# Patient Record
Sex: MALE | Race: WHITE | NOT HISPANIC OR LATINO | Employment: OTHER | URBAN - NONMETROPOLITAN AREA
[De-identification: names, ages, dates, MRNs, and addresses within clinical notes are randomized per-mention and may not be internally consistent; named-entity substitution may affect disease eponyms.]

---

## 2019-01-31 ENCOUNTER — OFFICE VISIT (OUTPATIENT)
Dept: NEUROSURGERY | Facility: CLINIC | Age: 54
End: 2019-01-31

## 2019-01-31 VITALS
HEIGHT: 67 IN | DIASTOLIC BLOOD PRESSURE: 76 MMHG | SYSTOLIC BLOOD PRESSURE: 118 MMHG | WEIGHT: 179.8 LBS | BODY MASS INDEX: 28.22 KG/M2

## 2019-01-31 DIAGNOSIS — G60.0 CHARCOT-MARIE-TOOTH DISEASE: ICD-10-CM

## 2019-01-31 DIAGNOSIS — M54.2 CERVICALGIA: ICD-10-CM

## 2019-01-31 DIAGNOSIS — Z78.9 NON-SMOKER: ICD-10-CM

## 2019-01-31 DIAGNOSIS — M54.12 CERVICAL RADICULOPATHY: ICD-10-CM

## 2019-01-31 DIAGNOSIS — M48.02 SPINAL STENOSIS IN CERVICAL REGION: Primary | ICD-10-CM

## 2019-01-31 PROCEDURE — 99203 OFFICE O/P NEW LOW 30 MIN: CPT | Performed by: NEUROLOGICAL SURGERY

## 2019-01-31 RX ORDER — ALBUTEROL SULFATE 90 UG/1
AEROSOL, METERED RESPIRATORY (INHALATION)
COMMUNITY
End: 2019-06-21

## 2019-01-31 RX ORDER — GEMFIBROZIL 600 MG/1
600 TABLET, FILM COATED ORAL DAILY
COMMUNITY
Start: 2018-12-03 | End: 2020-02-17 | Stop reason: SDUPTHER

## 2019-01-31 RX ORDER — TADALAFIL 20 MG/1
20 TABLET ORAL DAILY PRN
COMMUNITY
End: 2020-02-17 | Stop reason: SDUPTHER

## 2019-01-31 NOTE — PROGRESS NOTES
Patient: Iam Gibbs  : 1965    Primary Care Provider: Terri Frazier PA    Requesting Provider: Terri Frazier PA        History    Chief Complaint: Neck pain  Chief Complaint   Patient presents with   • Neck & arm pain     patient with pain in his neck that radiates into his shoulders and down his arms; patient brought imaging CD in for review today ([facility])       History of Present Illness: 54-year-old gentleman with a history of Charcot-Claire-Tooth disease, fell about a year ago secondary to his gait problems onto his left shoulder.  He then had several months of neck pain and bilateral shoulder pain.  He was treated by his primary care doctor with a steroid pack and was sent to an extensive course of physical therapy for about 6 months last year.  He also did chiropractic care and massage therapy.  They did use a traction device on his cervical spine.  He really struggled through most of the year with these chronic pain issues in his shoulders and neck until about a month ago.  He says for the last several weeks his pain is been remarkably better.  His shoulder pain is resolved and his neck pain is much more manageable.  He does not take any oral pain medication other than over-the-counter anti-inflammatories.  He was diagnosed with Charcot-Claire-Tooth and he was 14 years old.  He is been disabled for about 7 years from the lumbar and history.  He walks with a cane.  He also uses AFOs.    Review of Systems   Musculoskeletal: Positive for arthralgias, back pain, gait problem and neck pain.   All other systems reviewed and are negative.      Past Medical History:   Past Medical History:   Diagnosis Date   • Arthritis    • Back pain    • Charcot-Claire-Tooth disease    • COPD (chronic obstructive pulmonary disease) (CMS/[facility])    • GERD (gastroesophageal reflux disease)    • Hyperlipidemia    • Hypertension    • Irritable bowel        Past Surgical History: History reviewed. No  pertinent surgical history.    Family History: family history includes Cancer in his father and mother; Diabetes in his mother; Hyperlipidemia in his father; Hypertension in his father.    Social History:  reports that he quit smoking about 3 years ago. His smoking use included cigars. He quit smokeless tobacco use about 19 years ago. He reports that he drinks about 1.8 - 2.4 oz of alcohol per week. He reports that he does not use drugs.    Medications:    (Not in a hospital admission)    Allergies:  Patient has no known allergies.    Physical Exam:     Physical Exam   Constitutional: He is oriented to person, place, and time. He appears well-developed and well-nourished.   Cardiovascular: Normal rate, regular rhythm and normal heart sounds.   Pulmonary/Chest: Effort normal. No respiratory distress.   Abdominal: Soft. He exhibits no distension. There is no tenderness.   Neurological: He is oriented to person, place, and time. He has an abnormal Tandem Gait Test. He has a normal Finger-Nose-Finger Test.   Psychiatric: His speech is normal.       Neurologic Exam     Mental Status   Oriented to person, place, and time.   Attention: normal.   Speech: speech is normal   Level of consciousness: alert  Knowledge: good.     Cranial Nerves   Cranial nerves II through XII intact.     Motor Exam   Muscle bulk: Decreased muscle bulk in the hands and feet with wasting noted.  Overall muscle tone: normal  Right arm pronator drift: absent  Left arm pronator drift: absentOrthopedic deformities of the hands and feet    Strength in the upper extremities is grossly 5 out of 5 in all flexion extension movements    Strength in the lower extremities is 4 out of 5 in the quadriceps and hamstrings.  3 out of 5 in dorsiflexion plantarflexion of the feet.     Sensory Exam   Light touch normal.   Pinprick normal.     Gait, Coordination, and Reflexes     Gait  Gait: (Severe instability of gait with a large joint instability of the knees and  ankles)    Coordination   Finger to nose coordination: normal  Tandem walking coordination: abnormal    Tremor   Resting tremor: absent  Intention tremor: absent  Action tremor: absent    Reflexes   Reflexes 2+ except as noted.     Normal active  And passive range of motion of the shoulders    Independent Review of Radiographic Studies:   XR of cervical spine and left shoulder with mild degenerative changes    ASSESSMENT/PLAN:The nick has improved the last few weeks from his neck and shoulder injury. He has done conservative care and has back off his normal strenuous activities the last month and has noticed a big difference in his neck and shoulder pain. I do not feel the need to image his shoulders at this point. I did discuss MRI of the cervical spine as an option if his neck and shoulder pain returns. We will see him in follow up  In 6 weeks.  1. Spinal stenosis in cervical region    2. Cervical radiculopathy    3. Charcot-Claire-Tooth disease    4. Cervicalgia    5. BMI 28.0-28.9,adult    6. Non-smoker          Return in about 6 weeks (around 3/14/2019) for follow up w/ROSA.      Kike Pinedo MD

## 2019-01-31 NOTE — PATIENT INSTRUCTIONS
PATIENT TO CONTINUE TO FOLLOW UP WITH HIS PRIMARY CARE PROVIDER FOR YEARLY PHYSICAL EXAMS TO ENSURE COMPLETE HEALTH MAINTENANCE      BMI for Adults  Body mass index (BMI) is a number that is calculated from a person's weight and height. In most adults, the number is used to find how much of an adult's weight is made up of fat. BMI is not as accurate as a direct measure of body fat.  How is BMI calculated?  BMI is calculated by dividing weight in kilograms by height in meters squared. It can also be calculated by dividing weight in pounds by height in inches squared, then multiplying the resulting number by 703. Charts are available to help you find your BMI quickly and easily without doing this calculation.  How is BMI interpreted?  Health care professionals use BMI charts to identify whether an adult is underweight, at a normal weight, or overweight based on the following guidelines:  · Underweight: BMI less than 18.5.  · Normal weight: BMI between 18.5 and 24.9.  · Overweight: BMI between 25 and 29.9.  · Obese: BMI of 30 and above.    BMI is usually interpreted the same for males and females.  Weight includes both fat and muscle, so someone with a muscular build, such as an athlete, may have a BMI that is higher than 24.9. In cases like these, BMI may not accurately depict body fat. To determine if excess body fat is the cause of a BMI of 25 or higher, further assessments may need to be done by a health care provider.  Why is BMI a useful tool?  BMI is used to identify a possible weight problem that may be related to a medical problem or may increase the risk for medical problems. BMI can also be used to promote changes to reach a healthy weight.  This information is not intended to replace advice given to you by your health care provider. Make sure you discuss any questions you have with your health care provider.  Document Released: 08/29/2005 Document Revised: 04/27/2017 Document Reviewed: 05/15/2015  [employer]  Interactive Patient Education © 2018 [employer]

## 2019-03-14 ENCOUNTER — TELEPHONE (OUTPATIENT)
Dept: NEUROSURGERY | Facility: CLINIC | Age: 54
End: 2019-03-14

## 2019-03-14 NOTE — TELEPHONE ENCOUNTER
Raoul has an upcoming appointment for follow up on 03/19/19, he has called asking if we were going to order an MRI to be done prior to this appointment,  IF we do he will need something to relax him as he is very claustrophobic... I do not see an order for one.    Please advise and I will let patient know.     His number [phone]

## 2019-03-14 NOTE — TELEPHONE ENCOUNTER
Needs to be seen first to have documentation before we can order MRI. We can talk about something to help relax at that time

## 2019-03-18 NOTE — TELEPHONE ENCOUNTER
Per Dr Pinedo's note of 1/2019: symptoms were improved and there wasn't a need for MRI at that time.  If his symptoms have returned he may need to be examined - would leave it up to Ruben.    dusty albright CMA

## 2019-03-18 NOTE — TELEPHONE ENCOUNTER
Called Iam to let him know that documentation would be needed prior to ordering an MRI before his next appointment, he was VERY UNHAPPY about that, canceled his appointment and states HE WANTS THE MRI OF THE CERVICAL with medication to relax him for the MRI as he is very claustrophobic and THEN a follow up appointment, he sees no point in coming in until he has the MRI, just a waste of his time and money.  He states he must have totally misunderstood Dr. Pinedo at this last visit because he thought an MRI WAS TO BE SCHEDULED.     Please advise as to how to handle this.  The appointment has been canceled for follow up.

## 2019-03-19 NOTE — TELEPHONE ENCOUNTER
It is insurance protocol. I cannot order an MRI on person that had improved symptoms and now 2 months later is having worsening symptoms. I have to see and examine and have documentation for insurance reasons to justify MRI. I am happy to see but will not order till he is seen. If there is any problems when talking to the patient I will be happy to talk to him. thanks

## 2019-03-21 NOTE — TELEPHONE ENCOUNTER
I called Raoul back and explained to him once again that he would need to be seen to order another MRI, he then put his wife on the phone (I guess, her name was Jaky and she repeated to me again that they were told all that they needed to do was call and we would get an MRI prior to his next appointment) any way he decided to make another appointment to be seen and he was given an appointment for 04/11/19 with Ruben.

## 2019-04-11 ENCOUNTER — OFFICE VISIT (OUTPATIENT)
Dept: NEUROSURGERY | Facility: CLINIC | Age: 54
End: 2019-04-11

## 2019-04-11 ENCOUNTER — HOSPITAL ENCOUNTER (OUTPATIENT)
Dept: GENERAL RADIOLOGY | Facility: HOSPITAL | Age: 54
Discharge: HOME | End: 2019-04-11
Admitting: NURSE PRACTITIONER

## 2019-04-11 VITALS
BODY MASS INDEX: 28.28 KG/M2 | HEIGHT: 67 IN | WEIGHT: 180.2 LBS | SYSTOLIC BLOOD PRESSURE: 122 MMHG | DIASTOLIC BLOOD PRESSURE: 80 MMHG

## 2019-04-11 DIAGNOSIS — M25.511 CHRONIC RIGHT SHOULDER PAIN: ICD-10-CM

## 2019-04-11 DIAGNOSIS — M48.02 SPINAL STENOSIS IN CERVICAL REGION: Primary | ICD-10-CM

## 2019-04-11 DIAGNOSIS — M54.12 CERVICAL RADICULOPATHY: ICD-10-CM

## 2019-04-11 DIAGNOSIS — G89.29 CHRONIC RIGHT SHOULDER PAIN: ICD-10-CM

## 2019-04-11 DIAGNOSIS — Z78.9 NON-SMOKER: ICD-10-CM

## 2019-04-11 PROCEDURE — 99213 OFFICE O/P EST LOW 20 MIN: CPT | Performed by: NURSE PRACTITIONER

## 2019-04-11 PROCEDURE — 73030 X-RAY EXAM OF SHOULDER: CPT

## 2019-04-11 RX ORDER — ALPRAZOLAM 2 MG/1
2 TABLET ORAL ONCE
Qty: 1 TABLET | Refills: 0 | Status: SHIPPED | OUTPATIENT
Start: 2019-04-11 | End: 2019-04-11

## 2019-04-11 RX ORDER — FLUOXETINE HYDROCHLORIDE 20 MG/1
CAPSULE ORAL
COMMUNITY
End: 2019-06-21

## 2019-04-11 NOTE — PROGRESS NOTES
Chief complaint:   Chief Complaint   Patient presents with   • Neck Pain     Raoul is returning today and states that his neck and bilateral shoulder and arm pain.        Subjective     HPI: This is a 54-year-old male gentleman with a history of Charcot-Claire-Tooth disease.  He has been dealing with neck and shoulder pain.  He was given steroids and therapy and did well from this treatment.  He was doing well the last time that we saw him although he still has the pain he felt that was more manageable.  The patient has x-rays that do show he has some degeneration in his neck.  He says that when he was doing the therapy he did have some improvement with traction but he says since that was over he would have a return of his symptoms.  He continues to complain of pain in his neck and also pain going from his right shoulder down into his elbow bilaterally however the right is worse than the left.  Rates his pain on a scale 0-10 7.  He says it does interfere with his activities of daily living.    Review of Systems   Musculoskeletal: Positive for neck pain.   Neurological: Positive for weakness.        Past Medical History:   Diagnosis Date   • Arthritis    • Back pain    • Charcot-Claire-Tooth disease    • COPD (chronic obstructive pulmonary disease) (CMS/[facility])    • GERD (gastroesophageal reflux disease)    • Hyperlipidemia    • Hypertension    • Irritable bowel      No past surgical history on file.  Family History   Problem Relation Age of Onset   • Cancer Mother    • Diabetes Mother    • Hyperlipidemia Father    • Hypertension Father    • Cancer Father      Social History     Tobacco Use   • Smoking status: Former Smoker     Types: Cigars     Last attempt to quit: 10/2015     Years since quitting: 3.5   • Smokeless tobacco: Former User     Quit date: 2000   Substance Use Topics   • Alcohol use: Yes     Alcohol/week: 1.8 - 2.4 oz     Types: 3 - 4 Cans of beer per week   • Drug use: No       (Not in a hospital  "admission)  Allergies:  Patient has no known allergies.    Objective      Vital Signs  /80 (BP Location: Right arm, Patient Position: Sitting)   Ht 170.2 cm (67\")   Wt 81.7 kg (180 lb 3.2 oz)   BMI 28.22 kg/m²     Physical Exam   Constitutional: He is oriented to person, place, and time. He appears well-developed and well-nourished.   HENT:   Head: Normocephalic.   Eyes: EOM are normal. Pupils are equal, round, and reactive to light.   Neck: Normal range of motion.   Pulmonary/Chest: Effort normal.   Musculoskeletal: Normal range of motion.        Right shoulder: He exhibits pain.        Cervical back: He exhibits pain.   Neurological: He is alert and oriented to person, place, and time. He has normal reflexes. No cranial nerve deficit or sensory deficit. Gait abnormal. GCS eye subscore is 4. GCS verbal subscore is 5. GCS motor subscore is 6.   orthopedic deformities of the hands and feet    Strength in the upper extremities is grossly 5 out of 5 in all flexion extension movements    Strength in the lower extremities is 4 out of 5 in the quadriceps and hamstrings.  3 out of 5 in dorsiflexion plantarflexion of the feet.    Skin: Skin is warm.   Psychiatric: He has a normal mood and affect. His speech is normal and behavior is normal. Thought content normal.       Results Review: No new imaging          Assessment/Plan: At this point I am going to send the patient for x-rays of his right shoulder as well as an MRI of the cervical spine and right shoulder.  I will also send Xanax to his pharmacy to help him get through the MRI as he does have severe claustrophobia.  We will follow-up with him once this is completed and see if there is anything else that needs to be addressed.  BMI shows the patient is Overweight. BMI chart was given to the patient. Patient is a non-smoker    Iam was seen today for neck pain.    Diagnoses and all orders for this visit:    Spinal stenosis in cervical region  -     MRI " Cervical Spine Without Contrast; Future    Cervical radiculopathy  -     MRI Cervical Spine Without Contrast; Future    Chronic right shoulder pain  -     XR Shoulder 2+ View Right; Future  -     MRI shoulder right wo contrast; Future    Non-smoker    BMI 28.0-28.9,adult    Other orders  -     Cancel: MRI Cervical Spine Without Contrast; Future          I discussed the patients findings and my recommendations with patient    Ruben Sullivan, APRN  04/11/19  12:53 PM

## 2019-04-11 NOTE — PATIENT INSTRUCTIONS

## 2019-04-24 ENCOUNTER — HOSPITAL ENCOUNTER (OUTPATIENT)
Dept: MRI IMAGING | Facility: HOSPITAL | Age: 54
Discharge: HOME | End: 2019-04-24
Admitting: NURSE PRACTITIONER

## 2019-04-24 ENCOUNTER — HOSPITAL ENCOUNTER (OUTPATIENT)
Dept: MRI IMAGING | Facility: HOSPITAL | Age: 54
Discharge: HOME | End: 2019-04-24

## 2019-04-24 DIAGNOSIS — M54.12 CERVICAL RADICULOPATHY: ICD-10-CM

## 2019-04-24 DIAGNOSIS — M25.511 CHRONIC RIGHT SHOULDER PAIN: ICD-10-CM

## 2019-04-24 DIAGNOSIS — M48.02 SPINAL STENOSIS IN CERVICAL REGION: ICD-10-CM

## 2019-04-24 DIAGNOSIS — G89.29 CHRONIC RIGHT SHOULDER PAIN: ICD-10-CM

## 2019-04-24 PROCEDURE — 73221 MRI JOINT UPR EXTREM W/O DYE: CPT

## 2019-04-24 PROCEDURE — 72141 MRI NECK SPINE W/O DYE: CPT

## 2019-04-30 ENCOUNTER — TELEPHONE (OUTPATIENT)
Dept: NEUROSURGERY | Facility: CLINIC | Age: 54
End: 2019-04-30

## 2019-04-30 DIAGNOSIS — G89.29 CHRONIC RIGHT SHOULDER PAIN: Primary | ICD-10-CM

## 2019-04-30 DIAGNOSIS — M25.511 CHRONIC RIGHT SHOULDER PAIN: Primary | ICD-10-CM

## 2019-05-03 ENCOUNTER — TELEPHONE (OUTPATIENT)
Dept: NEUROSURGERY | Facility: CLINIC | Age: 54
End: 2019-05-03

## 2019-05-03 DIAGNOSIS — J44.9 CHRONIC OBSTRUCTIVE PULMONARY DISEASE, UNSPECIFIED COPD TYPE (HCC): ICD-10-CM

## 2019-05-03 NOTE — TELEPHONE ENCOUNTER
Called and left message on the patient's phone regarding the MRI of the cervical spine.  I did suggest that we we get an orthopedic surgeons opinion about his shoulder prior to doing any further intervention regarding his neck at this time as there is only mild degeneration and stenosis present.

## 2019-05-06 NOTE — TELEPHONE ENCOUNTER
Ruben did try calling patient and left him a message that he really needs to follow up on the shoulder issue before any more treatment for the neck, see chart.

## 2019-06-21 ENCOUNTER — APPOINTMENT (OUTPATIENT)
Dept: PREADMISSION TESTING | Facility: HOSPITAL | Age: 54
End: 2019-06-21

## 2019-06-21 VITALS
RESPIRATION RATE: 16 BRPM | BODY MASS INDEX: 27.72 KG/M2 | SYSTOLIC BLOOD PRESSURE: 133 MMHG | OXYGEN SATURATION: 96 % | DIASTOLIC BLOOD PRESSURE: 93 MMHG | HEIGHT: 67 IN | HEART RATE: 82 BPM | WEIGHT: 176.59 LBS

## 2019-06-21 LAB
ANION GAP SERPL CALCULATED.3IONS-SCNC: 12 MMOL/L (ref 4–13)
BUN BLD-MCNC: 20 MG/DL (ref 5–21)
BUN/CREAT SERPL: 21.7 (ref 7–25)
CALCIUM SPEC-SCNC: 9.4 MG/DL (ref 8.4–10.4)
CHLORIDE SERPL-SCNC: 98 MMOL/L (ref 98–110)
CO2 SERPL-SCNC: 30 MMOL/L (ref 24–31)
CREAT BLD-MCNC: 0.92 MG/DL (ref 0.5–1.4)
DEPRECATED RDW RBC AUTO: 39.1 FL (ref 40–54)
ERYTHROCYTE [DISTWIDTH] IN BLOOD BY AUTOMATED COUNT: 12.5 % (ref 12–15)
GFR SERPL CREATININE-BSD FRML MDRD: 86 ML/MIN/1.73
GLUCOSE BLD-MCNC: 96 MG/DL (ref 70–100)
HCT VFR BLD AUTO: 47.1 % (ref 40–52)
HGB BLD-MCNC: 16.7 G/DL (ref 14–18)
MCH RBC QN AUTO: 30.7 PG (ref 28–32)
MCHC RBC AUTO-ENTMCNC: 35.5 G/DL (ref 33–36)
MCV RBC AUTO: 86.6 FL (ref 82–95)
PLATELET # BLD AUTO: 249 10*3/MM3 (ref 130–400)
PMV BLD AUTO: 10 FL (ref 6–12)
POTASSIUM BLD-SCNC: 4 MMOL/L (ref 3.5–5.3)
RBC # BLD AUTO: 5.44 10*6/MM3 (ref 4.8–5.9)
SODIUM BLD-SCNC: 140 MMOL/L (ref 135–145)
WBC NRBC COR # BLD: 6.35 10*3/MM3 (ref 4.8–10.8)

## 2019-06-21 PROCEDURE — 36415 COLL VENOUS BLD VENIPUNCTURE: CPT

## 2019-06-21 PROCEDURE — 85027 COMPLETE CBC AUTOMATED: CPT | Performed by: ORTHOPAEDIC SURGERY

## 2019-06-21 PROCEDURE — 93010 ELECTROCARDIOGRAM REPORT: CPT | Performed by: INTERNAL MEDICINE

## 2019-06-21 PROCEDURE — 93005 ELECTROCARDIOGRAM TRACING: CPT

## 2019-06-21 PROCEDURE — 80048 BASIC METABOLIC PNL TOTAL CA: CPT | Performed by: ORTHOPAEDIC SURGERY

## 2019-06-21 RX ORDER — ALBUTEROL SULFATE 1.25 MG/3ML
1 SOLUTION RESPIRATORY (INHALATION) EVERY 6 HOURS PRN
COMMUNITY
End: 2020-01-13

## 2019-06-21 NOTE — DISCHARGE INSTRUCTIONS
DAY OF SURGERY INSTRUCTIONS        YOUR SURGEON: ***AILIN MARX     PROCEDURE: ***RIGHT SHOULDER ARTHROSCOPIC ROTATOR CUFF REPAIR WITH BICEPS TENODESIS     DATE OF SURGERY: ***6/28/2019    ARRIVAL TIME: AS DIRECTED BY OFFICE      ALL OTHER HOME MEDICATION CHECK WITH YOUR PHYSICIAN                MANAGING PAIN AFTER SURGERY    We know you are probably wondering what your pain will be like after surgery.  Following surgery it is unrealistic to expect you will not have pain.   Pain is how our bodies let us know that something is wrong or cautions us to be careful.  That said, our goal is to make your pain tolerable.    Methods we may use to treat your pain include (oral or IV medications, PCAs, epidurals, nerve blocks, etc.)   While some procedures require IV pain medications for a short time after surgery, transitioning to pain medications by mouth allows for better management of pain.   Your nurse will encourage you to take oral pain medications whenever possible.  IV medications work almost immediately, but only last a short while.  Taking medications by mouth allows for a more constant level of medication in your blood stream for a longer period of time.      Once your pain is out of control it is harder to get back under control.  It is important you are aware when your next dose of pain medication is due.  If you are admitted, your nurse may write the time of your next dose on the white board in your room to help you remember.      We are interested in your pain and encourage you to inform us about aggravating factors during your visit.   Many times a simple repositioning every few hours can make a big difference.    If your physician says it is okay, do not let your pain prevent you from getting out of bed. Be sure to call your nurse for assistance prior to getting up so you do not fall.      Before surgery, please decide your tolerable pain goal.  These faces help describe the pain ratings we use on a 0-10 scale.    Be prepared to tell us your goal and whether or not you take pain or anxiety medications at home.          BEFORE YOU COME TO THE HOSPITAL  (Pre-op instructions)  • Do not eat, drink, smoke or chew gum after midnight the night before surgery.  This also includes no mints.  • Morning of surgery take only the medicines you have been instructed with a sip of water unless otherwise instructed  by your physician.  • Do not shave, wear makeup or dark nail polish.  • Remove all jewelry including rings.  • Leave anything you consider valuable at home.  • Leave your suitcase in the car until after your surgery.  • Bring the following with you if applicable:  o Picture ID and insurance, Medicare or Medicaid cards  o Co-pay/deductible required by insurance (cash, check, credit card)  o Copy of advance directive, living will or power-of- documents if not brought to PAT  o CPAP or BIPAP mask and tubing  o Relaxation aids (MP3 player, book, magazine)  • On the day of surgery check in at registration located at the main entrance of the hospital.       Outpatient Surgery Guidelines, Adult  Outpatient procedures are those for which the person having the procedure is allowed to go home the same day as the procedure. Various procedures are done on an outpatient basis. You should follow some general guidelines if you will be having an outpatient procedure.  LET YOUR HEALTH CARE PROVIDER KNOW ABOUT:  · Any allergies you have.  · All medicines you are taking, including vitamins, herbs, eye drops, creams, and over-the-counter medicines.  · Previous problems you or members of your family have had with the use of anesthetics.  · Any blood disorders you have.  · Previous surgeries you have had.  · Medical conditions you have.  RISKS AND COMPLICATIONS  Your health care provider will discuss possible risks and complications with you before surgery. Common risks and complications include:    · Problems due to the use of  anesthetics.  · Blood loss and replacement (does not apply to minor surgical procedures).  · Temporary increase in pain due to surgery.  · Uncorrected pain or problems that the surgery was meant to correct.  · Infection.  · New damage.  BEFORE THE PROCEDURE  · Ask your health care provider about changing or stopping your regular medicines. You may need to stop taking certain medicines in the days or weeks before the procedure.  · Stop smoking at least 2 weeks before surgery. This lowers your risk for complications during and after surgery. Ask your health care provider for help with this if needed.  · Eat your usual meals and a light supper the day before surgery. Continue fluid intake. Do not drink alcohol.  · Do not eat or drink after midnight the night before your surgery.   · Arrange for someone to take you home and to stay with you for 24 hours after the procedure. Medicine given for your procedure may affect your ability to drive or to care for yourself.  · Call your health care provider's office if you develop an illness or problem that may prevent you from safely having your procedure.  AFTER THE PROCEDURE  After surgery, you will be taken to a recovery area, where your progress will be monitored. If there are no complications, you will be allowed to go home when you are awake, stable, and taking fluids well. You may have numbness around the surgical site. Healing will take some time. You will have tenderness at the surgical site and may have some swelling and bruising. You may also have some nausea.  HOME CARE INSTRUCTIONS  · Do not drive for 24 hours, or as directed by your health care provider. Do not drive while taking prescription pain medicines.  · Do not drink alcohol for 24 hours.  · Do not make important decisions or sign legal documents for 24 hours.  · You may resume a normal diet and activities as directed.  · Do not lift anything heavier than 10 pounds (4.5 kg) or play contact sports until your  health care provider says it is okay.  · Change your bandages (dressings) as directed.  · Only take over-the-counter or prescription medicines as directed by your health care provider.  · Follow up with your health care provider as directed.  SEEK MEDICAL CARE IF:  · You have increased bleeding (more than a small spot) from the surgical site.  · You have redness, swelling, or increasing pain in the wound.  · You see pus coming from the wound.  · You have a fever.  · You notice a bad smell coming from the wound or dressing.  · You feel lightheaded or faint.  · You develop a rash.  · You have trouble breathing.  · You develop allergies.  MAKE SURE YOU:  · Understand these instructions.  · Will watch your condition.  · Will get help right away if you are not doing well or get worse.     This information is not intended to replace advice given to you by your health care provider. Make sure you discuss any questions you have with your health care provider.     Document Released: 09/12/2002 Document Revised: 05/03/2016 Document Reviewed: 05/22/2014  [employer] Interactive Patient Education ©2016 [employer]       Fall Prevention in Hospitals, Adult  As a hospital patient, your condition and the treatments you receive can increase your risk for falls. Some additional risk factors for falls in a hospital include:  · Being in an unfamiliar environment.  · Being on bed rest.  · Your surgery.  · Taking certain medicines.  · Your tubing requirements, such as intravenous (IV) therapy or catheters.  It is important that you learn how to decrease fall risks while at the hospital. Below are important tips that can help prevent falls.  SAFETY TIPS FOR PREVENTING FALLS  Talk about your risk of falling.  · Ask your health care provider why you are at risk for falling. Is it your medicine, illness, tubing placement, or something else?  · Make a plan with your health care provider to keep you safe from falls.  · Ask your health care  provider or pharmacist about side effects of your medicines. Some medicines can make you dizzy or affect your coordination.  Ask for help.  · Ask for help before getting out of bed. You may need to press your call button.  · Ask for assistance in getting safely to the toilet.  · Ask for a walker or cane to be put at your bedside. Ask that most of the side rails on your bed be placed up before your health care provider leaves the room.  · Ask family or friends to sit with you.  · Ask for things that are out of your reach, such as your glasses, hearing aids, telephone, bedside table, or call button.  Follow these tips to avoid falling:  · Stay lying or seated, rather than standing, while waiting for help.  · Wear rubber-soled slippers or shoes whenever you walk in the hospital.  · Avoid quick, sudden movements.  ¨ Change positions slowly.  ¨ Sit on the side of your bed before standing.  ¨ Stand up slowly and wait before you start to walk.  · Let your health care provider know if there is a spill on the floor.  · Pay careful attention to the medical equipment, electrical cords, and tubes around you.  · When you need help, use your call button by your bed or in the bathroom. Wait for one of your health care providers to help you.  · If you feel dizzy or unsure of your footing, return to bed and wait for assistance.  · Avoid being distracted by the TV, telephone, or another person in your room.  · Do not lean or support yourself on rolling objects, such as IV poles or bedside tables.     This information is not intended to replace advice given to you by your health care provider. Make sure you discuss any questions you have with your health care provider.     Document Released: 12/15/2001 Document Revised: 01/08/2016 Document Reviewed: 08/25/2013  [employer] Interactive Patient Education ©2016 [employer]       Surgical Site Infections FAQs  What is a Surgical Site Infection (SSI)?  A surgical site infection is an  infection that occurs after surgery in the part of the body where the surgery took place. Most patients who have surgery do not develop an infection. However, infections develop in about 1 to 3 out of every 100 patients who have surgery.  Some of the common symptoms of a surgical site infection are:  · Redness and pain around the area where you had surgery  · Drainage of cloudy fluid from your surgical wound  · Fever  Can SSIs be treated?  Yes. Most surgical site infections can be treated with antibiotics. The antibiotic given to you depends on the bacteria (germs) causing the infection. Sometimes patients with SSIs also need another surgery to treat the infection.  What are some of the things that hospitals are doing to prevent SSIs?  To prevent SSIs, doctors, nurses, and other healthcare providers:  · Clean their hands and arms up to their elbows with an antiseptic agent just before the surgery.  · Clean their hands with soap and water or an alcohol-based hand rub before and after caring for each patient.  · May remove some of your hair immediately before your surgery using electric clippers if the hair is in the same area where the procedure will occur. They should not shave you with a razor.  · Wear special hair covers, masks, gowns, and gloves during surgery to keep the surgery area clean.  · Give you antibiotics before your surgery starts. In most cases, you should get antibiotics within 60 minutes before the surgery starts and the antibiotics should be stopped within 24 hours after surgery.  · Clean the skin at the site of your surgery with a special soap that kills germs.  What can I do to help prevent SSIs?  Before your surgery:  · Tell your doctor about other medical problems you may have. Health problems such as allergies, diabetes, and obesity could affect your surgery and your treatment.  · Quit smoking. Patients who smoke get more infections. Talk to your doctor about how you can quit before your  surgery.  · Do not shave near where you will have surgery. Shaving with a razor can irritate your skin and make it easier to develop an infection.  At the time of your surgery:  · Speak up if someone tries to shave you with a razor before surgery. Ask why you need to be shaved and talk with your surgeon if you have any concerns.  · Ask if you will get antibiotics before surgery.  After your surgery:  · Make sure that your healthcare providers clean their hands before examining you, either with soap and water or an alcohol-based hand rub.  · If you do not see your providers clean their hands, please ask them to do so.  · Family and friends who visit you should not touch the surgical wound or dressings.  · Family and friends should clean their hands with soap and water or an alcohol-based hand rub before and after visiting you. If you do not see them clean their hands, ask them to clean their hands.  What do I need to do when I go home from the hospital?  · Before you go home, your doctor or nurse should explain everything you need to know about taking care of your wound. Make sure you understand how to care for your wound before you leave the hospital.  · Always clean your hands before and after caring for your wound.  · Before you go home, make sure you know who to contact if you have questions or problems after you get home.  · If you have any symptoms of an infection, such as redness and pain at the surgery site, drainage, or fever, call your doctor immediately.  If you have additional questions, please ask your doctor or nurse.  Developed and co-sponsored by The Society for Healthcare Epidemiology of [place] (SHEA); Infectious Diseases Society of [place] (IDSA); American Hospital Association; Association for Professionals in Infection Control and Epidemiology (APIC); Centers for Disease Control and Prevention (CDC); and The Joint Commission.     This information is not intended to replace advice given to you by  your health care provider. Make sure you discuss any questions you have with your health care provider.     Document Released: 12/23/2014 Document Revised: 01/08/2016 Document Reviewed: 03/02/2016  [employer] Interactive Patient Education ©2016 [employer]       [facility]  CHG 4% Patient Instruction Sheet    Preparing the Skin Before Surgery  Preparing or “prepping” skin before surgery can reduce the risk of infection at the surgical site. To make the process easier,[facility] has chosen 4% Chlorhexidine Gluconate (CHG) antiseptic solution.   The steps below outline the prepping process and should be carefully followed.                                                                                                                                                      Prep the skin at the following time(s):                                                      We recommend you shower the night before surgery, and again the morning of surgery with the 4% CHG antiseptic solution using half of the bottle and a cloth each time.  Dress in clean clothes/sleepwear after showering.  See instructions below for application.          Do not apply any lotions or moisturizers.       Do not shave the area to be prepped for at least 2 days prior to surgery.    Clipping the hair may be done immediately prior to your surgery at the hospital    if needed.    Directions:  Thoroughly rinse your body with water.  Apply 4% CHG to a cloth and wash skin gently, paying special attention to the operative site.  Rinse again thoroughly.  Once you have begun using this product do not apply anything else to your skin. If itching or redness persists, rinse affected areas and discontinue use.    When using this product:  • Keep out of eyes, ears, and mouth.  • If solution should contact these areas, rinse out promptly and thoroughly with water.  • For external use only.  • Do not use in genital area, on your face or  head.      PATIENT/FAMILY/RESPONSIBLE PARTY VERBALIZES UNDERSTANDING OF ABOVE EDUCATION.  COPY OF PAIN SCALE GIVEN AND REVIEWED WITH VERBALIZED UNDERSTANDING.

## 2019-06-28 ENCOUNTER — ANESTHESIA (OUTPATIENT)
Dept: PERIOP | Facility: HOSPITAL | Age: 54
End: 2019-06-28

## 2019-06-28 ENCOUNTER — ANESTHESIA EVENT (OUTPATIENT)
Dept: PERIOP | Facility: HOSPITAL | Age: 54
End: 2019-06-28

## 2019-06-28 ENCOUNTER — HOSPITAL ENCOUNTER (OUTPATIENT)
Facility: HOSPITAL | Age: 54
Setting detail: HOSPITAL OUTPATIENT SURGERY
Discharge: HOME | End: 2019-06-28
Attending: ORTHOPAEDIC SURGERY | Admitting: ORTHOPAEDIC SURGERY

## 2019-06-28 VITALS
DIASTOLIC BLOOD PRESSURE: 68 MMHG | SYSTOLIC BLOOD PRESSURE: 103 MMHG | TEMPERATURE: 97 F | HEART RATE: 85 BPM | OXYGEN SATURATION: 97 % | RESPIRATION RATE: 16 BRPM

## 2019-06-28 PROCEDURE — C1713 ANCHOR/SCREW BN/BN,TIS/BN: HCPCS | Performed by: ORTHOPAEDIC SURGERY

## 2019-06-28 PROCEDURE — 25010000002 DEXAMETHASONE PER 1 MG: Performed by: ANESTHESIOLOGY

## 2019-06-28 PROCEDURE — 25010000002 EPINEPHRINE PER 0.1 MG: Performed by: ORTHOPAEDIC SURGERY

## 2019-06-28 PROCEDURE — 25010000002 PHENYLEPHRINE PER 1 ML: Performed by: NURSE ANESTHETIST, CERTIFIED REGISTERED

## 2019-06-28 PROCEDURE — 25010000002 ONDANSETRON PER 1 MG: Performed by: NURSE ANESTHETIST, CERTIFIED REGISTERED

## 2019-06-28 PROCEDURE — 25010000002 PROPOFOL 10 MG/ML EMULSION: Performed by: NURSE ANESTHETIST, CERTIFIED REGISTERED

## 2019-06-28 PROCEDURE — 25010000002 ROPIVACAINE PER 1 MG: Performed by: ANESTHESIOLOGY

## 2019-06-28 PROCEDURE — 25010000002 FENTANYL CITRATE (PF) 100 MCG/2ML SOLUTION: Performed by: ANESTHESIOLOGY

## 2019-06-28 PROCEDURE — 25010000002 MIDAZOLAM PER 1 MG: Performed by: ANESTHESIOLOGY

## 2019-06-28 DEVICE — SUT/ANCH JUGGERKNOT SFT 2.9MM: Type: IMPLANTABLE DEVICE | Status: FUNCTIONAL

## 2019-06-28 RX ORDER — SODIUM CHLORIDE 0.9 % (FLUSH) 0.9 %
1-10 SYRINGE (ML) INJECTION AS NEEDED
Status: DISCONTINUED | OUTPATIENT
Start: 2019-06-28 | End: 2019-06-28 | Stop reason: HOSPADM

## 2019-06-28 RX ORDER — SODIUM CHLORIDE, SODIUM LACTATE, POTASSIUM CHLORIDE, CALCIUM CHLORIDE 600; 310; 30; 20 MG/100ML; MG/100ML; MG/100ML; MG/100ML
100 INJECTION, SOLUTION INTRAVENOUS CONTINUOUS PRN
Status: DISCONTINUED | OUTPATIENT
Start: 2019-06-28 | End: 2019-06-28 | Stop reason: HOSPADM

## 2019-06-28 RX ORDER — OXYCODONE AND ACETAMINOPHEN 10; 325 MG/1; MG/1
1 TABLET ORAL EVERY 6 HOURS PRN
Qty: 40 TABLET | Refills: 0 | Status: SHIPPED | OUTPATIENT
Start: 2019-06-28 | End: 2020-01-13

## 2019-06-28 RX ORDER — SODIUM CHLORIDE 0.9 % (FLUSH) 0.9 %
3 SYRINGE (ML) INJECTION EVERY 12 HOURS SCHEDULED
Status: DISCONTINUED | OUTPATIENT
Start: 2019-06-28 | End: 2019-06-28 | Stop reason: HOSPADM

## 2019-06-28 RX ORDER — ONDANSETRON 4 MG/1
4 TABLET, FILM COATED ORAL EVERY 8 HOURS PRN
Qty: 20 TABLET | Refills: 0 | Status: SHIPPED | OUTPATIENT
Start: 2019-06-28 | End: 2020-01-13

## 2019-06-28 RX ORDER — LABETALOL HYDROCHLORIDE 5 MG/ML
5 INJECTION, SOLUTION INTRAVENOUS
Status: DISCONTINUED | OUTPATIENT
Start: 2019-06-28 | End: 2019-06-28 | Stop reason: HOSPADM

## 2019-06-28 RX ORDER — NALOXONE HCL 0.4 MG/ML
0.4 VIAL (ML) INJECTION AS NEEDED
Status: DISCONTINUED | OUTPATIENT
Start: 2019-06-28 | End: 2019-06-28 | Stop reason: HOSPADM

## 2019-06-28 RX ORDER — VASOPRESSIN 20 U/ML
INJECTION PARENTERAL AS NEEDED
Status: DISCONTINUED | OUTPATIENT
Start: 2019-06-28 | End: 2019-06-28 | Stop reason: SURG

## 2019-06-28 RX ORDER — IPRATROPIUM BROMIDE AND ALBUTEROL SULFATE 2.5; .5 MG/3ML; MG/3ML
3 SOLUTION RESPIRATORY (INHALATION) ONCE AS NEEDED
Status: DISCONTINUED | OUTPATIENT
Start: 2019-06-28 | End: 2019-06-28 | Stop reason: HOSPADM

## 2019-06-28 RX ORDER — IBUPROFEN 600 MG/1
600 TABLET ORAL ONCE AS NEEDED
Status: DISCONTINUED | OUTPATIENT
Start: 2019-06-28 | End: 2019-06-28 | Stop reason: HOSPADM

## 2019-06-28 RX ORDER — MIDAZOLAM HYDROCHLORIDE 1 MG/ML
2 INJECTION INTRAMUSCULAR; INTRAVENOUS
Status: DISCONTINUED | OUTPATIENT
Start: 2019-06-28 | End: 2019-06-28 | Stop reason: HOSPADM

## 2019-06-28 RX ORDER — MIDAZOLAM HYDROCHLORIDE 1 MG/ML
1 INJECTION INTRAMUSCULAR; INTRAVENOUS
Status: DISCONTINUED | OUTPATIENT
Start: 2019-06-28 | End: 2019-06-28 | Stop reason: HOSPADM

## 2019-06-28 RX ORDER — ONDANSETRON 4 MG/1
4 TABLET, FILM COATED ORAL ONCE AS NEEDED
Status: DISCONTINUED | OUTPATIENT
Start: 2019-06-28 | End: 2019-06-28 | Stop reason: HOSPADM

## 2019-06-28 RX ORDER — OXYCODONE AND ACETAMINOPHEN 7.5; 325 MG/1; MG/1
1 TABLET ORAL ONCE AS NEEDED
Status: DISCONTINUED | OUTPATIENT
Start: 2019-06-28 | End: 2019-06-28 | Stop reason: HOSPADM

## 2019-06-28 RX ORDER — MAGNESIUM HYDROXIDE 1200 MG/15ML
LIQUID ORAL AS NEEDED
Status: DISCONTINUED | OUTPATIENT
Start: 2019-06-28 | End: 2019-06-28 | Stop reason: HOSPADM

## 2019-06-28 RX ORDER — OXYCODONE HYDROCHLORIDE AND ACETAMINOPHEN 5; 325 MG/1; MG/1
1 TABLET ORAL ONCE AS NEEDED
Status: DISCONTINUED | OUTPATIENT
Start: 2019-06-28 | End: 2019-06-28 | Stop reason: HOSPADM

## 2019-06-28 RX ORDER — SODIUM CHLORIDE 0.9 % (FLUSH) 0.9 %
3-10 SYRINGE (ML) INJECTION AS NEEDED
Status: DISCONTINUED | OUTPATIENT
Start: 2019-06-28 | End: 2019-06-28 | Stop reason: HOSPADM

## 2019-06-28 RX ORDER — OXYCODONE AND ACETAMINOPHEN 7.5; 325 MG/1; MG/1
1 TABLET ORAL EVERY 4 HOURS PRN
Status: DISCONTINUED | OUTPATIENT
Start: 2019-06-28 | End: 2019-06-28 | Stop reason: HOSPADM

## 2019-06-28 RX ORDER — SODIUM CHLORIDE, SODIUM LACTATE, POTASSIUM CHLORIDE, CALCIUM CHLORIDE 600; 310; 30; 20 MG/100ML; MG/100ML; MG/100ML; MG/100ML
100 INJECTION, SOLUTION INTRAVENOUS CONTINUOUS
Status: DISCONTINUED | OUTPATIENT
Start: 2019-06-28 | End: 2019-06-28 | Stop reason: HOSPADM

## 2019-06-28 RX ORDER — ROPIVACAINE HYDROCHLORIDE 5 MG/ML
INJECTION, SOLUTION EPIDURAL; INFILTRATION; PERINEURAL
Status: COMPLETED | OUTPATIENT
Start: 2019-06-28 | End: 2019-06-28

## 2019-06-28 RX ORDER — DEXAMETHASONE SODIUM PHOSPHATE 4 MG/ML
4 INJECTION, SOLUTION INTRA-ARTICULAR; INTRALESIONAL; INTRAMUSCULAR; INTRAVENOUS; SOFT TISSUE ONCE AS NEEDED
Status: COMPLETED | OUTPATIENT
Start: 2019-06-28 | End: 2019-06-28

## 2019-06-28 RX ORDER — FENTANYL CITRATE 50 UG/ML
25 INJECTION, SOLUTION INTRAMUSCULAR; INTRAVENOUS
Status: DISCONTINUED | OUTPATIENT
Start: 2019-06-28 | End: 2019-06-28 | Stop reason: HOSPADM

## 2019-06-28 RX ORDER — BUPIVACAINE HCL/0.9 % NACL/PF 0.1 %
2 PLASTIC BAG, INJECTION (ML) EPIDURAL ONCE
Status: COMPLETED | OUTPATIENT
Start: 2019-06-28 | End: 2019-06-28

## 2019-06-28 RX ORDER — EPINEPHRINE 1 MG/ML
INJECTION, SOLUTION, CONCENTRATE INTRAVENOUS AS NEEDED
Status: DISCONTINUED | OUTPATIENT
Start: 2019-06-28 | End: 2019-06-28 | Stop reason: HOSPADM

## 2019-06-28 RX ORDER — ACETAMINOPHEN 500 MG
1000 TABLET ORAL ONCE
Status: COMPLETED | OUTPATIENT
Start: 2019-06-28 | End: 2019-06-28

## 2019-06-28 RX ORDER — ONDANSETRON 2 MG/ML
4 INJECTION INTRAMUSCULAR; INTRAVENOUS ONCE AS NEEDED
Status: DISCONTINUED | OUTPATIENT
Start: 2019-06-28 | End: 2019-06-28 | Stop reason: HOSPADM

## 2019-06-28 RX ORDER — ONDANSETRON 2 MG/ML
INJECTION INTRAMUSCULAR; INTRAVENOUS AS NEEDED
Status: DISCONTINUED | OUTPATIENT
Start: 2019-06-28 | End: 2019-06-28 | Stop reason: SURG

## 2019-06-28 RX ORDER — FENTANYL CITRATE 50 UG/ML
25 INJECTION, SOLUTION INTRAMUSCULAR; INTRAVENOUS AS NEEDED
Status: DISCONTINUED | OUTPATIENT
Start: 2019-06-28 | End: 2019-06-28 | Stop reason: HOSPADM

## 2019-06-28 RX ORDER — LIDOCAINE HYDROCHLORIDE 20 MG/ML
INJECTION, SOLUTION INFILTRATION; PERINEURAL AS NEEDED
Status: DISCONTINUED | OUTPATIENT
Start: 2019-06-28 | End: 2019-06-28 | Stop reason: SURG

## 2019-06-28 RX ORDER — PROPOFOL 10 MG/ML
VIAL (ML) INTRAVENOUS AS NEEDED
Status: DISCONTINUED | OUTPATIENT
Start: 2019-06-28 | End: 2019-06-28 | Stop reason: SURG

## 2019-06-28 RX ORDER — LIDOCAINE HYDROCHLORIDE 40 MG/ML
SOLUTION TOPICAL AS NEEDED
Status: DISCONTINUED | OUTPATIENT
Start: 2019-06-28 | End: 2019-06-28 | Stop reason: SURG

## 2019-06-28 RX ORDER — ROCURONIUM BROMIDE 10 MG/ML
INJECTION, SOLUTION INTRAVENOUS AS NEEDED
Status: DISCONTINUED | OUTPATIENT
Start: 2019-06-28 | End: 2019-06-28 | Stop reason: SURG

## 2019-06-28 RX ADMIN — FENTANYL CITRATE 100 MCG: 50 INJECTION INTRAMUSCULAR; INTRAVENOUS at 09:14

## 2019-06-28 RX ADMIN — ROPIVACAINE HYDROCHLORIDE 15 ML: 5 INJECTION, SOLUTION EPIDURAL; INFILTRATION; PERINEURAL at 09:26

## 2019-06-28 RX ADMIN — PHENYLEPHRINE HYDROCHLORIDE 120 MCG: 10 INJECTION INTRAVENOUS at 10:16

## 2019-06-28 RX ADMIN — Medication 2 G: at 09:38

## 2019-06-28 RX ADMIN — VASOPRESSIN 2 UNITS: 20 INJECTION INTRAVENOUS at 10:09

## 2019-06-28 RX ADMIN — MIDAZOLAM HYDROCHLORIDE 2 MG: 1 INJECTION, SOLUTION INTRAMUSCULAR; INTRAVENOUS at 09:15

## 2019-06-28 RX ADMIN — ONDANSETRON HYDROCHLORIDE 4 MG: 2 SOLUTION INTRAMUSCULAR; INTRAVENOUS at 10:59

## 2019-06-28 RX ADMIN — SODIUM CHLORIDE, POTASSIUM CHLORIDE, SODIUM LACTATE AND CALCIUM CHLORIDE: 600; 310; 30; 20 INJECTION, SOLUTION INTRAVENOUS at 11:00

## 2019-06-28 RX ADMIN — SODIUM CHLORIDE, POTASSIUM CHLORIDE, SODIUM LACTATE AND CALCIUM CHLORIDE 100 ML/HR: 600; 310; 30; 20 INJECTION, SOLUTION INTRAVENOUS at 07:38

## 2019-06-28 RX ADMIN — PHENYLEPHRINE HYDROCHLORIDE 160 MCG: 10 INJECTION INTRAVENOUS at 10:33

## 2019-06-28 RX ADMIN — ROCURONIUM BROMIDE 30 MG: 10 INJECTION INTRAVENOUS at 09:35

## 2019-06-28 RX ADMIN — SUGAMMADEX 200 MG: 100 INJECTION, SOLUTION INTRAVENOUS at 11:08

## 2019-06-28 RX ADMIN — LIDOCAINE HYDROCHLORIDE 100 MG: 20 INJECTION, SOLUTION INFILTRATION; PERINEURAL at 09:35

## 2019-06-28 RX ADMIN — VASOPRESSIN 0.5 UNITS: 20 INJECTION INTRAVENOUS at 09:38

## 2019-06-28 RX ADMIN — VASOPRESSIN 2 UNITS: 20 INJECTION INTRAVENOUS at 10:04

## 2019-06-28 RX ADMIN — LIDOCAINE HYDROCHLORIDE 1 EACH: 40 SOLUTION TOPICAL at 09:35

## 2019-06-28 RX ADMIN — DEXAMETHASONE SODIUM PHOSPHATE 4 MG: 4 INJECTION, SOLUTION INTRAMUSCULAR; INTRAVENOUS at 09:14

## 2019-06-28 RX ADMIN — VASOPRESSIN 1 UNITS: 20 INJECTION INTRAVENOUS at 09:55

## 2019-06-28 RX ADMIN — PROPOFOL 150 MG: 10 INJECTION, EMULSION INTRAVENOUS at 09:35

## 2019-06-28 RX ADMIN — VASOPRESSIN 0.5 UNITS: 20 INJECTION INTRAVENOUS at 09:40

## 2019-06-28 RX ADMIN — ACETAMINOPHEN 1000 MG: 500 TABLET, FILM COATED ORAL at 09:13

## 2019-06-28 RX ADMIN — PHENYLEPHRINE HYDROCHLORIDE 120 MCG: 10 INJECTION INTRAVENOUS at 10:13

## 2019-06-28 NOTE — ANESTHESIA PROCEDURE NOTES
Peripheral Block    Pre-sedation assessment completed: 6/28/2019 9:22 AM    Patient reassessed immediately prior to procedure    Patient location during procedure: holding area  Start time: 6/28/2019 9:23 AM  Stop time: 6/28/2019 9:26 AM  Reason for block: procedure for pain, at surgeon's request, post-op pain management and Request by Dr. Agudelo  Performed by  Anesthesiologist: Agnieszka Leal MD  Preanesthetic Checklist  Completed: patient identified, site marked, surgical consent, pre-op evaluation, timeout performed, IV checked, risks and benefits discussed and monitors and equipment checked  Prep:  Pt Position: supine  Sterile barriers:gloves  Prep: ChloraPrep  Patient monitoring: blood pressure monitoring, continuous pulse oximetry and EKG  Procedure  Sedation:yes    Guidance:ultrasound guided and Brachial plexus identified and local anesthetic seen surrounding nerves  ULTRASOUND INTERPRETATION. Using ultrasound guidance a 22 G gauge needle was placed in close proximity to the nerve, at which point, under ultrasound guidance anesthetic was injected in the area of the nerve and spread of the anesthesia was seen on ultrasound in close proximity thereto.  There were no abnormalities seen on ultrasound; a digital image was taken; and the patient tolerated the procedure with no complications. Images:still images obtained, printed/placed on chart (picture printed and placed in patients chart)    Laterality:right  Block Type:interscalene  Injection Technique:single-shot  Needle Type:echogenic  Needle Gauge:22 G  Resistance on Injection: none    Medications Used: ropivacaine (NAROPIN) injection 0.5 %, 15 mL  Med admintered at 6/28/2019 9:26 AM      Post Assessment  Injection Assessment: negative aspiration for heme, no paresthesia on injection and incremental injection  Patient Tolerance:comfortable throughout block  Complications:no

## 2019-06-28 NOTE — ANESTHESIA PROCEDURE NOTES
Airway  Urgency: elective    Airway not difficult    General Information and Staff    Patient location during procedure: OR  CRNA: Luiz Jeong CRNA    Indications and Patient Condition  Indications for airway management: airway protection    Preoxygenated: yes  MILS maintained throughout  Mask difficulty assessment: 1 - vent by mask    Final Airway Details  Final airway type: endotracheal airway      Successful airway: ETT  Cuffed: yes   Successful intubation technique: direct laryngoscopy  Endotracheal tube insertion site: oral  Blade: Moreira  Blade size: 2  ETT size (mm): 4.0  Cormack-Lehane Classification: grade I - full view of glottis  Placement verified by: chest auscultation and capnometry   Cuff volume (mL): 5  Measured from: lips  ETT to lips (cm): 22  Number of attempts at approach: 1

## 2019-06-28 NOTE — ANESTHESIA PREPROCEDURE EVALUATION
Anesthesia Evaluation     Patient summary reviewed   NPO Solid Status: > 8 hours             Airway   Mallampati: I  TM distance: >3 FB  Neck ROM: full  No difficulty expected  Dental - normal exam     Pulmonary    (+) a smoker Former, COPD, sleep apnea,   Cardiovascular   Exercise tolerance: poor (<4 METS) (Walks with cane. Denies chest pain/shortness of breath)    ECG reviewed    (+) hypertension, hyperlipidemia,       Neuro/Psych  (+) numbness, psychiatric history Anxiety,       ROS Comment: Charcot-Claire-Tooth  GI/Hepatic/Renal/Endo    (+)  GERD,      Musculoskeletal     (+) neck pain,   Abdominal    Substance History      OB/GYN          Other   (+) arthritis            Resser Flexible laryngoscopy 11/2016:  Findings: There is bilateral paresis of the vocal cords. The airway appears to be adequate but the glottic opening is much less than expected. There is decreased lateral excursion of the vocal cords. There is no mass or lesion noticed. The airway appears to be 4-5 mm.              Anesthesia Plan    ASA 3     general with block   (Patient with charcot-claire-tooth. Prior 02 use, but now on room air without respiratory compromise. Bilateral vocal cord paresis. Discussed R/B/A of nerve block with patient. Decision made to proceed. Discussed case with surgeon and possible requirement of admission post operatively for observation.     Avoid suucinylcholine. Plan for rocuronium followed by sugammadex.     )  intravenous induction   Anesthetic plan, all risks, benefits, and alternatives have been provided, discussed and informed consent has been obtained with: patient.

## 2019-06-28 NOTE — ANESTHESIA POSTPROCEDURE EVALUATION
Patient: Iam Gibbs    Procedure Summary     Date:  06/28/19 Room / Location:   [facility] OR  /  [facility]    Anesthesia Start:  0933 Anesthesia Stop:  1120    Procedures:       RIGHT SHOULDER ARTHROSCOPIC ROTATOR CUFF REPAIR, SUBACROMIAL DECOMPRESSION (Right Shoulder)      BICEPS TENODESIS - RIGHT (Right Arm Upper) Diagnosis:  (RIGHT SHOULDER PAIN)    Surgeon:  Yeison Agudelo MD Provider:  Camryn Cartagena CRNA    Anesthesia Type:  general with block ASA Status:  3          Anesthesia Type: general with block  Last vitals  BP   106/71 (06/28/19 1222)   Temp   97 °F (36.1 °C) (06/28/19 1205)   Pulse   93 (06/28/19 1222)   Resp   16 (06/28/19 1222)     SpO2   95 % (06/28/19 1222)     Post Anesthesia Care and Evaluation    Patient location during evaluation: PACU  Patient participation: complete - patient participated  Level of consciousness: awake and alert  Pain management: adequate  Airway patency: patent  Anesthetic complications: No anesthetic complications  PONV Status: none  Cardiovascular status: acceptable and hemodynamically stable  Respiratory status: acceptable  Hydration status: acceptable    Comments: Blood pressure 106/71, pulse 93, temperature 97 °F (36.1 °C), temperature source Temporal, resp. rate 16, SpO2 95 %.    Patient discharged from PACU based upon Minnie score. Please see RN notes for further details

## 2020-01-13 ENCOUNTER — OFFICE VISIT (OUTPATIENT)
Dept: FAMILY MEDICINE CLINIC | Facility: CLINIC | Age: 55
End: 2020-01-13

## 2020-01-13 ENCOUNTER — TELEPHONE (OUTPATIENT)
Dept: FAMILY MEDICINE CLINIC | Facility: CLINIC | Age: 55
End: 2020-01-13

## 2020-01-13 VITALS
TEMPERATURE: 98.8 F | WEIGHT: 178 LBS | SYSTOLIC BLOOD PRESSURE: 118 MMHG | HEART RATE: 80 BPM | HEIGHT: 67 IN | OXYGEN SATURATION: 98 % | BODY MASS INDEX: 27.94 KG/M2 | RESPIRATION RATE: 16 BRPM | DIASTOLIC BLOOD PRESSURE: 88 MMHG

## 2020-01-13 DIAGNOSIS — J44.9 CHRONIC OBSTRUCTIVE PULMONARY DISEASE, UNSPECIFIED COPD TYPE (HCC): ICD-10-CM

## 2020-01-13 DIAGNOSIS — M10.9 GOUT OF RIGHT FOOT, UNSPECIFIED CAUSE, UNSPECIFIED CHRONICITY: ICD-10-CM

## 2020-01-13 DIAGNOSIS — Z87.09 HISTORY OF CHRONIC RESPIRATORY FAILURE: ICD-10-CM

## 2020-01-13 DIAGNOSIS — K21.9 GASTROESOPHAGEAL REFLUX DISEASE, ESOPHAGITIS PRESENCE NOT SPECIFIED: ICD-10-CM

## 2020-01-13 DIAGNOSIS — J44.1 COPD EXACERBATION (HCC): ICD-10-CM

## 2020-01-13 DIAGNOSIS — J38.02 BILATERAL VOCAL CORD PARESIS: ICD-10-CM

## 2020-01-13 DIAGNOSIS — R05.9 COUGH: ICD-10-CM

## 2020-01-13 DIAGNOSIS — R26.9 GAIT DIFFICULTY: ICD-10-CM

## 2020-01-13 PROBLEM — Z01.89 LABORATORY TEST: Status: ACTIVE | Noted: 2020-01-13

## 2020-01-13 PROBLEM — Z00.00 WELLNESS EXAMINATION: Status: ACTIVE | Noted: 2020-01-13

## 2020-01-13 PROBLEM — G89.29 CHRONIC NECK PAIN: Status: ACTIVE | Noted: 2019-01-31

## 2020-01-13 PROCEDURE — 99204 OFFICE O/P NEW MOD 45 MIN: CPT | Performed by: FAMILY MEDICINE

## 2020-01-13 RX ORDER — ALBUTEROL SULFATE 90 UG/1
2 AEROSOL, METERED RESPIRATORY (INHALATION)
COMMUNITY
End: 2021-03-10 | Stop reason: SDUPTHER

## 2020-01-13 RX ORDER — IBUPROFEN 600 MG/1
600 TABLET ORAL EVERY 6 HOURS PRN
COMMUNITY

## 2020-01-13 RX ORDER — AMOXICILLIN AND CLAVULANATE POTASSIUM 875; 125 MG/1; MG/1
1 TABLET, FILM COATED ORAL 2 TIMES DAILY
Qty: 20 TABLET | Refills: 0 | Status: ON HOLD | OUTPATIENT
Start: 2020-01-13 | End: 2020-02-17

## 2020-01-13 RX ORDER — IMIQUIMOD 12.5 MG/.25G
CREAM TOPICAL
COMMUNITY
End: 2020-01-13

## 2020-01-13 RX ORDER — ACETAMINOPHEN 500 MG
500 TABLET ORAL EVERY 6 HOURS PRN
COMMUNITY

## 2020-01-13 RX ORDER — COLCHICINE 0.6 MG/1
TABLET, FILM COATED ORAL
COMMUNITY
Start: 2020-01-06 | End: 2021-05-23

## 2020-01-13 RX ORDER — ALBUTEROL SULFATE 2.5 MG/3ML
3 SOLUTION RESPIRATORY (INHALATION) EVERY 8 HOURS SCHEDULED
COMMUNITY
End: 2022-02-03 | Stop reason: SDUPTHER

## 2020-01-13 RX ORDER — LOSARTAN POTASSIUM 25 MG/1
1 TABLET ORAL DAILY
COMMUNITY
Start: 2020-01-03 | End: 2020-04-21 | Stop reason: SDUPTHER

## 2020-01-13 RX ORDER — METHYLPREDNISOLONE 4 MG/1
TABLET ORAL
Qty: 1 EACH | Refills: 0 | Status: SHIPPED | OUTPATIENT
Start: 2020-01-13 | End: 2020-01-21

## 2020-01-13 RX ORDER — BUDESONIDE AND FORMOTEROL FUMARATE DIHYDRATE 160; 4.5 UG/1; UG/1
2 AEROSOL RESPIRATORY (INHALATION)
Qty: 3 INHALER | Refills: 2 | Status: SHIPPED | OUTPATIENT
Start: 2020-01-13 | End: 2022-02-24 | Stop reason: DRUGHIGH

## 2020-01-13 NOTE — PROGRESS NOTES
Subjective   Iam Gibbs is a 54 y.o. male presenting with chief complaint of:   Chief Complaint   Patient presents with   • Establish Care     new patient         History of Present Illness :  Alone.  Here to establish care.  Has used [place] but [facility].  Past specialist: Chantel/eileen, JAMES/pulmonary, [facility]. Referred by friend/Homero Howard.        Early  cough/sinus  Since urgent cares, ERs and will not resolve cough.      Imagin19  FINDINGS:   The lungs are clear. The cardiomediastinal silhouette and pulmonary  vascularity are within normal limits. The osseous structures and  surrounding soft tissues demonstrate no acute abnormality.  IMPRESSION:  1. No radiographic evidence of acute cardiopulmonary process.    19  FINDINGS:   The lungs are clear. The cardiomediastinal contours are normal. There  is no pleural effusion or pneumothorax. There is no acute bony  pathology. There is mild degenerative change of thoracic spine.  IMPRESSION:  Impression:  1. No acute radiographic cardiopulmonary process.    No recent CT chest    Has multiple chronic problems to consider that might have a bearing on today's issues; somewhat an interval appointment.       Chronic/acute problems reviewed today:   1. Cough: on/off years/chronic.  Worse early  and Rx will help; but not resolve.  Has GI reflux, paralysed vocal cords, smoking history (records say copd).  Denies hemoptysis, sinus c/o, dental pain, recent fever.    2. Gastroesophageal reflux disease, esophagitis presence not specified: Chronic/stable.  Controlled heartburn, reflux or if history of confusing chest pains; no dysphagia, melena.  Rx needed/helps.     3. Chronic obstructive pulmonary disease, unspecified COPD type (CMS/HCC): Chronic/variable above-mild occ cough, sob, wheeze.  Rx temporarily helps.   Still smoking.       4. Bilateral vocal cord paresis: chronic/cause ?    5. Gout of right  foot, unspecified cause, unspecified chronicity: chronic/several years.  Never treated allopurinol or for a length of time.  Recent alcohol, foods; worse.  Friend/MD gave colcry helped.  ON/off NSAID.  Today R 1st MTP.     6. History of chronic respiratory failure: chronic/better; once had to use supplemental oxygen.     7. Gait difficulty: chronic/stable since age 21 dx Claire/charot; uses cane.  No recent falls.    8. COPD exacerbation (CMS/[facility]): acute/above (likely part of issue)     Has an/another acute issue today: suspect R gout 1.5.2020 while on vacation (gout age 40s; never been on prevention Rx)    The following portions of the patient's history were reviewed and updated as appropriate: allergies, current medications, past family history, past medical history, past social history, past surgical history and problem list.  Records acquired and reviewed; TCC migrated.  all set up      Current Outpatient Medications:   •  acetaminophen (TYLENOL) 500 MG tablet, Take 500 mg by mouth Every 6 (Six) Hours As Needed for Mild Pain ., Disp: , Rfl:   •  busPIRone (BUSPAR) 15 MG tablet, Take 7.5 mg by mouth 2 (Two) Times a Day. Usually half in am, Disp: , Rfl:   •  gemfibrozil (LOPID) 600 MG tablet, Take 600 mg by mouth Daily., Disp: , Rfl:   •  hydrochlorothiazide (HYDRODIURIL) 25 MG tablet, Take 25 mg by mouth Daily., Disp: , Rfl:   •  ibuprofen (ADVIL,MOTRIN) 600 MG tablet, Take 600 mg by mouth Every 6 (Six) Hours As Needed for Mild Pain ., Disp: , Rfl-using prn:   •  losartan (COZAAR) 25 MG tablet, Take 1 tablet by mouth Daily., Disp: , Rfl:   •  raNITIdine (ZANTAC) 150 MG tablet, Take 150 mg by mouth 2 (Two) Times a Day As Needed for Heartburn. Usually take daily, Disp: , Rfl:   •  tadalafil (CIALIS) 20 MG tablet, Take 20 mg by mouth Daily As Needed for Erectile Dysfunction., Disp: , Rfl:   •  albuterol (PROVENTIL) (2.5 MG/3ML) 0.083% nebulizer solution, 3 mL Every 8 (Eight) Hours., Disp: , Rfl:   •  albuterol  sulfate HFA (VENTOLIN HFA) 108 (90 Base) MCG/ACT inhaler, Inhale 2 puffs Every 4 (Four) Hours., Disp: , Rfl:   •  COLCRYS 0.6 MG tablet, , Disp: , Rfl: not on now (had consulted MD in [place])    No problems with medications.  Refills if needed done    Allergies   Allergen Reactions   • Adhesive Tape Rash     VERY SENSITIVE TO ADHESIVE        Review of Systems    GENERAL:  Inactive/slower with limits, speed, stamina for age and UE/LE weakness. Sleep is ok; apnea denied. No fevers.  SKIN: No rash/skin lesion of concern.   ENDO:  No syncope, near or diaphoretic sweaty spells.  BS never an issue.  HEENT: No recent head injury; or headache.  No vision change.  No significant hearing loss.  Ears without pain/drainage.  No sore throat.  No significant nasal/sinus congestion/drainage. No epistaxis.  CHEST: No chest wall tenderness or mass.  Recent increased cough, with occ wheeze.  No SOB; no hemoptysis.  CV: No exertional chest pain, palpitations, ankle edema.  GI: No dysphagia or heartburn.  No abdominal pain, diarrhea, constipation.  No rectal bleeding, or melena.  No colonoscopy.   :  Voids without dysuria, or incontinence to completion.  ORTHO: No painful/swollen joints but various on /off sore.  Same daily sore neck or back.  No acute neck or back pain without recent injury.  NEURO: No focal/significant weakness of extremities.  dizziness.   Occ UE/LE numbness/paresthesias.   PSYCH: No memory loss.  Mood good; not that anxious, depressed but/and not suicidal.  Tries to tolerate stress .   Screening:  Mammogram: NA  Bone density: NA  Low dose CT chest: Tobacco-smoker/age 17/1ppd/dc 2015 (35):   GI: offered and cologuard information  Prostate: none  Usual lab order  12m CBC, CMP, LIPID, TSH, fT4, Vit D, B12, folate, iron, PSAs, sed rate, CR-protein    Lab Results:  Results for orders placed or performed in visit on 06/21/19   Basic Metabolic Panel   Result Value Ref Range    Glucose 96 70 - 100 mg/dL    BUN 20 5 - 21  "mg/dL    Creatinine 0.92 0.50 - 1.40 mg/dL    Sodium 140 135 - 145 mmol/L    Potassium 4.0 3.5 - 5.3 mmol/L    Chloride 98 98 - 110 mmol/L    CO2 30.0 24.0 - 31.0 mmol/L    Calcium 9.4 8.4 - 10.4 mg/dL    eGFR Non African Amer 86 >60 mL/min/1.73    BUN/Creatinine Ratio 21.7 7.0 - 25.0    Anion Gap 12.0 4.0 - 13.0 mmol/L   CBC (No Diff)   Result Value Ref Range    WBC 6.35 4.80 - 10.80 10*3/mm3    RBC 5.44 4.80 - 5.90 10*6/mm3    Hemoglobin 16.7 14.0 - 18.0 g/dL    Hematocrit 47.1 40.0 - 52.0 %    MCV 86.6 82.0 - 95.0 fL    MCH 30.7 28.0 - 32.0 pg    MCHC 35.5 33.0 - 36.0 g/dL    RDW 12.5 12.0 - 15.0 %    RDW-SD 39.1 (L) 40.0 - 54.0 fl    MPV 10.0 6.0 - 12.0 fL    Platelets 249 130 - 400 10*3/mm3       A1C:No results for input(s): HGBA1C in the last 27705 hours.  PSA:No results for input(s): PSA in the last 58722 hours.  CBC:  Lab Results - Last 18 Months   Lab Units 06/21/19  0852   WBC 10*3/mm3 6.35   HEMOGLOBIN g/dL 16.7   HEMATOCRIT % 47.1   PLATELETS 10*3/mm3 249      BMP/CMP:  Lab Results - Last 18 Months   Lab Units 06/21/19  0852   SODIUM mmol/L 140   POTASSIUM mmol/L 4.0   CHLORIDE mmol/L 98   CO2 mmol/L 30.0   BUN mg/dL 20   CREATININE mg/dL 0.92   EGFR IF NONAFRICN AM mL/min/1.73 86   CALCIUM mg/dL 9.4     HEPATIC:No results for input(s): ALT, AST, ALKPHOS, TOTAL in the last 89542 hours.  THYROID:No results for input(s): TSH, T3FREE, FTI in the last 56319 hours.    Invalid input(s): T4FREE, T3, T4, TEUP,  TOTALT4    Objective   /88 (BP Location: Left arm, Patient Position: Sitting, Cuff Size: Large Adult)   Pulse 80   Temp 98.8 °F (37.1 °C) (Oral)   Resp 16   Ht 170.2 cm (67\")   Wt 80.7 kg (178 lb)   SpO2 98%   BMI 27.88 kg/m²   Body mass index is 27.88 kg/m².    Physical Exam    GENERAL:  Well nourished/developed in no acute distress.   SKIN: Turgor excellent, without wound, rash, lesion  HEENT: Normal cephalic without trauma.  Pupils equal round reactive to light. Extraocular motions full " without nystagmus.   External canals nonobstructive nontender without reddness. Tymphatic membranes bernard with yazan structures intact.   Oral cavity without growths, exudates, and moist.  Posterior pharynx without mass, obstruction, redness.  No thyromegaly, mass, tenderness, lymphadenopathy and supple.  CV: Regular rhythm.  No murmur, gallop,  edema. Posterior pulses intact.  No carotid bruits.  CHEST: No chest wall tenderness or mass.   LUNGS: Symmetric motion with clear/decreased to auscultation.  No dullness to percussion  ABD: Soft, nontender without mass.   ORTHO: Symmetric extremities without swelling/point tenderness except R 1st MTP also swollen/red.  Full gross range of motion.  Walking with assistance cane.  NEURO: CN 2-12 grossly intact.  Symmetric facies and UE/LE.  1-2/5 strength throughout. 1/4 x bicep equal reflexes. Marked hand/foot muscle atrophy.  Nonfocal use extremities. Speech clear.  Reduced light touch with monofilament, vibratory sensation with tuning fork; equal toes/distal feet.    PSYCH: Oriented x 3.  Pleasant calm, well kept.  Purposeful/directed conservation with intact short/long gross memory.     Assessment/Plan     1. Cough    2. Gastroesophageal reflux disease, esophagitis presence not specified    3. Chronic obstructive pulmonary disease, unspecified COPD type (CMS/HCC)    4. Bilateral vocal cord paresis    5. Gout of right foot, unspecified cause, unspecified chronicity    6. History of chronic respiratory failure    7. Gait difficulty    8. COPD exacerbation (CMS/HCC)        Rx: reviewed/changes:  New Medications Ordered This Visit   Medications   • methylPREDNISolone (MEDROL, BETSY,) 4 MG tablet     Sig: Take as directed on package instructions.     Dispense:  1 each     Refill:  0     Pharmacist-tell patient When using steroids Do not use anti-inflammatories such as Motrin/ibuprofen, Alleve/naprosyn, Mobic and like medications   • amoxicillin-clavulanate (AUGMENTIN) 875-125 MG  per tablet     Sig: Take 1 tablet by mouth 2 (Two) Times a Day.     Dispense:  20 tablet     Refill:  0     LAB/Testing/Referrals: reviewed/orders:   Today:   No orders of the defined types were placed in this encounter.    Chronic/recurrent labs above or change to:   Same for now     Discussions:   UE/LE probably more spinal than other  Probably has copd; part of reason not clearing completely  See what labs show to see if more  Needs to treat likely hyperuricemia diet/Zyloprim; chronic gout can cause irreversible joint damage/pain (and kidney stones/renal failure)  Body mass index is 27.88 kg/m².  Patient's Body mass index is 27.88 kg/m². BMI is within normal parameters. No follow-up required..    Tobacco use reviewed:    Non-smoker  Iam Gibbs  reports that he quit smoking about 4 years ago. His smoking use included cigars. He quit smokeless tobacco use about 20 years ago..   There are no Patient Instructions on file for this visit.    Follow up: Return for fasting lab when able.  Future Appointments   Date Time Provider Department Center   1/21/2020  8:20 AM LAB [facility] None

## 2020-01-13 NOTE — TELEPHONE ENCOUNTER
Pharmacy called to see what other medication you would like for the pt? The Advair is not covered by insurance and I did call to see what other medication is covered and they did not give a list to the pharmacy.

## 2020-01-16 DIAGNOSIS — R26.9 GAIT DIFFICULTY: ICD-10-CM

## 2020-01-16 DIAGNOSIS — E78.1 HYPERTRIGLYCERIDEMIA: ICD-10-CM

## 2020-01-16 DIAGNOSIS — M10.9 GOUT OF RIGHT FOOT, UNSPECIFIED CAUSE, UNSPECIFIED CHRONICITY: Primary | ICD-10-CM

## 2020-01-16 DIAGNOSIS — M25.511 CHRONIC RIGHT SHOULDER PAIN: ICD-10-CM

## 2020-01-16 DIAGNOSIS — J38.02 BILATERAL VOCAL CORD PARESIS: ICD-10-CM

## 2020-01-16 DIAGNOSIS — G60.0 CHARCOT-MARIE-TOOTH DISEASE: ICD-10-CM

## 2020-01-16 DIAGNOSIS — Z00.00 WELLNESS EXAMINATION: ICD-10-CM

## 2020-01-16 DIAGNOSIS — Z12.5 ENCOUNTER FOR SCREENING FOR MALIGNANT NEOPLASM OF PROSTATE: ICD-10-CM

## 2020-01-16 DIAGNOSIS — J44.9 CHRONIC OBSTRUCTIVE PULMONARY DISEASE, UNSPECIFIED COPD TYPE (HCC): ICD-10-CM

## 2020-01-16 DIAGNOSIS — R06.1 STRIDOR: ICD-10-CM

## 2020-01-16 DIAGNOSIS — R93.3 ABNORMAL FINDINGS ON DIAGNOSTIC IMAGING OF OTHER PARTS OF DIGESTIVE TRACT: ICD-10-CM

## 2020-01-16 DIAGNOSIS — E55.9 VITAMIN D DEFICIENCY, UNSPECIFIED: ICD-10-CM

## 2020-01-16 DIAGNOSIS — G89.29 CHRONIC NECK PAIN: ICD-10-CM

## 2020-01-16 DIAGNOSIS — G89.29 CHRONIC RIGHT SHOULDER PAIN: ICD-10-CM

## 2020-01-16 DIAGNOSIS — M54.12 CERVICAL RADICULOPATHY: ICD-10-CM

## 2020-01-16 DIAGNOSIS — Z87.09 HISTORY OF CHRONIC RESPIRATORY FAILURE: ICD-10-CM

## 2020-01-16 DIAGNOSIS — M54.2 CHRONIC NECK PAIN: ICD-10-CM

## 2020-01-16 DIAGNOSIS — Z78.9 NON-SMOKER: ICD-10-CM

## 2020-01-16 DIAGNOSIS — K21.9 GASTROESOPHAGEAL REFLUX DISEASE, ESOPHAGITIS PRESENCE NOT SPECIFIED: ICD-10-CM

## 2020-01-16 DIAGNOSIS — M48.02 SPINAL STENOSIS IN CERVICAL REGION: ICD-10-CM

## 2020-01-21 ENCOUNTER — RESULTS ENCOUNTER (OUTPATIENT)
Dept: FAMILY MEDICINE CLINIC | Facility: CLINIC | Age: 55
End: 2020-01-21

## 2020-01-21 ENCOUNTER — OFFICE VISIT (OUTPATIENT)
Dept: FAMILY MEDICINE CLINIC | Facility: CLINIC | Age: 55
End: 2020-01-21

## 2020-01-21 VITALS
HEART RATE: 93 BPM | DIASTOLIC BLOOD PRESSURE: 86 MMHG | OXYGEN SATURATION: 98 % | BODY MASS INDEX: 27.94 KG/M2 | HEIGHT: 67 IN | TEMPERATURE: 98 F | RESPIRATION RATE: 16 BRPM | WEIGHT: 178 LBS | SYSTOLIC BLOOD PRESSURE: 116 MMHG

## 2020-01-21 DIAGNOSIS — J38.02 BILATERAL VOCAL CORD PARESIS: ICD-10-CM

## 2020-01-21 DIAGNOSIS — R93.3 ABNORMAL FINDINGS ON DIAGNOSTIC IMAGING OF OTHER PARTS OF DIGESTIVE TRACT: ICD-10-CM

## 2020-01-21 DIAGNOSIS — M48.02 SPINAL STENOSIS IN CERVICAL REGION: ICD-10-CM

## 2020-01-21 DIAGNOSIS — M25.511 CHRONIC RIGHT SHOULDER PAIN: ICD-10-CM

## 2020-01-21 DIAGNOSIS — M10.9 GOUT OF RIGHT FOOT, UNSPECIFIED CAUSE, UNSPECIFIED CHRONICITY: ICD-10-CM

## 2020-01-21 DIAGNOSIS — Z00.00 WELLNESS EXAMINATION: ICD-10-CM

## 2020-01-21 DIAGNOSIS — J44.9 CHRONIC OBSTRUCTIVE PULMONARY DISEASE, UNSPECIFIED COPD TYPE (HCC): ICD-10-CM

## 2020-01-21 DIAGNOSIS — G89.29 CHRONIC NECK PAIN: ICD-10-CM

## 2020-01-21 DIAGNOSIS — Z87.09 HISTORY OF CHRONIC RESPIRATORY FAILURE: ICD-10-CM

## 2020-01-21 DIAGNOSIS — M10.9 EXACERBATION OF GOUT: Primary | ICD-10-CM

## 2020-01-21 DIAGNOSIS — G60.0 CHARCOT-MARIE-TOOTH DISEASE: ICD-10-CM

## 2020-01-21 DIAGNOSIS — R26.9 GAIT DIFFICULTY: ICD-10-CM

## 2020-01-21 DIAGNOSIS — M54.2 CHRONIC NECK PAIN: ICD-10-CM

## 2020-01-21 DIAGNOSIS — E55.9 VITAMIN D DEFICIENCY, UNSPECIFIED: ICD-10-CM

## 2020-01-21 DIAGNOSIS — K21.9 GASTROESOPHAGEAL REFLUX DISEASE, ESOPHAGITIS PRESENCE NOT SPECIFIED: ICD-10-CM

## 2020-01-21 DIAGNOSIS — R06.1 STRIDOR: ICD-10-CM

## 2020-01-21 DIAGNOSIS — E78.1 HYPERTRIGLYCERIDEMIA: ICD-10-CM

## 2020-01-21 DIAGNOSIS — Z12.5 ENCOUNTER FOR SCREENING FOR MALIGNANT NEOPLASM OF PROSTATE: ICD-10-CM

## 2020-01-21 DIAGNOSIS — Z78.9 NON-SMOKER: ICD-10-CM

## 2020-01-21 DIAGNOSIS — G89.29 CHRONIC RIGHT SHOULDER PAIN: ICD-10-CM

## 2020-01-21 DIAGNOSIS — M54.12 CERVICAL RADICULOPATHY: ICD-10-CM

## 2020-01-21 PROCEDURE — 96372 THER/PROPH/DIAG INJ SC/IM: CPT | Performed by: NURSE PRACTITIONER

## 2020-01-21 PROCEDURE — 99213 OFFICE O/P EST LOW 20 MIN: CPT | Performed by: NURSE PRACTITIONER

## 2020-01-21 RX ORDER — TRIAMCINOLONE ACETONIDE 40 MG/ML
40 INJECTION, SUSPENSION INTRA-ARTICULAR; INTRAMUSCULAR ONCE
Status: COMPLETED | OUTPATIENT
Start: 2020-01-21 | End: 2020-01-21

## 2020-01-21 RX ORDER — METHYLPREDNISOLONE ACETATE 40 MG/ML
40 INJECTION, SUSPENSION INTRA-ARTICULAR; INTRALESIONAL; INTRAMUSCULAR; SOFT TISSUE ONCE
Status: COMPLETED | OUTPATIENT
Start: 2020-01-21 | End: 2020-01-21

## 2020-01-21 RX ADMIN — METHYLPREDNISOLONE ACETATE 40 MG: 40 INJECTION, SUSPENSION INTRA-ARTICULAR; INTRALESIONAL; INTRAMUSCULAR; SOFT TISSUE at 08:50

## 2020-01-21 RX ADMIN — TRIAMCINOLONE ACETONIDE 40 MG: 40 INJECTION, SUSPENSION INTRA-ARTICULAR; INTRAMUSCULAR at 08:51

## 2020-01-21 NOTE — PROGRESS NOTES
Subjective   Chief Complaint:  Gout flareup    History of Present Illness:  This 54 y.o. male was seen in the office today for flareup of gout.  He reports about a month ago he had a flareup in his right #1 toe.  He has had a few steroid Dosepaks, he reports he got gets acutely better but then a whole lot worse few days after the steroids are done.  He does have a uric acid level pending today.  He is already had his blood drawn.  He is waiting to see if possibly he needs to go on a preventative medicine.  He is having increased pain in that joint and also in the right ankle joint as well today.  He denies any acute injury to the ankle.    Past Medical, Surgical, Social, and Family History:  Past Medical History:   Diagnosis Date   • Arthritis    • Back pain    • Charcot-Claire-Tooth disease     DX AT 19    • Claustrophobia    • COPD (chronic obstructive pulmonary disease) (CMS/[facility])    • Elevated cholesterol    • Gait abnormality     DUE TO CMT    • GERD (gastroesophageal reflux disease)    • Hyperlipidemia    • Hypertension    • Irritable bowel    • Other diseases of vocal cords     NARROW VOICE BOX    • Sleep apnea     DOES NOT USE BIPAP OR CPAP      Past Surgical History:   Procedure Laterality Date   • BICEPS TENDONESIS SUBPECTORALIS REPAIR Right 6/28/2019    Procedure: BICEPS TENODESIS - RIGHT;  Surgeon: Yeison Agudelo MD;  Location: [facility] OR;  Service: Orthopedics   • SHOULDER ARTHROSCOPY W/ ROTATOR CUFF REPAIR Right 6/28/2019    Procedure: RIGHT SHOULDER ARTHROSCOPIC ROTATOR CUFF REPAIR, SUBACROMIAL DECOMPRESSION;  Surgeon: Yeison Agudelo MD;  Location: [facility] OR;  Service: Orthopedics     Social History     Socioeconomic History   • Marital status:      Spouse name: Not on file   • Number of children: Not on file   • Years of education: 10   • Highest education level: GED or equivalent   Occupational History   • 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   Tobacco Use   • Smoking status: Former Smoker      Types: Cigars     Last attempt to quit: 10/2015     Years since quittin.3   • Smokeless tobacco: Former User     Quit date:    Substance and Sexual Activity   • Alcohol use: Yes     Alcohol/week: 6.0 standard drinks     Types: 6 Cans of beer per week     Comment: DAILY ON AVERAGE    • Drug use: No   • Sexual activity: Yes     Partners: Female     Family History   Problem Relation Age of Onset   • Cancer Mother    • Diabetes Mother    • Hyperlipidemia Father    • Hypertension Father    • Cancer Father      Review of Systems   Constitutional: Negative for chills and fever.   Respiratory: Negative for cough and shortness of breath.    Cardiovascular: Negative for chest pain and palpitations.   Musculoskeletal: Positive for joint swelling (right ankle and #1 toe joint). Negative for neck pain.     Objective   Physical Exam   Constitutional: He is oriented to person, place, and time. No distress.   HENT:   Head: Normocephalic and atraumatic.   Nose: Nose normal.   Eyes: Pupils are equal, round, and reactive to light. Conjunctivae and EOM are normal.   Neck: Normal range of motion. Neck supple. No JVD present. No tracheal deviation present. No thyromegaly present.   Cardiovascular: Normal rate, regular rhythm, normal heart sounds and intact distal pulses. Exam reveals no gallop and no friction rub.   No murmur heard.  Pulmonary/Chest: Effort normal and breath sounds normal. No respiratory distress. He has no wheezes.   Abdominal: Soft. Bowel sounds are normal. There is no tenderness. There is no guarding.   Musculoskeletal: Normal range of motion. He exhibits edema and tenderness. He exhibits no deformity.   Lymphadenopathy:     He has no cervical adenopathy.   Neurological: He is alert and oriented to person, place, and time.   Skin: Skin is warm and dry. Capillary refill takes less than 2 seconds. He is not diaphoretic.   Psychiatric: He has a normal mood and affect. His behavior is normal. Judgment and thought  "content normal.   Nursing note and vitals reviewed.  /86 (BP Location: Left arm)   Pulse 93   Temp 98 °F (36.7 °C) (Temporal)   Resp 16   Ht 170.2 cm (67\")   Wt 80.7 kg (178 lb)   SpO2 98%   BMI 27.88 kg/m²     Assessment/Plan   Diagnoses and all orders for this visit:    1. Exacerbation of gout (Primary)  -     triamcinolone acetonide (KENALOG-40) injection 40 mg  -     methylPREDNISolone acetate (DEPO-medrol) injection 40 mg    Discussion:  Advised and educated plan of care.  Advised to take colchicine 0.6 mg, 2 tablets now, repeat in 6 hours.  We will give steroid injection with a long-acting mix to buy some time to get his labs back.  Advised await lab results to see what the next step is as far as prevention.    Follow-up:  Return for Will call results and coordinate next steps..    Note e-Signed by RUEL Lyman on 01/21/2020 at 8:36 AM  "

## 2020-01-22 DIAGNOSIS — M10.9 GOUT OF RIGHT FOOT, UNSPECIFIED CAUSE, UNSPECIFIED CHRONICITY: ICD-10-CM

## 2020-01-22 DIAGNOSIS — E61.1 IRON DEFICIENCY: Primary | ICD-10-CM

## 2020-01-22 PROBLEM — E79.0 HYPERURICEMIA: Status: ACTIVE | Noted: 2020-01-22

## 2020-01-22 LAB
25(OH)D3+25(OH)D2 SERPL-MCNC: 29.4 NG/ML (ref 30–100)
ALBUMIN SERPL-MCNC: 4.5 G/DL (ref 3.5–5.2)
ALBUMIN/GLOB SERPL: 2.3 G/DL
ALP SERPL-CCNC: 44 U/L (ref 39–117)
ALT SERPL-CCNC: 22 U/L (ref 1–41)
AST SERPL-CCNC: 17 U/L (ref 1–40)
BASOPHILS # BLD AUTO: 0.07 10*3/MM3 (ref 0–0.2)
BASOPHILS NFR BLD AUTO: 0.7 % (ref 0–1.5)
BILIRUB SERPL-MCNC: 0.4 MG/DL (ref 0.2–1.2)
BUN SERPL-MCNC: 19 MG/DL (ref 6–20)
BUN/CREAT SERPL: 18.3 (ref 7–25)
CALCIUM SERPL-MCNC: 9.4 MG/DL (ref 8.6–10.5)
CHLORIDE SERPL-SCNC: 97 MMOL/L (ref 98–107)
CHOLEST SERPL-MCNC: 183 MG/DL (ref 0–200)
CO2 SERPL-SCNC: 31 MMOL/L (ref 22–29)
CREAT SERPL-MCNC: 1.04 MG/DL (ref 0.76–1.27)
CRP SERPL-MCNC: 1.01 MG/DL (ref 0–0.5)
EOSINOPHIL # BLD AUTO: 0.25 10*3/MM3 (ref 0–0.4)
EOSINOPHIL NFR BLD AUTO: 2.3 % (ref 0.3–6.2)
ERYTHROCYTE [DISTWIDTH] IN BLOOD BY AUTOMATED COUNT: 12.7 % (ref 12.3–15.4)
ERYTHROCYTE [SEDIMENTATION RATE] IN BLOOD BY WESTERGREN METHOD: 6 MM/HR (ref 0–20)
FOLATE SERPL-MCNC: 12.1 NG/ML (ref 4.78–24.2)
GLOBULIN SER CALC-MCNC: 2 GM/DL
GLUCOSE SERPL-MCNC: 92 MG/DL (ref 65–99)
HCT VFR BLD AUTO: 44.9 % (ref 37.5–51)
HDLC SERPL-MCNC: 40 MG/DL (ref 40–60)
HGB BLD-MCNC: 15.3 G/DL (ref 13–17.7)
IMM GRANULOCYTES # BLD AUTO: 0.08 10*3/MM3 (ref 0–0.05)
IMM GRANULOCYTES NFR BLD AUTO: 0.7 % (ref 0–0.5)
IRON SERPL-MCNC: 44 MCG/DL (ref 59–158)
LDLC SERPL CALC-MCNC: 113 MG/DL (ref 0–100)
LYMPHOCYTES # BLD AUTO: 1.27 10*3/MM3 (ref 0.7–3.1)
LYMPHOCYTES NFR BLD AUTO: 11.9 % (ref 19.6–45.3)
MCH RBC QN AUTO: 30.5 PG (ref 26.6–33)
MCHC RBC AUTO-ENTMCNC: 34.1 G/DL (ref 31.5–35.7)
MCV RBC AUTO: 89.6 FL (ref 79–97)
MONOCYTES # BLD AUTO: 1.03 10*3/MM3 (ref 0.1–0.9)
MONOCYTES NFR BLD AUTO: 9.6 % (ref 5–12)
NEUTROPHILS # BLD AUTO: 7.98 10*3/MM3 (ref 1.7–7)
NEUTROPHILS NFR BLD AUTO: 74.8 % (ref 42.7–76)
NRBC BLD AUTO-RTO: 0 /100 WBC (ref 0–0.2)
PLATELET # BLD AUTO: 292 10*3/MM3 (ref 140–450)
POTASSIUM SERPL-SCNC: 4.3 MMOL/L (ref 3.5–5.2)
PROT SERPL-MCNC: 6.5 G/DL (ref 6–8.5)
PSA SERPL-MCNC: 0.66 NG/ML (ref 0–4)
RBC # BLD AUTO: 5.01 10*6/MM3 (ref 4.14–5.8)
SODIUM SERPL-SCNC: 139 MMOL/L (ref 136–145)
T4 FREE SERPL-MCNC: 1.31 NG/DL (ref 0.93–1.7)
TRIGL SERPL-MCNC: 150 MG/DL (ref 0–150)
TSH SERPL DL<=0.005 MIU/L-ACNC: 1.01 UIU/ML (ref 0.27–4.2)
URATE SERPL-MCNC: 7.3 MG/DL (ref 3.4–7)
VIT B12 SERPL-MCNC: 495 PG/ML (ref 211–946)
VLDLC SERPL CALC-MCNC: 30 MG/DL
WBC # BLD AUTO: 10.68 10*3/MM3 (ref 3.4–10.8)

## 2020-01-22 RX ORDER — ALLOPURINOL 100 MG/1
100 TABLET ORAL DAILY
Qty: 30 TABLET | Refills: 5 | Status: SHIPPED | OUTPATIENT
Start: 2020-01-22 | End: 2020-01-22 | Stop reason: SDUPTHER

## 2020-01-22 RX ORDER — ALLOPURINOL 100 MG/1
100 TABLET ORAL 2 TIMES DAILY
Qty: 60 TABLET | Refills: 5 | Status: SHIPPED | OUTPATIENT
Start: 2020-01-22 | End: 2021-01-27

## 2020-01-27 ENCOUNTER — CLINICAL SUPPORT (OUTPATIENT)
Dept: FAMILY MEDICINE CLINIC | Facility: CLINIC | Age: 55
End: 2020-01-27

## 2020-01-27 ENCOUNTER — RESULTS ENCOUNTER (OUTPATIENT)
Dept: FAMILY MEDICINE CLINIC | Facility: CLINIC | Age: 55
End: 2020-01-27

## 2020-01-27 DIAGNOSIS — E61.1 IRON DEFICIENCY: ICD-10-CM

## 2020-01-27 DIAGNOSIS — D50.8 OTHER IRON DEFICIENCY ANEMIA: Primary | ICD-10-CM

## 2020-01-27 PROBLEM — K92.1 BLOOD IN STOOL: Status: ACTIVE | Noted: 2020-01-27

## 2020-01-27 LAB
EXPIRATION DATE 2: ABNORMAL
EXPIRATION DATE 3: ABNORMAL
EXPIRATION DATE: ABNORMAL
GASTROCULT GAST QL: POSITIVE
HEMOCCULT SP2 STL QL: NEGATIVE
HEMOCCULT SP3 STL QL: POSITIVE
Lab: ABNORMAL

## 2020-01-27 PROCEDURE — G0328 FECAL BLOOD SCRN IMMUNOASSAY: HCPCS | Performed by: FAMILY MEDICINE

## 2020-01-28 DIAGNOSIS — R19.5 HEME POSITIVE STOOL: Primary | ICD-10-CM

## 2020-01-28 DIAGNOSIS — D50.8 OTHER IRON DEFICIENCY ANEMIA: ICD-10-CM

## 2020-01-28 DIAGNOSIS — D50.8 OTHER IRON DEFICIENCY ANEMIA: Primary | ICD-10-CM

## 2020-02-05 ENCOUNTER — OFFICE VISIT (OUTPATIENT)
Dept: GASTROENTEROLOGY | Facility: CLINIC | Age: 55
End: 2020-02-05

## 2020-02-05 VITALS
TEMPERATURE: 97.4 F | HEART RATE: 95 BPM | DIASTOLIC BLOOD PRESSURE: 84 MMHG | HEIGHT: 67 IN | BODY MASS INDEX: 27.15 KG/M2 | SYSTOLIC BLOOD PRESSURE: 118 MMHG | WEIGHT: 173 LBS | OXYGEN SATURATION: 98 %

## 2020-02-05 DIAGNOSIS — R19.5 HEME POSITIVE STOOL: Primary | ICD-10-CM

## 2020-02-05 PROCEDURE — 99202 OFFICE O/P NEW SF 15 MIN: CPT | Performed by: INTERNAL MEDICINE

## 2020-02-05 NOTE — H&P (VIEW-ONLY)
Chief Complaint   Patient presents with   • Anemia     anemia  blood in stool       PCP: Nick Jackson MD  REFER: Nick Jackson MD    Subjective     Heartburn   He complains of globus sensation. He reports no abdominal pain, no belching, no chest pain, no choking, no coughing, no dysphagia, no early satiety, no heartburn, no nausea, no sore throat or no wheezing. This is a chronic problem. The current episode started more than 1 year ago (15 yrs). The problem occurs rarely. The problem has been waxing and waning. Pertinent negatives include no fatigue. Risk factors include caffeine use. He has tried a histamine-2 antagonist for the symptoms. The treatment provided significant relief. Past procedures include an EGD (long time ago).       Past Medical History:   Diagnosis Date   • Arthritis    • Back pain    • Charcot-Claire-Tooth disease     DX AT 19    • Claustrophobia    • COPD (chronic obstructive pulmonary disease) (CMS/HCC)    • Elevated cholesterol    • Gait abnormality     DUE TO CMT    • GERD (gastroesophageal reflux disease)    • Hyperlipidemia    • Hypertension    • Irritable bowel    • Other diseases of vocal cords     NARROW VOICE BOX    • Sleep apnea     DOES NOT USE BIPAP OR CPAP        Past Surgical History:   Procedure Laterality Date   • BICEPS TENDONESIS SUBPECTORALIS REPAIR Right 6/28/2019    Procedure: BICEPS TENODESIS - RIGHT;  Surgeon: Yeison Agudelo MD;  Location:  [facility] OR;  Service: Orthopedics   • SHOULDER ARTHROSCOPY W/ ROTATOR CUFF REPAIR Right 6/28/2019    Procedure: RIGHT SHOULDER ARTHROSCOPIC ROTATOR CUFF REPAIR, SUBACROMIAL DECOMPRESSION;  Surgeon: Yeison Agudelo MD;  Location:  [facility] OR;  Service: Orthopedics       Outpatient Medications Marked as Taking for the 2/5/20 encounter (Office Visit) with Cal Vela, DO   Medication Sig Dispense Refill   • albuterol (PROVENTIL) (2.5 MG/3ML) 0.083% nebulizer solution 3 mL Every 8 (Eight) Hours.     • albuterol sulfate HFA  (VENTOLIN HFA) 108 (90 Base) MCG/ACT inhaler Inhale 2 puffs Every 4 (Four) Hours.     • allopurinol (ZYLOPRIM) 100 MG tablet Take 1 tablet by mouth 2 (Two) Times a Day. 60 tablet 5   • budesonide-formoterol (SYMBICORT) 160-4.5 MCG/ACT inhaler Inhale 2 puffs 2 (Two) Times a Day. 3 inhaler 2   • busPIRone (BUSPAR) 15 MG tablet Take 7.5 mg by mouth 2 (Two) Times a Day. Usually half in am     • COLCRYS 0.6 MG tablet      • gemfibrozil (LOPID) 600 MG tablet Take 600 mg by mouth Daily.     • hydrochlorothiazide (HYDRODIURIL) 25 MG tablet Take 25 mg by mouth Daily.     • ibuprofen (ADVIL,MOTRIN) 600 MG tablet Take 600 mg by mouth Every 6 (Six) Hours As Needed for Mild Pain .     • losartan (COZAAR) 25 MG tablet Take 1 tablet by mouth Daily.     • raNITIdine (ZANTAC) 150 MG tablet Take 150 mg by mouth 2 (Two) Times a Day As Needed for Heartburn. Usually take daily     • tadalafil (CIALIS) 20 MG tablet Take 20 mg by mouth Daily As Needed for Erectile Dysfunction.         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   Tobacco Use   • Smoking status: Former Smoker     Types: Cigars     Last attempt to quit: 10/2015     Years since quittin.3   • Smokeless tobacco: Former User     Quit date:    Substance and Sexual Activity   • Alcohol use: Yes     Comment: weekly   • Drug use: No   • Sexual activity: Yes     Partners: Female       Family History   Problem Relation Age of Onset   • Cancer Mother    • Diabetes Mother    • Colon polyps Mother    • Hyperlipidemia Father    • Hypertension Father    • Cancer Father    • Colon cancer Neg Hx        Review of Systems   Constitutional: Negative for fatigue,  "fever and unexpected weight change.   HENT: Negative for hearing loss, sore throat and voice change.    Eyes: Negative for visual disturbance.   Respiratory: Negative for cough, choking, shortness of breath and wheezing.    Cardiovascular: Negative for chest pain and palpitations.   Gastrointestinal: Negative for abdominal pain, blood in stool, dysphagia, heartburn, nausea and vomiting.   Endocrine: Negative for polydipsia and polyuria.   Genitourinary: Negative for difficulty urinating, dysuria, hematuria and urgency.   Musculoskeletal: Negative for joint swelling and myalgias.   Skin: Negative for color change, rash and wound.   Neurological: Negative for dizziness, tremors, seizures and syncope.   Hematological: Does not bruise/bleed easily.   Psychiatric/Behavioral: Negative for agitation and confusion. The patient is not nervous/anxious.        Objective     Vitals:    02/05/20 1405   BP: 118/84   Pulse: 95   Temp: 97.4 °F (36.3 °C)   SpO2: 98%   Weight: 78.5 kg (173 lb)   Height: 170.2 cm (67\")     Body mass index is 27.1 kg/m².    Physical Exam   Constitutional: He is oriented to person, place, and time. He appears well-developed and well-nourished.   HENT:   Head: Normocephalic and atraumatic.   Eyes:   Pink, Nonicteric   Neck:   Global Assessment- supple. No JVD or lymphadenopathy   Cardiovascular: Normal rate, regular rhythm and normal heart sounds. Exam reveals no gallop and no friction rub.   No murmur heard.  Pulmonary/Chest: Effort normal and breath sounds normal. No respiratory distress. He has no wheezes. He has no rales.   Inspection: Movements-Symmetrical   Abdominal: Soft. Bowel sounds are normal. He exhibits no distension and no mass. There is no tenderness. There is no rebound and no guarding.   Neurological: He is alert and oriented to person, place, and time.   General Exam-Deemed a reliable historian, able to converse without difficulty and Able to move all extremities without difficulty "       Imaging Results (Most Recent)     None          Body mass index is 27.1 kg/m².    Assessment/Plan     CHIKA was seen today for anemia.    Diagnoses and all orders for this visit:    Heme positive stool  -     Case Request; Standing  -     Case Request    Other orders  -     Follow Anesthesia Guidelines / Standing Orders; Future  -     Obtain Informed Consent; Future        COLONOSCOPY WITH ANESTHESIA (N/A)    Patient's Body mass index is 27.1 kg/m². BMI is within normal parameters. No follow-up required..      There are no Patient Instructions on file for this visit.

## 2020-02-06 PROBLEM — R19.5 HEME POSITIVE STOOL: Status: ACTIVE | Noted: 2020-02-06

## 2020-02-17 ENCOUNTER — HOSPITAL ENCOUNTER (OUTPATIENT)
Facility: HOSPITAL | Age: 55
Setting detail: HOSPITAL OUTPATIENT SURGERY
Discharge: HOME | End: 2020-02-17
Attending: INTERNAL MEDICINE | Admitting: INTERNAL MEDICINE

## 2020-02-17 ENCOUNTER — ANESTHESIA (OUTPATIENT)
Dept: GASTROENTEROLOGY | Facility: HOSPITAL | Age: 55
End: 2020-02-17

## 2020-02-17 ENCOUNTER — TELEPHONE (OUTPATIENT)
Dept: GASTROENTEROLOGY | Facility: CLINIC | Age: 55
End: 2020-02-17

## 2020-02-17 ENCOUNTER — ANESTHESIA EVENT (OUTPATIENT)
Dept: GASTROENTEROLOGY | Facility: HOSPITAL | Age: 55
End: 2020-02-17

## 2020-02-17 VITALS
RESPIRATION RATE: 28 BRPM | BODY MASS INDEX: 27.48 KG/M2 | HEART RATE: 98 BPM | TEMPERATURE: 97.6 F | OXYGEN SATURATION: 97 % | DIASTOLIC BLOOD PRESSURE: 85 MMHG | SYSTOLIC BLOOD PRESSURE: 110 MMHG | WEIGHT: 171 LBS | HEIGHT: 66 IN

## 2020-02-17 DIAGNOSIS — R19.5 HEME POSITIVE STOOL: ICD-10-CM

## 2020-02-17 PROCEDURE — 45385 COLONOSCOPY W/LESION REMOVAL: CPT | Performed by: INTERNAL MEDICINE

## 2020-02-17 PROCEDURE — 88305 TISSUE EXAM BY PATHOLOGIST: CPT | Performed by: INTERNAL MEDICINE

## 2020-02-17 PROCEDURE — 25010000002 PROPOFOL 10 MG/ML EMULSION: Performed by: NURSE ANESTHETIST, CERTIFIED REGISTERED

## 2020-02-17 RX ORDER — GEMFIBROZIL 600 MG/1
600 TABLET, FILM COATED ORAL DAILY
Qty: 90 TABLET | Refills: 1 | Status: SHIPPED | OUTPATIENT
Start: 2020-02-17 | End: 2020-11-10

## 2020-02-17 RX ORDER — PROPOFOL 10 MG/ML
VIAL (ML) INTRAVENOUS AS NEEDED
Status: DISCONTINUED | OUTPATIENT
Start: 2020-02-17 | End: 2020-02-17 | Stop reason: SURG

## 2020-02-17 RX ORDER — TADALAFIL 20 MG/1
20 TABLET ORAL DAILY PRN
Qty: 30 TABLET | Refills: 1 | Status: SHIPPED | OUTPATIENT
Start: 2020-02-17 | End: 2020-04-21 | Stop reason: SINTOL

## 2020-02-17 RX ORDER — SODIUM CHLORIDE 9 MG/ML
500 INJECTION, SOLUTION INTRAVENOUS CONTINUOUS PRN
Status: DISCONTINUED | OUTPATIENT
Start: 2020-02-17 | End: 2020-02-17 | Stop reason: HOSPADM

## 2020-02-17 RX ORDER — SODIUM CHLORIDE 0.9 % (FLUSH) 0.9 %
10 SYRINGE (ML) INJECTION AS NEEDED
Status: DISCONTINUED | OUTPATIENT
Start: 2020-02-17 | End: 2020-02-17 | Stop reason: HOSPADM

## 2020-02-17 RX ORDER — HYDROCHLOROTHIAZIDE 25 MG/1
25 TABLET ORAL DAILY
Qty: 90 TABLET | Refills: 1 | Status: SHIPPED | OUTPATIENT
Start: 2020-02-17 | End: 2020-08-21

## 2020-02-17 RX ADMIN — PROPOFOL 230 MG: 10 INJECTION, EMULSION INTRAVENOUS at 08:50

## 2020-02-17 RX ADMIN — SODIUM CHLORIDE 500 ML: 9 INJECTION, SOLUTION INTRAVENOUS at 07:42

## 2020-02-17 NOTE — ANESTHESIA POSTPROCEDURE EVALUATION
Patient: CHIKA Gibbs    Procedure Summary     Date:  02/17/20 Room / Location:  [facility] ENDOSCOPY 5 / [facility]    Anesthesia Start:  0845 Anesthesia Stop:  0902    Procedure:  COLONOSCOPY WITH ANESTHESIA (N/A ) Diagnosis:       Heme positive stool      (Heme positive stool [R19.5])    Surgeon:  Cal Vela DO Provider:  Jodi Hayes CRNA    Anesthesia Type:  MAC ASA Status:  2          Anesthesia Type: MAC    Vitals  Vitals Value Taken Time   /79 2/17/2020  9:20 AM   Temp     Pulse 84 2/17/2020  9:22 AM   Resp 28 2/17/2020  9:15 AM   SpO2 99 % 2/17/2020  9:22 AM   Vitals shown include unvalidated device data.        Post Anesthesia Care and Evaluation    Patient location during evaluation: PHASE II  Patient participation: complete - patient participated  Level of consciousness: awake and alert  Pain management: adequate  Airway patency: patent  Anesthetic complications: No anesthetic complications  PONV Status: none  Cardiovascular status: acceptable  Respiratory status: acceptable  Hydration status: acceptable

## 2020-02-17 NOTE — TELEPHONE ENCOUNTER
Heme Positive Stool   Neg Colonoscopy  Schedule EGD  Patient left with instructions.   02/25/2020 at 7:15am     Need case request.     Thank you

## 2020-02-17 NOTE — ANESTHESIA PREPROCEDURE EVALUATION
Anesthesia Evaluation     history of anesthetic complications: difficult airway               Airway   Mallampati: II  TM distance: >3 FB  Neck ROM: full  Dental      Pulmonary    (+) COPD, sleep apnea,     ROS comment: Vocal cord paralysis    Cardiovascular   Exercise tolerance: excellent (>7 METS)    (+) hypertension,       Neuro/Psych  GI/Hepatic/Renal/Endo    (+)  GERD,      Musculoskeletal     Abdominal    Substance History      OB/GYN          Other                        Anesthesia Plan    ASA 2     MAC       Anesthetic plan, all risks, benefits, and alternatives have been provided, discussed and informed consent has been obtained with: patient.

## 2020-02-18 LAB
LAB AP CASE REPORT: NORMAL
PATH REPORT.FINAL DX SPEC: NORMAL
PATH REPORT.GROSS SPEC: NORMAL

## 2020-02-19 ENCOUNTER — PREP FOR SURGERY (OUTPATIENT)
Dept: OTHER | Facility: HOSPITAL | Age: 55
End: 2020-02-19

## 2020-02-19 DIAGNOSIS — R19.5 HEME POSITIVE STOOL: Primary | ICD-10-CM

## 2020-02-22 ENCOUNTER — RESULTS ENCOUNTER (OUTPATIENT)
Dept: FAMILY MEDICINE CLINIC | Facility: CLINIC | Age: 55
End: 2020-02-22

## 2020-02-22 DIAGNOSIS — M10.9 GOUT OF RIGHT FOOT, UNSPECIFIED CAUSE, UNSPECIFIED CHRONICITY: ICD-10-CM

## 2020-02-25 ENCOUNTER — ANESTHESIA EVENT (OUTPATIENT)
Dept: GASTROENTEROLOGY | Facility: HOSPITAL | Age: 55
End: 2020-02-25

## 2020-02-25 ENCOUNTER — ANESTHESIA (OUTPATIENT)
Dept: GASTROENTEROLOGY | Facility: HOSPITAL | Age: 55
End: 2020-02-25

## 2020-02-25 ENCOUNTER — HOSPITAL ENCOUNTER (OUTPATIENT)
Facility: HOSPITAL | Age: 55
Setting detail: HOSPITAL OUTPATIENT SURGERY
Discharge: HOME | End: 2020-02-25
Attending: INTERNAL MEDICINE | Admitting: INTERNAL MEDICINE

## 2020-02-25 VITALS
DIASTOLIC BLOOD PRESSURE: 88 MMHG | SYSTOLIC BLOOD PRESSURE: 113 MMHG | BODY MASS INDEX: 27.64 KG/M2 | HEIGHT: 66 IN | TEMPERATURE: 98.3 F | OXYGEN SATURATION: 94 % | WEIGHT: 172 LBS | HEART RATE: 88 BPM | RESPIRATION RATE: 18 BRPM

## 2020-02-25 DIAGNOSIS — R19.5 HEME POSITIVE STOOL: ICD-10-CM

## 2020-02-25 PROCEDURE — 87081 CULTURE SCREEN ONLY: CPT | Performed by: INTERNAL MEDICINE

## 2020-02-25 PROCEDURE — 43239 EGD BIOPSY SINGLE/MULTIPLE: CPT | Performed by: INTERNAL MEDICINE

## 2020-02-25 PROCEDURE — 88305 TISSUE EXAM BY PATHOLOGIST: CPT | Performed by: INTERNAL MEDICINE

## 2020-02-25 PROCEDURE — 25010000002 PROPOFOL 10 MG/ML EMULSION: Performed by: NURSE ANESTHETIST, CERTIFIED REGISTERED

## 2020-02-25 RX ORDER — SODIUM CHLORIDE 9 MG/ML
500 INJECTION, SOLUTION INTRAVENOUS CONTINUOUS PRN
Status: DISCONTINUED | OUTPATIENT
Start: 2020-02-25 | End: 2020-02-25 | Stop reason: HOSPADM

## 2020-02-25 RX ORDER — LIDOCAINE HYDROCHLORIDE 10 MG/ML
0.5 INJECTION, SOLUTION EPIDURAL; INFILTRATION; INTRACAUDAL; PERINEURAL ONCE AS NEEDED
Status: DISCONTINUED | OUTPATIENT
Start: 2020-02-25 | End: 2020-02-25 | Stop reason: HOSPADM

## 2020-02-25 RX ORDER — PROPOFOL 10 MG/ML
VIAL (ML) INTRAVENOUS AS NEEDED
Status: DISCONTINUED | OUTPATIENT
Start: 2020-02-25 | End: 2020-02-25 | Stop reason: SURG

## 2020-02-25 RX ORDER — SODIUM CHLORIDE 0.9 % (FLUSH) 0.9 %
10 SYRINGE (ML) INJECTION AS NEEDED
Status: DISCONTINUED | OUTPATIENT
Start: 2020-02-25 | End: 2020-02-25 | Stop reason: HOSPADM

## 2020-02-25 RX ADMIN — LIDOCAINE HYDROCHLORIDE 80 MG: 20 INJECTION, SOLUTION INTRAVENOUS at 09:11

## 2020-02-25 RX ADMIN — PROPOFOL 220 MG: 10 INJECTION, EMULSION INTRAVENOUS at 09:11

## 2020-02-25 RX ADMIN — SODIUM CHLORIDE 500 ML: 9 INJECTION, SOLUTION INTRAVENOUS at 08:00

## 2020-02-25 NOTE — ANESTHESIA POSTPROCEDURE EVALUATION
Patient: Raoul Gibbs    Procedure Summary     Date:  02/25/20 Room / Location:  [facility] ENDOSCOPY 4 / [facility] END[facility]COPY    Anesthesia Start:  0900 Anesthesia Stop:  0918    Procedure:  ESOPHAGOGASTRODUODENOSCOPY WITH ANESTHESIA (N/A ) Diagnosis:       Heme positive stool      (Heme positive stool [R19.5])    Surgeon:  Cal Vela DO Provider:  Michelle Moreno CRNA    Anesthesia Type:  MAC ASA Status:  2          Anesthesia Type: MAC    Vitals  Vitals Value Taken Time   /88 2/25/2020  9:35 AM   Temp     Pulse 82 2/25/2020  9:41 AM   Resp 18 2/25/2020  9:35 AM   SpO2 94 % 2/25/2020  9:41 AM   Vitals shown include unvalidated device data.        Post Anesthesia Care and Evaluation    Patient location during evaluation: PHASE II  Patient participation: complete - patient participated  Level of consciousness: awake and alert  Pain management: adequate  Airway patency: patent  Anesthetic complications: No anesthetic complications  PONV Status: none  Cardiovascular status: acceptable  Respiratory status: acceptable  Hydration status: acceptable  No anesthesia care post op

## 2020-02-26 LAB
CYTO UR: NORMAL
LAB AP CASE REPORT: NORMAL
PATH REPORT.FINAL DX SPEC: NORMAL
PATH REPORT.GROSS SPEC: NORMAL
UREASE TISS QL: NEGATIVE

## 2020-03-03 ENCOUNTER — TELEPHONE (OUTPATIENT)
Dept: GASTROENTEROLOGY | Facility: CLINIC | Age: 55
End: 2020-03-03

## 2020-03-03 NOTE — TELEPHONE ENCOUNTER
----- Message from Cal Vela DO sent at 3/3/2020 12:56 PM CST -----  Regarding: bx  Please let patient know small bowel biopsy was negative     Thank you      ----- Message -----  From: Lab, Background User  Sent: 2/26/2020   9:54 AM CST  To: Cal Vela DO

## 2020-03-26 DIAGNOSIS — E61.1 LOW IRON: Primary | ICD-10-CM

## 2020-03-27 ENCOUNTER — RESULTS ENCOUNTER (OUTPATIENT)
Dept: FAMILY MEDICINE CLINIC | Facility: CLINIC | Age: 55
End: 2020-03-27

## 2020-03-27 DIAGNOSIS — E61.1 LOW IRON: ICD-10-CM

## 2020-04-21 ENCOUNTER — TELEPHONE (OUTPATIENT)
Dept: FAMILY MEDICINE CLINIC | Facility: CLINIC | Age: 55
End: 2020-04-21

## 2020-04-21 RX ORDER — SILDENAFIL 25 MG/1
25 TABLET, FILM COATED ORAL DAILY PRN
Qty: 90 TABLET | Refills: 1 | Status: SHIPPED | OUTPATIENT
Start: 2020-04-21 | End: 2020-11-11

## 2020-04-21 RX ORDER — LOSARTAN POTASSIUM 25 MG/1
25 TABLET ORAL DAILY
Qty: 90 TABLET | Refills: 1 | Status: SHIPPED | OUTPATIENT
Start: 2020-04-21 | End: 2020-11-10

## 2020-04-21 NOTE — TELEPHONE ENCOUNTER
Part tadalafil 20 on intolerance list; feels bad  Ok to have Rx sildenafil/viagra  Ok 90 days    ----- Message from Mitzi Floyd LPN sent at 4/21/2020 11:47 AM CDT -----  Regarding: RE: Prescription Question  Contact: [phone]  Pt want viagra, is this ok?    ----- Message -----  From: Raoul Gibbs  Sent: 4/21/2020  11:39 AM CDT  To: Magi Espinoza [facility]  Subject: Prescription Question                            I was taking sildenafil 20 mg for Erectile dis function and it worked well for me. You switched me to Tadalafil 20 mg and it works but makes me feel awful! Can you please send me a script of the sildenafil 20 mg to my pharmacy..  Also I take losartan  pot 25 mg, would you please make these a 90 day supply like the rest of my medicine..   I know you all are busy but would greatly appreciate this! Thanks!

## 2020-05-27 ENCOUNTER — TELEPHONE (OUTPATIENT)
Dept: FAMILY MEDICINE CLINIC | Facility: CLINIC | Age: 55
End: 2020-05-27

## 2020-05-27 ENCOUNTER — PATIENT MESSAGE (OUTPATIENT)
Dept: FAMILY MEDICINE CLINIC | Facility: CLINIC | Age: 55
End: 2020-05-27

## 2020-05-27 RX ORDER — BUSPIRONE HYDROCHLORIDE 15 MG/1
7.5 TABLET ORAL 2 TIMES DAILY
Qty: 30 TABLET | Refills: 0 | Status: SHIPPED | OUTPATIENT
Start: 2020-05-27 | End: 2021-01-27

## 2020-05-27 RX ORDER — FAMOTIDINE 10 MG
20 TABLET ORAL 2 TIMES DAILY PRN
Qty: 60 TABLET | Refills: 0 | Status: SHIPPED | OUTPATIENT
Start: 2020-05-27 | End: 2020-06-01

## 2020-05-27 NOTE — TELEPHONE ENCOUNTER
Remind patient of need to tejinder labs  May refill his Rx while doing this    Regarding: Prescription Question  ----- Message from Mitzi Floyd LPN sent at 2020  1:48 PM CDT -----  Pt last lab , no showed for anemia lab follow up and has no follow up appt on the books, is this ok?     ----- Message from Raoul Gibbs Randy Eugene, MD sent at 2020  1:05 PM -----   My prescriptions for Buspirone 15 mg  And for ranitidine 150 mg have both . Would you please get these renewed at [facility] for me. Thank you much!   I know you all are busy, thank you  for what you do!!

## 2020-05-27 NOTE — TELEPHONE ENCOUNTER
Attempted to call and left detailed vm, did send reply back per my chart message, did send in one month rx

## 2020-05-29 ENCOUNTER — PATIENT MESSAGE (OUTPATIENT)
Dept: FAMILY MEDICINE CLINIC | Facility: CLINIC | Age: 55
End: 2020-05-29

## 2020-05-29 LAB
BASOPHILS # BLD AUTO: 0.06 10*3/MM3 (ref 0–0.2)
BASOPHILS NFR BLD AUTO: 1 % (ref 0–1.5)
EOSINOPHIL # BLD AUTO: 0.39 10*3/MM3 (ref 0–0.4)
EOSINOPHIL NFR BLD AUTO: 6.3 % (ref 0.3–6.2)
ERYTHROCYTE [DISTWIDTH] IN BLOOD BY AUTOMATED COUNT: 12.3 % (ref 12.3–15.4)
HCT VFR BLD AUTO: 47.7 % (ref 37.5–51)
HCV AB S/CO SERPL IA: <0.1 S/CO RATIO (ref 0–0.9)
HGB BLD-MCNC: 16.6 G/DL (ref 13–17.7)
IMM GRANULOCYTES # BLD AUTO: 0.03 10*3/MM3 (ref 0–0.05)
IMM GRANULOCYTES NFR BLD AUTO: 0.5 % (ref 0–0.5)
IRON SERPL-MCNC: 101 MCG/DL (ref 59–158)
LYMPHOCYTES # BLD AUTO: 1.32 10*3/MM3 (ref 0.7–3.1)
LYMPHOCYTES NFR BLD AUTO: 21.3 % (ref 19.6–45.3)
MCH RBC QN AUTO: 31.6 PG (ref 26.6–33)
MCHC RBC AUTO-ENTMCNC: 34.8 G/DL (ref 31.5–35.7)
MCV RBC AUTO: 90.7 FL (ref 79–97)
MONOCYTES # BLD AUTO: 0.58 10*3/MM3 (ref 0.1–0.9)
MONOCYTES NFR BLD AUTO: 9.4 % (ref 5–12)
NEUTROPHILS # BLD AUTO: 3.81 10*3/MM3 (ref 1.7–7)
NEUTROPHILS NFR BLD AUTO: 61.5 % (ref 42.7–76)
NRBC BLD AUTO-RTO: 0 /100 WBC (ref 0–0.2)
PLATELET # BLD AUTO: 251 10*3/MM3 (ref 140–450)
RBC # BLD AUTO: 5.26 10*6/MM3 (ref 4.14–5.8)
WBC # BLD AUTO: 6.19 10*3/MM3 (ref 3.4–10.8)

## 2020-06-01 ENCOUNTER — TELEPHONE (OUTPATIENT)
Dept: FAMILY MEDICINE CLINIC | Facility: CLINIC | Age: 55
End: 2020-06-01

## 2020-06-01 DIAGNOSIS — R06.1 STRIDOR: ICD-10-CM

## 2020-06-01 DIAGNOSIS — E61.1 LOW IRON: Primary | ICD-10-CM

## 2020-06-01 DIAGNOSIS — E61.1 IRON DEFICIENCY: ICD-10-CM

## 2020-06-01 DIAGNOSIS — K21.9 GASTROESOPHAGEAL REFLUX DISEASE, ESOPHAGITIS PRESENCE NOT SPECIFIED: ICD-10-CM

## 2020-06-01 DIAGNOSIS — J38.02 BILATERAL VOCAL CORD PARESIS: ICD-10-CM

## 2020-06-01 RX ORDER — FERROUS SULFATE 325(65) MG
325 TABLET ORAL
COMMUNITY
Start: 2020-01-21 | End: 2020-06-01

## 2020-06-01 RX ORDER — PANTOPRAZOLE SODIUM 40 MG/1
40 TABLET, DELAYED RELEASE ORAL DAILY
Qty: 90 TABLET | Refills: 1 | Status: SHIPPED | OUTPATIENT
Start: 2020-06-01 | End: 2020-11-10

## 2020-06-01 NOTE — TELEPHONE ENCOUNTER
"Allergies   Allergen Reactions   • Adhesive Tape Rash     VERY SENSITIVE TO ADHESIVE    • Tadalafil Other (See Comments)     \"feels bad\"     Explain no zantac  Stop pepcid  Start protonix 40    Current Outpatient Medications:   •  acetaminophen (TYLENOL) 500 MG tablet, Take 500 mg by mouth Every 6 (Six) Hours As Needed for Mild Pain ., Disp: , Rfl:   •  albuterol (PROVENTIL) (2.5 MG/3ML) 0.083% nebulizer solution, 3 mL Every 8 (Eight) Hours., Disp: , Rfl:   •  albuterol sulfate HFA (VENTOLIN HFA) 108 (90 Base) MCG/ACT inhaler, Inhale 2 puffs Every 4 (Four) Hours., Disp: , Rfl:   •  allopurinol (ZYLOPRIM) 100 MG tablet, Take 1 tablet by mouth 2 (Two) Times a Day., Disp: 60 tablet, Rfl: 5  •  budesonide-formoterol (SYMBICORT) 160-4.5 MCG/ACT inhaler, Inhale 2 puffs 2 (Two) Times a Day., Disp: 3 inhaler, Rfl: 2  •  busPIRone (BUSPAR) 15 MG tablet, Take 0.5 tablets by mouth 2 (Two) Times a Day. Usually half in am, Disp: 30 tablet, Rfl: 0  •  COLCRYS 0.6 MG tablet, , Disp: , Rfl:   •  famotidine (PEPCID) 10 MG tablet, Take 2 tablets by mouth 2 (Two) Times a Day As Needed for Indigestion or Heartburn., Disp: 60 tablet, Rfl: 0  •  gemfibrozil (LOPID) 600 MG tablet, Take 1 tablet by mouth Daily., Disp: 90 tablet, Rfl: 1  •  hydroCHLOROthiazide (HYDRODIURIL) 25 MG tablet, Take 1 tablet by mouth Daily., Disp: 90 tablet, Rfl: 1  •  ibuprofen (ADVIL,MOTRIN) 600 MG tablet, Take 600 mg by mouth Every 6 (Six) Hours As Needed for Mild Pain ., Disp: , Rfl:   •  losartan (COZAAR) 25 MG tablet, Take 1 tablet by mouth Daily., Disp: 90 tablet, Rfl: 1  •  sildenafil (Viagra) 25 MG tablet, Take 1 tablet by mouth Daily As Needed for Erectile Dysfunction., Disp: 90 tablet, Rfl: 1    "

## 2020-06-01 NOTE — TELEPHONE ENCOUNTER
Ok change back    Regarding: RE:lab question/results  Contact: [phone]  ----- Message from Mitzi Floyd LPN sent at 6/1/2020  2:55 PM CDT -----  Wants to go back to zantac?, was put on Pepcid 10mg take two bid     ----- Message from Raoul Gibbs to Mitzi Floyd LPN sent at 6/1/2020 10:47 AM -----   Good morning Mitzi,  Want to request that he switch me back to ranitidine 150 because this new one that y’all put me on just isn’t working for me. The ranitidine has worked well for a long time..     Thanks!  ----- Message -----  From: JEVON RIZVI  Sent: 6/1/2020  9:42 AM EDT  To: Raoul Gibbs  Subject: RE:lab question/results  I have updated your chart and let Dr Jackson be aware of this. Thank you Mitzi    ----- Message -----     From: Raoul Gibbs     Sent: 5/29/2020  7:14 PM CDT       To: JEVON RIZVI  Subject: RE:lab question/results    Yes I take iron every morning  because he had told me to.  ----- Message -----  From: JEVON RIZVI  Sent: 5/29/2020  4:37 PM EDT  To: Raoul Gibbs  Subject: lab question/results  Dr Jackson is asking about your labs    Much better   Is he on iron tx?       I dont see iron on your med list? Thank you Mitzi RIZVI LPN

## 2020-06-01 NOTE — TELEPHONE ENCOUNTER
"Zantac, generic or name brand is completely taken off the system per \"recall\" and have to order something different, when I try to order zantac generic pepcid comes up  "

## 2020-06-26 ENCOUNTER — LAB (OUTPATIENT)
Dept: FAMILY MEDICINE CLINIC | Facility: CLINIC | Age: 55
End: 2020-06-26

## 2020-06-26 DIAGNOSIS — E61.1 IRON DEFICIENCY: ICD-10-CM

## 2020-06-26 DIAGNOSIS — E61.1 LOW IRON: ICD-10-CM

## 2020-06-27 LAB
BASOPHILS # BLD AUTO: 0.05 10*3/MM3 (ref 0–0.2)
BASOPHILS NFR BLD AUTO: 0.9 % (ref 0–1.5)
EOSINOPHIL # BLD AUTO: 0.21 10*3/MM3 (ref 0–0.4)
EOSINOPHIL NFR BLD AUTO: 3.7 % (ref 0.3–6.2)
ERYTHROCYTE [DISTWIDTH] IN BLOOD BY AUTOMATED COUNT: 12.2 % (ref 12.3–15.4)
HCT VFR BLD AUTO: 47.5 % (ref 37.5–51)
HGB BLD-MCNC: 16.1 G/DL (ref 13–17.7)
IMM GRANULOCYTES # BLD AUTO: 0.02 10*3/MM3 (ref 0–0.05)
IMM GRANULOCYTES NFR BLD AUTO: 0.4 % (ref 0–0.5)
IRON SERPL-MCNC: 87 MCG/DL (ref 59–158)
LYMPHOCYTES # BLD AUTO: 1.01 10*3/MM3 (ref 0.7–3.1)
LYMPHOCYTES NFR BLD AUTO: 18 % (ref 19.6–45.3)
MCH RBC QN AUTO: 30.7 PG (ref 26.6–33)
MCHC RBC AUTO-ENTMCNC: 33.9 G/DL (ref 31.5–35.7)
MCV RBC AUTO: 90.5 FL (ref 79–97)
MONOCYTES # BLD AUTO: 0.62 10*3/MM3 (ref 0.1–0.9)
MONOCYTES NFR BLD AUTO: 11.1 % (ref 5–12)
NEUTROPHILS # BLD AUTO: 3.7 10*3/MM3 (ref 1.7–7)
NEUTROPHILS NFR BLD AUTO: 65.9 % (ref 42.7–76)
NRBC BLD AUTO-RTO: 0 /100 WBC (ref 0–0.2)
PLATELET # BLD AUTO: 220 10*3/MM3 (ref 140–450)
RBC # BLD AUTO: 5.25 10*6/MM3 (ref 4.14–5.8)
WBC # BLD AUTO: 5.61 10*3/MM3 (ref 3.4–10.8)

## 2020-08-21 RX ORDER — HYDROCHLOROTHIAZIDE 25 MG/1
25 TABLET ORAL DAILY
Qty: 90 TABLET | Refills: 1 | Status: SHIPPED | OUTPATIENT
Start: 2020-08-21 | End: 2021-01-27

## 2020-11-10 DIAGNOSIS — R06.1 STRIDOR: ICD-10-CM

## 2020-11-10 DIAGNOSIS — K21.9 GASTROESOPHAGEAL REFLUX DISEASE: ICD-10-CM

## 2020-11-10 DIAGNOSIS — J38.02 BILATERAL VOCAL CORD PARESIS: ICD-10-CM

## 2020-11-10 RX ORDER — TADALAFIL 20 MG/1
20 TABLET ORAL DAILY PRN
Qty: 30 TABLET | Refills: 1 | OUTPATIENT
Start: 2020-11-10

## 2020-11-10 RX ORDER — GEMFIBROZIL 600 MG/1
600 TABLET, FILM COATED ORAL DAILY
Qty: 90 TABLET | Refills: 1 | Status: SHIPPED | OUTPATIENT
Start: 2020-11-10 | End: 2021-05-14

## 2020-11-10 RX ORDER — LOSARTAN POTASSIUM 25 MG/1
25 TABLET ORAL DAILY
Qty: 90 TABLET | Refills: 1 | Status: SHIPPED | OUTPATIENT
Start: 2020-11-10 | End: 2021-05-14

## 2020-11-10 RX ORDER — PANTOPRAZOLE SODIUM 40 MG/1
40 TABLET, DELAYED RELEASE ORAL DAILY
Qty: 90 TABLET | Refills: 1 | Status: SHIPPED | OUTPATIENT
Start: 2020-11-10 | End: 2021-03-25

## 2020-11-11 RX ORDER — TADALAFIL 20 MG/1
20 TABLET ORAL DAILY PRN
Qty: 30 TABLET | Refills: 1 | Status: SHIPPED | OUTPATIENT
Start: 2020-11-11 | End: 2022-11-21 | Stop reason: SDUPTHER

## 2020-11-11 NOTE — TELEPHONE ENCOUNTER
Confirm with pharmacy; denial was by insurance  Then explain to patient  Not sure if he will give information that it works better than what they will approve to get his preference approved OR if he would want to pay out of pocket?     ----- Message from Jennifer Aparicio MA sent at 11/11/2020  8:31 AM CST -----  Regarding: FW: Prescription Question  Contact: [phone]    ----- Message -----  From: Raoul Gibbs  Sent: 11/11/2020   7:50 AM CST  To: Magi Espinoza [facility]  Subject: Prescription Question                            Good morning!  I see where my request for refill on the (Cialis) TADALAFIL 20 mg   was denied.   I would like for you to approve this for me please. After continuing to take it  after it raven making me feel a little different, I discovered that it actually works better for me and stays in my system longer. The side affects could have been my anxiety because I no longer have them. Please approve the TADALAFIL 20 mg for me Thanks!      
Ok to refill    ----- Message from Jennifer Aparicio MA sent at 11/11/2020  9:24 AM CST -----  Regarding: FW: Prescription Question  Contact: [phone]  Dr. Jackson,    I will get this sent to the pharmacy as the patient requests.  He is ok with paying curry for this.    Jennifer SINGLETON  ----- Message -----  From: Raoul Gibbs  Sent: 11/11/2020   9:12 AM CST  To: Magi [facility]  Subject: RE: Prescription Question                        If my insurance doesn’t cover it, I don’t mind the cost if it isn’t ridiculous. I don’t take it everyday and I think the last time my cost was like $40 and don’t mind that because it works really well for me. Thanks!      
2

## 2021-01-27 DIAGNOSIS — F41.9 ANXIETY: ICD-10-CM

## 2021-01-27 DIAGNOSIS — E78.1 HYPERTRIGLYCERIDEMIA: Primary | ICD-10-CM

## 2021-01-27 RX ORDER — HYDROCHLOROTHIAZIDE 25 MG/1
25 TABLET ORAL DAILY
Qty: 90 TABLET | Refills: 1 | Status: SHIPPED | OUTPATIENT
Start: 2021-01-27 | End: 2021-08-18

## 2021-01-27 RX ORDER — BUSPIRONE HYDROCHLORIDE 15 MG/1
TABLET ORAL
Qty: 30 TABLET | Refills: 0 | Status: SHIPPED | OUTPATIENT
Start: 2021-01-27 | End: 2021-03-25

## 2021-01-27 RX ORDER — ALLOPURINOL 100 MG/1
TABLET ORAL
Qty: 60 TABLET | Refills: 5 | Status: SHIPPED | OUTPATIENT
Start: 2021-01-27 | End: 2022-02-21

## 2021-03-10 ENCOUNTER — TELEMEDICINE (OUTPATIENT)
Dept: FAMILY MEDICINE CLINIC | Facility: CLINIC | Age: 56
End: 2021-03-10

## 2021-03-10 ENCOUNTER — TELEPHONE (OUTPATIENT)
Dept: FAMILY MEDICINE CLINIC | Facility: CLINIC | Age: 56
End: 2021-03-10

## 2021-03-10 ENCOUNTER — CLINICAL SUPPORT (OUTPATIENT)
Dept: FAMILY MEDICINE CLINIC | Facility: CLINIC | Age: 56
End: 2021-03-10

## 2021-03-10 VITALS — HEART RATE: 58 BPM | OXYGEN SATURATION: 94 % | TEMPERATURE: 98.4 F

## 2021-03-10 DIAGNOSIS — R68.89 FLU-LIKE SYMPTOMS: Primary | ICD-10-CM

## 2021-03-10 DIAGNOSIS — J40 BRONCHITIS: ICD-10-CM

## 2021-03-10 LAB
EXPIRATION DATE: NORMAL
FLUAV AG NPH QL: NEGATIVE
FLUBV AG NPH QL: NEGATIVE
INTERNAL CONTROL: NORMAL
Lab: NORMAL

## 2021-03-10 PROCEDURE — 87804 INFLUENZA ASSAY W/OPTIC: CPT | Performed by: NURSE PRACTITIONER

## 2021-03-10 PROCEDURE — 99213 OFFICE O/P EST LOW 20 MIN: CPT | Performed by: NURSE PRACTITIONER

## 2021-03-10 RX ORDER — METHYLPREDNISOLONE 4 MG/1
TABLET ORAL
Qty: 1 EACH | Refills: 0 | Status: SHIPPED | OUTPATIENT
Start: 2021-03-10 | End: 2021-05-23

## 2021-03-10 RX ORDER — ALBUTEROL SULFATE 90 UG/1
2 AEROSOL, METERED RESPIRATORY (INHALATION) EVERY 4 HOURS PRN
Qty: 18 G | Refills: 5 | Status: SHIPPED | OUTPATIENT
Start: 2021-03-10 | End: 2021-07-15 | Stop reason: SDUPTHER

## 2021-03-10 RX ORDER — AMOXICILLIN AND CLAVULANATE POTASSIUM 875; 125 MG/1; MG/1
1 TABLET, FILM COATED ORAL 2 TIMES DAILY
Qty: 20 TABLET | Refills: 0 | Status: SHIPPED | OUTPATIENT
Start: 2021-03-10 | End: 2021-03-20

## 2021-03-10 NOTE — PROGRESS NOTES
Patient at drive up for Covid 19 swab, done per oropharynx as ordered, patient tolerated well, specimen secured and placed in freezer with order as directed. POCT flu swab done and resulted negative, pt was advised.

## 2021-03-10 NOTE — PROGRESS NOTES
"Subjective   Chief Complaint:  Cough not feeling well    History of Present Illness:  This 56 y.o. male was seen via telemedicine MyChart video conference today for evaluation of cough and reports not feeling well.  Reports sinuses been nasal \"plugged\".  Reports muscle aches and joint pain.    Allergies   Allergen Reactions   • Adhesive Tape Rash     VERY SENSITIVE TO ADHESIVE    • Tadalafil Other (See Comments)     \"feels bad\"      Current Outpatient Medications on File Prior to Visit   Medication Sig   • acetaminophen (TYLENOL) 500 MG tablet Take 500 mg by mouth Every 6 (Six) Hours As Needed for Mild Pain .   • albuterol (PROVENTIL) (2.5 MG/3ML) 0.083% nebulizer solution 3 mL Every 8 (Eight) Hours.   • allopurinol (ZYLOPRIM) 100 MG tablet TAKE 1 TABLET BY MOUTH TWICE DAILY   • budesonide-formoterol (SYMBICORT) 160-4.5 MCG/ACT inhaler Inhale 2 puffs 2 (Two) Times a Day.   • busPIRone (BUSPAR) 15 MG tablet TAKE 1/2 TABLET BY MOUTH TWICE DAILY   • COLCRYS 0.6 MG tablet    • gemfibrozil (LOPID) 600 MG tablet TAKE 1 TABLET BY MOUTH DAILY.   • hydroCHLOROthiazide (HYDRODIURIL) 25 MG tablet TAKE 1 TABLET BY MOUTH DAILY   • ibuprofen (ADVIL,MOTRIN) 600 MG tablet Take 600 mg by mouth Every 6 (Six) Hours As Needed for Mild Pain .   • losartan (COZAAR) 25 MG tablet TAKE 1 TABLET BY MOUTH DAILY.   • pantoprazole (PROTONIX) 40 MG EC tablet TAKE 1 TABLET BY MOUTH DAILY   • tadalafil (Cialis) 20 MG tablet Take 1 tablet by mouth Daily As Needed for Erectile Dysfunction.     No current facility-administered medications on file prior to visit.      Past Medical, Surgical, Social, and Family History:  Past Medical History:   Diagnosis Date   • Anemia    • Arthritis    • Back pain    • Charcot-Claire-Tooth disease     DX AT 19    • Claustrophobia    • COPD (chronic obstructive pulmonary disease) (CMS/[facility])    • Elevated cholesterol    • Gait abnormality     DUE TO CMT    • GERD (gastroesophageal reflux disease)    • Hyperlipidemia    • " Hypertension    • Irritable bowel    • Other diseases of vocal cords     NARROW VOICE BOX    • Sleep apnea     DOES NOT USE BIPAP OR CPAP      Past Surgical History:   Procedure Laterality Date   • BICEPS TENDONESIS SUBPECTORALIS REPAIR Right 2019    Procedure: BICEPS TENODESIS - RIGHT;  Surgeon: Yeison Agudelo MD;  Location: [facility] OR;  Service: Orthopedics   • COLONOSCOPY N/A 2020    Procedure: COLONOSCOPY WITH ANESTHESIA;  Surgeon: Cal Vela DO;  Location: [facility] ENDOSCOPY;  Service: Gastroenterology;  Laterality: N/A;  preop; blood in stool  postop; polyps   PCP anand Jackson    • ENDOSCOPY N/A 2020    Procedure: ESOPHAGOGASTRODUODENOSCOPY WITH ANESTHESIA;  Surgeon: Cal Vela DO;  Location: [facility] ENDOSCOPY;  Service: Gastroenterology;  Laterality: N/A;  preop; blood in stool  postop; normal   PCP Anand Jackson    • SHOULDER ARTHROSCOPY W/ ROTATOR CUFF REPAIR Right 2019    Procedure: RIGHT SHOULDER ARTHROSCOPIC ROTATOR CUFF REPAIR, SUBACROMIAL DECOMPRESSION;  Surgeon: Yeison Agudelo MD;  Location: [facility] OR;  Service: Orthopedics     Social History     Socioeconomic History   • Marital status: Single     Spouse name: Not on file   • Number of children: Not on file   • Years of education: 10   • Highest education level: GED or equivalent   Tobacco Use   • Smoking status: Former Smoker     Types: Cigars     Quit date: 10/2015     Years since quittin.4   • Smokeless tobacco: Former User     Quit date:    Substance and Sexual Activity   • Alcohol use: Yes     Comment: weekly   • Drug use: No   • Sexual activity: Yes     Partners: Female     Family History   Problem Relation Age of Onset   • Cancer Mother    • Diabetes Mother    • Colon polyps Mother    • Hyperlipidemia Father    • Hypertension Father    • Cancer Father    • Colon cancer Neg Hx      Review of Systems   Constitutional: Negative for chills and fever.   HENT: Positive for congestion, rhinorrhea and sinus pressure.     Respiratory: Positive for cough. Negative for shortness of breath.    Cardiovascular: Negative for chest pain and palpitations.   Musculoskeletal: Negative for arthralgias and myalgias.     Objective   Physical Exam  Constitutional:       General: He is not in acute distress.     Appearance: He is not diaphoretic.   Pulmonary:      Effort: Pulmonary effort is normal. No respiratory distress.       Assessment/Plan   Diagnoses and all orders for this visit:    1. Flu-like symptoms (Primary)  -     albuterol sulfate HFA (Ventolin HFA) 108 (90 Base) MCG/ACT inhaler; Inhale 2 puffs Every 4 (Four) Hours As Needed for Wheezing or Shortness of Air.  Dispense: 18 g; Refill: 5  -     amoxicillin-clavulanate (Augmentin) 875-125 MG per tablet; Take 1 tablet by mouth 2 (Two) Times a Day for 10 days.  Dispense: 20 tablet; Refill: 0  -     methylPREDNISolone (MEDROL) 4 MG dose pack; Take as directed on package instructions.  Dispense: 1 each; Refill: 0  -     COVID-19,[facility],NP/OP Swab in Transport Media or ESwab 72 HR TAT - Swab, Oropharynx  -     POC Influenza A / B    2. Bronchitis  -     albuterol sulfate HFA (Ventolin HFA) 108 (90 Base) MCG/ACT inhaler; Inhale 2 puffs Every 4 (Four) Hours As Needed for Wheezing or Shortness of Air.  Dispense: 18 g; Refill: 5  -     amoxicillin-clavulanate (Augmentin) 875-125 MG per tablet; Take 1 tablet by mouth 2 (Two) Times a Day for 10 days.  Dispense: 20 tablet; Refill: 0  -     methylPREDNISolone (MEDROL) 4 MG dose pack; Take as directed on package instructions.  Dispense: 1 each; Refill: 0  -     COVID-19,[facility],NP/OP Swab in Transport Media or ESwab 72 HR TAT - Swab, Oropharynx  -     POC Influenza A / B    Discussion:  Advised and educated plan of care.  Proceed with acute sinusitis treatment, refill albuterol inhaler, will get him in here for Covid test.    Follow-up:  Return if symptoms worsen or fail to improve.    Note e-Signed by RUEL Lyman on 03/10/2021 at 11:07  CST

## 2021-03-10 NOTE — TELEPHONE ENCOUNTER
"Pt called \"I want to get a covid test, I had a bad dry cough and it gets worse at bedtime, a lot of drainage, my joint\"    Set pt up with a mychart visit, but pt is having difficulty getting into the [facility]? Advised will have someone send him the doxy information and try that way and pt is agreeable    "

## 2021-03-11 LAB — SARS-COV-2 RNA RESP QL NAA+PROBE: NOT DETECTED

## 2021-03-12 NOTE — PROGRESS NOTES
Notified pt per my chart message and to let our off know if he doesn't get better or if there is anything else he needs.

## 2021-03-25 DIAGNOSIS — J38.02 BILATERAL VOCAL CORD PARESIS: ICD-10-CM

## 2021-03-25 DIAGNOSIS — E78.1 HYPERTRIGLYCERIDEMIA: ICD-10-CM

## 2021-03-25 DIAGNOSIS — R06.1 STRIDOR: ICD-10-CM

## 2021-03-25 DIAGNOSIS — K21.9 GASTROESOPHAGEAL REFLUX DISEASE: ICD-10-CM

## 2021-03-25 DIAGNOSIS — F41.9 ANXIETY: ICD-10-CM

## 2021-03-25 RX ORDER — BUSPIRONE HYDROCHLORIDE 15 MG/1
TABLET ORAL
Qty: 30 TABLET | Refills: 0 | Status: SHIPPED | OUTPATIENT
Start: 2021-03-25 | End: 2021-05-14

## 2021-03-25 RX ORDER — PANTOPRAZOLE SODIUM 40 MG/1
40 TABLET, DELAYED RELEASE ORAL DAILY
Qty: 90 TABLET | Refills: 1 | Status: SHIPPED | OUTPATIENT
Start: 2021-03-25 | End: 2021-11-22

## 2021-03-25 NOTE — TELEPHONE ENCOUNTER
Requested Prescriptions     Pending Prescriptions Disp Refills   • busPIRone (BUSPAR) 15 MG tablet [Pharmacy Med Name: BUSPIRONE HCL 15MG TABLET TEV] 30 tablet 0     Sig: TAKE 1/2 TABLET BY MOUTH TWICE DAILY   • pantoprazole (PROTONIX) 40 MG EC tablet [Pharmacy Med Name: PANTOPRAZOLE DR 40MG TAB>] 90 tablet 1     Sig: TAKE 1 TABLET BY MOUTH DAILY

## 2021-05-14 DIAGNOSIS — F41.9 ANXIETY: ICD-10-CM

## 2021-05-14 DIAGNOSIS — E78.1 HYPERTRIGLYCERIDEMIA: ICD-10-CM

## 2021-05-14 RX ORDER — GEMFIBROZIL 600 MG/1
600 TABLET, FILM COATED ORAL DAILY
Qty: 90 TABLET | Refills: 0 | Status: SHIPPED | OUTPATIENT
Start: 2021-05-14 | End: 2021-08-18

## 2021-05-14 RX ORDER — BUSPIRONE HYDROCHLORIDE 15 MG/1
TABLET ORAL
Qty: 30 TABLET | Refills: 0 | Status: SHIPPED | OUTPATIENT
Start: 2021-05-14 | End: 2021-07-15 | Stop reason: SDUPTHER

## 2021-05-14 RX ORDER — LOSARTAN POTASSIUM 25 MG/1
25 TABLET ORAL DAILY
Qty: 90 TABLET | Refills: 0 | Status: SHIPPED | OUTPATIENT
Start: 2021-05-14 | End: 2021-08-18

## 2021-05-23 PROCEDURE — U0005 INFEC AGEN DETEC AMPLI PROBE: HCPCS | Performed by: FAMILY MEDICINE

## 2021-05-23 PROCEDURE — U0004 COV-19 TEST NON-CDC HGH THRU: HCPCS | Performed by: FAMILY MEDICINE

## 2021-06-01 ENCOUNTER — TELEMEDICINE (OUTPATIENT)
Dept: FAMILY MEDICINE CLINIC | Facility: CLINIC | Age: 56
End: 2021-06-01

## 2021-06-01 ENCOUNTER — TELEPHONE (OUTPATIENT)
Dept: FAMILY MEDICINE CLINIC | Facility: CLINIC | Age: 56
End: 2021-06-01

## 2021-06-01 DIAGNOSIS — J40 BRONCHITIS: Primary | ICD-10-CM

## 2021-06-01 DIAGNOSIS — J01.90 ACUTE NON-RECURRENT SINUSITIS, UNSPECIFIED LOCATION: ICD-10-CM

## 2021-06-01 PROCEDURE — 99213 OFFICE O/P EST LOW 20 MIN: CPT | Performed by: NURSE PRACTITIONER

## 2021-06-01 RX ORDER — PREDNISONE 20 MG/1
20 TABLET ORAL DAILY
Qty: 5 TABLET | Refills: 0 | Status: SHIPPED | OUTPATIENT
Start: 2021-06-01 | End: 2021-06-06

## 2021-06-01 RX ORDER — AMOXICILLIN AND CLAVULANATE POTASSIUM 875; 125 MG/1; MG/1
1 TABLET, FILM COATED ORAL 2 TIMES DAILY
Qty: 20 TABLET | Refills: 0 | Status: SHIPPED | OUTPATIENT
Start: 2021-06-01 | End: 2021-06-11

## 2021-06-01 NOTE — TELEPHONE ENCOUNTER
Regarding: Visit Follow-Up Question  Contact: [phone]  ----- Message from Kristi Del Rosario MA sent at 6/1/2021  8:27 AM CDT -----  Please advise       ----- Message from Raoul Gibbs Randy Eugene, MD sent at 5/29/2021  2:03 PM -----   I was seen at urgent care may 23rd sinusitis/ bronchitis was prescribed a Z-Gavino and medrol dose pack have taken all and still have nasal congestion as well as chest congestion don’t know if maybe I need another round of antibiotics, a shot or what???? Was tested for Covid, it was negative but these symptoms are really getting me down. What to do? Thanks

## 2021-06-01 NOTE — PROGRESS NOTES
"Subjective   Chief Complaint:  \"This is gone to my chest\"    History of Present Illness:  This 56 y.o. male was seen via telemedicine MyChart video conference today for evaluation of cough and congestion.  Reports this started as a sinus infection which she was seen in urgent care.  Reports he did a Z-Gavino and Medrol Dosepak and symptoms got worse and went down into his chest and turned to a bronchitis.  He reports his head is still stopped up and still having symptoms.  He is out of town currently on a camping trip.  This is persisted over a week at this point.    Allergies   Allergen Reactions   • Adhesive Tape Rash     VERY SENSITIVE TO ADHESIVE    • Latex Rash and Unknown - High Severity      Current Outpatient Medications on File Prior to Visit   Medication Sig   • acetaminophen (TYLENOL) 500 MG tablet Take 500 mg by mouth Every 6 (Six) Hours As Needed for Mild Pain .   • albuterol (PROVENTIL) (2.5 MG/3ML) 0.083% nebulizer solution 3 mL Every 8 (Eight) Hours.   • albuterol sulfate HFA (Ventolin HFA) 108 (90 Base) MCG/ACT inhaler Inhale 2 puffs Every 4 (Four) Hours As Needed for Wheezing or Shortness of Air.   • allopurinol (ZYLOPRIM) 100 MG tablet TAKE 1 TABLET BY MOUTH TWICE DAILY   • azithromycin (ZITHROMAX) 250 MG tablet Take 2 tablets by mouth the first day, then 1 tablet daily for more 4 days.   • budesonide-formoterol (SYMBICORT) 160-4.5 MCG/ACT inhaler Inhale 2 puffs 2 (Two) Times a Day.   • busPIRone (BUSPAR) 15 MG tablet TAKE 1/2 TABLET BY MOUTH TWICE DAILY   • fluticasone (FLONASE) 50 MCG/ACT nasal spray 2 sprays into the nostril(s) as directed by provider Daily for 30 days.   • gemfibrozil (LOPID) 600 MG tablet TAKE 1 TABLET BY MOUTH DAILY.   • hydroCHLOROthiazide (HYDRODIURIL) 25 MG tablet TAKE 1 TABLET BY MOUTH DAILY   • ibuprofen (ADVIL,MOTRIN) 600 MG tablet Take 600 mg by mouth Every 6 (Six) Hours As Needed for Mild Pain .   • losartan (COZAAR) 25 MG tablet TAKE 1 TABLET BY MOUTH DAILY.   • " methylPREDNISolone (MEDROL) 4 MG dose pack Take as directed on package instructions.   • pantoprazole (PROTONIX) 40 MG EC tablet TAKE 1 TABLET BY MOUTH DAILY   • tadalafil (Cialis) 20 MG tablet Take 1 tablet by mouth Daily As Needed for Erectile Dysfunction.     No current facility-administered medications on file prior to visit.      Past Medical, Surgical, Social, and Family History:  Past Medical History:   Diagnosis Date   • Anemia    • Arthritis    • Back pain    • Charcot-Claire-Tooth disease     DX AT 19    • Claustrophobia    • COPD (chronic obstructive pulmonary disease) (CMS/HCC)    • Elevated cholesterol    • Gait abnormality     DUE TO CMT    • GERD (gastroesophageal reflux disease)    • Hyperlipidemia    • Hypertension    • Irritable bowel    • Other diseases of vocal cords     NARROW VOICE BOX    • Sleep apnea     DOES NOT USE BIPAP OR CPAP      Past Surgical History:   Procedure Laterality Date   • BICEPS TENDONESIS SUBPECTORALIS REPAIR Right 6/28/2019    Procedure: BICEPS TENODESIS - RIGHT;  Surgeon: Yeison Agudelo MD;  Location: [facility] OR;  Service: Orthopedics   • COLONOSCOPY N/A 2/17/2020    Procedure: COLONOSCOPY WITH ANESTHESIA;  Surgeon: Cal Vela DO;  Location: [facility] ENDOSCOPY;  Service: Gastroenterology;  Laterality: N/A;  preop; blood in stool  postop; polyps   PCP anand Jackson    • ENDOSCOPY N/A 2/25/2020    Procedure: ESOPHAGOGASTRODUODENOSCOPY WITH ANESTHESIA;  Surgeon: Cal Vela DO;  Location: [facility] ENDOSCOPY;  Service: Gastroenterology;  Laterality: N/A;  preop; blood in stool  postop; normal   PCP Anand Jackson    • SHOULDER ARTHROSCOPY W/ ROTATOR CUFF REPAIR Right 6/28/2019    Procedure: RIGHT SHOULDER ARTHROSCOPIC ROTATOR CUFF REPAIR, SUBACROMIAL DECOMPRESSION;  Surgeon: Yeison Agudelo MD;  Location: [facility] OR;  Service: Orthopedics     Social History     Socioeconomic History   • Marital status: Single     Spouse name: Not on file   • Number of children: Not on file    • Years of education: 10   • Highest education level: GED or equivalent   Tobacco Use   • Smoking status: Former Smoker     Types: Cigars     Quit date: 10/2015     Years since quittin.6   • Smokeless tobacco: Former User     Quit date:    Substance and Sexual Activity   • Alcohol use: Yes     Comment: weekly   • Drug use: No   • Sexual activity: Yes     Partners: Female     Family History   Problem Relation Age of Onset   • Cancer Mother    • Diabetes Mother    • Colon polyps Mother    • Hyperlipidemia Father    • Hypertension Father    • Cancer Father    • Colon cancer Neg Hx      Objective   Physical Exam  Constitutional:       General: He is not in acute distress.     Appearance: Normal appearance.      Comments: Mild hoarseness   Pulmonary:      Effort: Pulmonary effort is normal. No respiratory distress.   Neurological:      Mental Status: He is alert.       Assessment/Plan   Diagnoses and all orders for this visit:    1. Bronchitis (Primary)    2. Acute non-recurrent sinusitis, unspecified location    Other orders  -     amoxicillin-clavulanate (Augmentin) 875-125 MG per tablet; Take 1 tablet by mouth 2 (Two) Times a Day for 10 days.  Dispense: 20 tablet; Refill: 0  -     predniSONE (DELTASONE) 20 MG tablet; Take 1 tablet by mouth Daily for 5 days.  Dispense: 5 tablet; Refill: 0    Discussion:  Advised and educated plan of care.  Rx for stronger antibiotics and steroids sent to out-of-town pharmacy.    Follow-up:  Return if symptoms worsen or fail to improve.    Electronically signed by RUEL Lyman, 21, 10:49 AM CDT.

## 2021-07-06 ENCOUNTER — OFFICE VISIT (OUTPATIENT)
Dept: FAMILY MEDICINE CLINIC | Facility: CLINIC | Age: 56
End: 2021-07-06

## 2021-07-06 VITALS
HEIGHT: 67 IN | WEIGHT: 177 LBS | TEMPERATURE: 97.6 F | HEART RATE: 88 BPM | OXYGEN SATURATION: 96 % | DIASTOLIC BLOOD PRESSURE: 84 MMHG | RESPIRATION RATE: 18 BRPM | BODY MASS INDEX: 27.78 KG/M2 | SYSTOLIC BLOOD PRESSURE: 130 MMHG

## 2021-07-06 DIAGNOSIS — H26.9 CATARACT, UNSPECIFIED CATARACT TYPE, UNSPECIFIED LATERALITY: ICD-10-CM

## 2021-07-06 DIAGNOSIS — M25.572 ACUTE LEFT ANKLE PAIN: ICD-10-CM

## 2021-07-06 DIAGNOSIS — Z71.85 VACCINE COUNSELING: ICD-10-CM

## 2021-07-06 DIAGNOSIS — Z01.818 PREOPERATIVE CLEARANCE: ICD-10-CM

## 2021-07-06 PROBLEM — G47.33 OBSTRUCTIVE SLEEP APNEA: Status: ACTIVE | Noted: 2021-07-06

## 2021-07-06 PROCEDURE — 99213 OFFICE O/P EST LOW 20 MIN: CPT | Performed by: FAMILY MEDICINE

## 2021-07-06 NOTE — PROGRESS NOTES
Subjective   Raoul Gibbs is a 56 y.o. male presenting with chief complaint of:   Chief Complaint   Patient presents with   • Preoperative clearance   • Ankle Pain     L ankle pain after fall       History of Present Illness :  Alone.  Here for primarily clearance for upcoming cataract surgery at [facility].  See form.       Has multiple chronic problems to consider that might have a bearing on today's issues; not an interval appointment.       Chronic/acute problems reviewed today:   1. Preoperative clearance chronic having a cataract and needing it removed   2. Cataract, unspecified cataract type, unspecified laterality same   3. Vaccine counseling Chronic/ongoing need to review pro/cons for various vaccinations based on age, health issues.  Needs vaccination today for COVID 19; strongly advised and where to get     4. Acute left ankle pain: 2 weeks ago this acute issue started.  He is getting out of truck and twisted his left ankle inward.  He hobbled or crawled into the house.  Slowly he has been able to put weight on it but is staying swollen and sore.     Has an/another acute issue today: none.    The following portions of the patient's history were reviewed and updated as appropriate: allergies, current medications, past family history, past medical history, past social history, past surgical history and problem list.      Current Outpatient Medications:   •  acetaminophen (TYLENOL) 500 MG tablet, Take 500 mg by mouth Every 6 (Six) Hours As Needed for Mild Pain ., Disp: , Rfl:   •  albuterol (PROVENTIL) (2.5 MG/3ML) 0.083% nebulizer solution, 3 mL Every 8 (Eight) Hours., Disp: , Rfl:   •  albuterol sulfate HFA (Ventolin HFA) 108 (90 Base) MCG/ACT inhaler, Inhale 2 puffs Every 4 (Four) Hours As Needed for Wheezing or Shortness of Air., Disp: 18 g, Rfl: 5  •  allopurinol (ZYLOPRIM) 100 MG tablet, TAKE 1 TABLET BY MOUTH TWICE DAILY, Disp: 60 tablet, Rfl: 5  •  budesonide-formoterol (SYMBICORT) 160-4.5 MCG/ACT  inhaler, Inhale 2 puffs 2 (Two) Times a Day., Disp: 3 inhaler, Rfl: 2  •  busPIRone (BUSPAR) 15 MG tablet, TAKE 1/2 TABLET BY MOUTH TWICE DAILY, Disp: 30 tablet, Rfl: 0  •  gemfibrozil (LOPID) 600 MG tablet, TAKE 1 TABLET BY MOUTH DAILY., Disp: 90 tablet, Rfl: 0  •  hydroCHLOROthiazide (HYDRODIURIL) 25 MG tablet, TAKE 1 TABLET BY MOUTH DAILY, Disp: 90 tablet, Rfl: 1  •  ibuprofen (ADVIL,MOTRIN) 600 MG tablet, Take 600 mg by mouth Every 6 (Six) Hours As Needed for Mild Pain ., Disp: , Rfl:   •  losartan (COZAAR) 25 MG tablet, TAKE 1 TABLET BY MOUTH DAILY., Disp: 90 tablet, Rfl: 0  •  pantoprazole (PROTONIX) 40 MG EC tablet, TAKE 1 TABLET BY MOUTH DAILY, Disp: 90 tablet, Rfl: 1  •  tadalafil (Cialis) 20 MG tablet, Take 1 tablet by mouth Daily As Needed for Erectile Dysfunction., Disp: 30 tablet, Rfl: 1  •  fluticasone (FLONASE) 50 MCG/ACT nasal spray, 2 sprays into the nostril(s) as directed by provider Daily for 30 days., Disp: 16 g, Rfl: 0    No problems with medications.    Allergies   Allergen Reactions   • Adhesive Tape Rash     VERY SENSITIVE TO ADHESIVE    • Latex Rash and Unknown - High Severity       Review of Systems  GENERAL:  Inactive/slower with limits, speed, stamina for age and UE/LE weakness. Sleep is ok; apnea denied. No fevers.  SKIN: No rash/skin lesion of concern.   ENDO:  No syncope, near or diaphoretic sweaty spells.  BS never an issue.  HEENT: No recent head injury; or headache.  No vision change.  No significant hearing loss.  Ears without pain/drainage.  No sore throat.  No significant nasal/sinus congestion/drainage. No epistaxis.  CHEST: No chest wall tenderness or mass.  No significant cough, with occ wheeze.  No SOB; no hemoptysis.  CV: No exertional chest pain, palpitations, ankle edema.  GI: No dysphagia or heartburn.  No abdominal pain, diarrhea, constipation.  No rectal bleeding, or melena.  No colonoscopy.   :  Voids without dysuria, or incontinence to completion.  ORTHO: No  painful/swollen joints but various on /off sore.  Same daily sore neck or back.  No acute neck or back pain without recent injury.  Inversion injury L ankle/above.   NEURO: No focal/significant weakness of extremities.  No dizziness.   Occ UE/LE numbness/paresthesias.   PSYCH: No memory loss.  Mood good; not that anxious, depressed but/and not suicidal.  Tries to tolerate stress .   Screening:  Mammogram: NA  Bone density: NA  Low dose CT chest: Tobacco-smoker/age 17/1ppd/dc 2015 (35):   GI: offered and cologuard information  Prostate: none  Usual lab order  12m CBC, CMP, LIPID, TSH, fT4, Vit D, B12, folate, iron, PSAs, sed rate, CR-protein    Data reviewed:   Lab Results:  Results for orders placed or performed during the hospital encounter of 05/23/21   COVID-19,APTIMA PANTHER,PAD IN-HOUSE,NP/OP/NASAL SWAB IN UTM/VTM/SALINE/LIQUID AMIES TRANSPORT MEDIA/NP WASH OR ASPIRATE, 24 HR TAT - Swab, Nasal Cavity    Specimen: Nasal Cavity; Swab   Result Value Ref Range    COVID19 Not Detected Not Detected - Ref. Range       A1C:No results for input(s): HGBA1C in the last 70611 hours.  LIPID:  Lab Results - Last 18 Months   Lab Units 01/21/20  0719   CHOLESTEROL mg/dL 183   LDL CHOL mg/dL 113*   HDL CHOL mg/dL 40   TRIGLYCERIDES mg/dL 150     PSA:  Lab Results - Last 18 Months   Lab Units 01/21/20  0719   PSA ng/mL 0.657     CBC:  Lab Results - Last 18 Months   Lab Units 06/26/20  0720 05/28/20  0728 01/21/20  0719   WBC 10*3/mm3 5.61 6.19 10.68   HEMOGLOBIN g/dL 16.1 16.6 15.3   HEMATOCRIT % 47.5 47.7 44.9   PLATELETS 10*3/mm3 220 251 292   IRON mcg/dL 87 101 44*      BMP/CMP:  Lab Results - Last 18 Months   Lab Units 01/21/20  0719   SODIUM mmol/L 139   POTASSIUM mmol/L 4.3   CHLORIDE mmol/L 97*   TOTAL CO2 mmol/L 31.0*   GLUCOSE mg/dL 92   BUN mg/dL 19   CREATININE mg/dL 1.04   EGFR IF NONAFRICN AM mL/min/1.73 75   EGFR IF AFRICN AM mL/min/1.73 91   CALCIUM mg/dL 9.4     HEPATIC:  Lab Results - Last 18 Months   Lab Units  "01/21/20  0719   ALT (SGPT) U/L 22   AST (SGOT) U/L 17   ALK PHOS U/L 44     THYROID:  Lab Results - Last 18 Months   Lab Units 01/21/20  0719   TSH uIU/mL 1.010   FREE T4 ng/dL 1.31       Objective   /84   Pulse 88   Temp 97.6 °F (36.4 °C) (Infrared)   Resp 18   Ht 170.2 cm (67\")   Wt 80.3 kg (177 lb)   SpO2 96%   BMI 27.72 kg/m²   Body mass index is 27.72 kg/m².    Recent Vitals       3/10/2021 5/23/2021 7/6/2021       BP:  --  149/84  130/84     Pulse:  58  97  88     Temp:  98.4 °F (36.9 °C)  97.3 °F (36.3 °C)  97.6 °F (36.4 °C)     Weight:  --  81.6 kg (180 lb)  80.3 kg (177 lb)     BMI (Calculated):  --  --  27.7           Physical Exam  GENERAL:  Well nourished/developed in no acute distress.   SKIN: Turgor excellent, without wound, rash, lesion  HEENT: Normal cephalic without trauma.  Pupils equal round reactive to light. Extraocular motions full without nystagmus.   External canals nonobstructive nontender without reddness. Tymphatic membranes bernard with yazan structures intact.   Oral cavity without growths, exudates, and moist.  Posterior pharynx without mass, obstruction, redness.  No thyromegaly, mass, tenderness, lymphadenopathy and supple.  CV: Regular rhythm.  No murmur, gallop,  edema. Posterior pulses intact.  No carotid bruits.  CHEST: No chest wall tenderness or mass.   LUNGS: Symmetric motion with clear/decreased to auscultation.  No dullness to percussion  ABD: Soft, nontender without mass.   ORTHO: Symmetric extremities without swelling/point tenderness except L ankle swollen; sore anterior/lateral ankle to touch/movement which is full.   Other full gross range of motion.  Walking without assistive device.   NEURO: CN 2-12 grossly intact.  Symmetric facies and UE/LE.  1-2/5 strength throughout. 1/4 x bicep equal reflexes. Marked hand/foot muscle atrophy.  Nonfocal use extremities. Speech clear.  Reduced light touch with monofilament, vibratory sensation with tuning fork; equal " toes/distal feet.    PSYCH: Oriented x 3.  Pleasant calm, well kept.  Purposeful/directed conservation with intact short/long gross memory.     Assessment/Plan     1. Preoperative clearance    2. Cataract, unspecified cataract type, unspecified laterality    3. Vaccine counseling    4. Acute left ankle pain      Discussion:  Acceptable as his exam is today for his cataract surgery.  Report any changes as this is an elective procedure and any change in his health would have to be reconsidered  I strongly advised him to get the Covid vaccine started now  Plain films of left ankle and will go from there; may need MRI    Medical decision issues:   Data review above:   Rx: reviewed and decisions:   Same Rx for now     Orders placed:   LAB/Testing/Referrals: reviewed/orders:   Today:   Orders Placed This Encounter   Procedures   • XR Ankle 3+ View Left     Chronic/recurrent labs above or change to:   same     Health maintenance:   Body mass index is 27.72 kg/m².  Patient's Body mass index is 27.72 kg/m². indicating that he is within normal range (BMI 18.5-24.9). No BMI management plan needed..    Tobacco use reviewed:   Raoul Bernie  reports that he quit smoking about 5 years ago. His smoking use included cigars. He quit smokeless tobacco use about 21 years ago..      Patient Instructions   Report any changes in health that might affect today's decision for you to have your procedure.     ########################        Follow up: No follow-ups on file.  No future appointments.

## 2021-07-06 NOTE — PATIENT INSTRUCTIONS
Report any changes in health that might affect today's decision for you to have your procedure.     ########################

## 2021-07-08 ENCOUNTER — TELEPHONE (OUTPATIENT)
Dept: FAMILY MEDICINE CLINIC | Facility: CLINIC | Age: 56
End: 2021-07-08

## 2021-07-08 DIAGNOSIS — Z01.818 PREOPERATIVE CLEARANCE: Primary | ICD-10-CM

## 2021-07-08 NOTE — TELEPHONE ENCOUNTER
I FAXED THE PRE-OP COVID ORDER TO [facility]. THE PT IS AWARE. HE IS GOING TO HAVE THIS PREFORMED TOMORROW.

## 2021-07-08 NOTE — TELEPHONE ENCOUNTER
THE PT IS HAVING SURGERY @ [facility] ON 7/12/21. DHIRAJ TOLD HIM TO LET US KNOW IF WE NEED TO ORDER THIS. HE IS WANTING THIS PREFORMED @ [facility] TOMORROW, 7/9/21. WE WILL NEED AN ORDER. LET ME KNOW WHEN THIS IS COMPLETE SO I CAN FAX THE ORDER OVER TO [facility] AND CALL AND LET THE PT KNOW.

## 2021-07-15 DIAGNOSIS — E78.1 HYPERTRIGLYCERIDEMIA: ICD-10-CM

## 2021-07-15 DIAGNOSIS — F41.9 ANXIETY: ICD-10-CM

## 2021-07-15 DIAGNOSIS — J40 BRONCHITIS: ICD-10-CM

## 2021-07-15 DIAGNOSIS — R68.89 FLU-LIKE SYMPTOMS: ICD-10-CM

## 2021-07-15 RX ORDER — ALBUTEROL SULFATE 90 UG/1
2 AEROSOL, METERED RESPIRATORY (INHALATION) EVERY 4 HOURS PRN
Qty: 18 G | Refills: 5 | Status: SHIPPED | OUTPATIENT
Start: 2021-07-15 | End: 2022-08-29

## 2021-07-15 RX ORDER — BUSPIRONE HYDROCHLORIDE 15 MG/1
7.5 TABLET ORAL 2 TIMES DAILY
Qty: 30 TABLET | Refills: 3 | Status: SHIPPED | OUTPATIENT
Start: 2021-07-15 | End: 2021-07-28

## 2021-07-15 NOTE — TELEPHONE ENCOUNTER
Caller: GibbsaRoul    Relationship: Self    Best call back number: [phone]      Medication needed:   Requested Prescriptions     Pending Prescriptions Disp Refills   • albuterol sulfate HFA (Ventolin HFA) 108 (90 Base) MCG/ACT inhaler 18 g 5     Sig: Inhale 2 puffs Every 4 (Four) Hours As Needed for Wheezing or Shortness of Air.   • busPIRone (BUSPAR) 15 MG tablet 30 tablet 0     Sig: Take 0.5 tablets by mouth 2 (Two) Times a Day.       When do you need the refill by: ASAP     What additional details did the patient provide when requesting the medication: COMPLETELY OUT - IS ALSO GOING OUT OF TOWN AND NEEDS THEM BY SATURDAY     Does the patient have less than a 3 day supply:  [x] Yes  [] No    What is the patient's preferred pharmacy: [facility] / [place] [id number] [facility] [phone] [facility] [phone] FX

## 2021-07-18 ENCOUNTER — PATIENT MESSAGE (OUTPATIENT)
Dept: FAMILY MEDICINE CLINIC | Facility: CLINIC | Age: 56
End: 2021-07-18

## 2021-07-19 ENCOUNTER — TELEPHONE (OUTPATIENT)
Dept: FAMILY MEDICINE CLINIC | Facility: CLINIC | Age: 56
End: 2021-07-19

## 2021-07-19 NOTE — TELEPHONE ENCOUNTER
Regarding: Non-Urgent Medical Question  Contact: [phone]  ----- Message from Mitzi Floyd LPN sent at 7/19/2021  9:20 AM CDT -----  Appt?     ----- Message from Raoul Gibbs Randy Eugene, MD sent at 7/18/2021  6:44 PM -----   I seem to be having some trouble with mild depression after recently breaking up with my girlfriend of five year as I have trouble with separation anxiety. Just feel like I need something to help with the sadness and feelings of just wanting to lay around and isolate.  I took an anti depressant back a few years ago and ask if there’s a possibility of getting a script  for awhile just so I can have a little help to move on. Also I don’t know what’s out there that is compatible with my other meds that won’t interfere with a man having an orgasm but I do remember that after taking the last one that I had trouble doing so, almost impossible. Thanks for any and all consideration.

## 2021-07-20 ENCOUNTER — OFFICE VISIT (OUTPATIENT)
Dept: FAMILY MEDICINE CLINIC | Facility: CLINIC | Age: 56
End: 2021-07-20

## 2021-07-20 VITALS
TEMPERATURE: 97.5 F | RESPIRATION RATE: 16 BRPM | SYSTOLIC BLOOD PRESSURE: 128 MMHG | HEIGHT: 67 IN | HEART RATE: 82 BPM | BODY MASS INDEX: 27.94 KG/M2 | DIASTOLIC BLOOD PRESSURE: 80 MMHG | OXYGEN SATURATION: 98 % | WEIGHT: 178 LBS

## 2021-07-20 DIAGNOSIS — Z00.00 WELLNESS EXAMINATION: ICD-10-CM

## 2021-07-20 DIAGNOSIS — F32.9 REACTIVE DEPRESSION: Primary | ICD-10-CM

## 2021-07-20 DIAGNOSIS — I10 ESSENTIAL HYPERTENSION: ICD-10-CM

## 2021-07-20 PROCEDURE — 99213 OFFICE O/P EST LOW 20 MIN: CPT | Performed by: NURSE PRACTITIONER

## 2021-07-20 RX ORDER — PREDNISOLONE ACETATE 10 MG/ML
SUSPENSION/ DROPS OPHTHALMIC
COMMUNITY
Start: 2021-07-09 | End: 2022-01-03

## 2021-07-20 RX ORDER — OFLOXACIN 3 MG/ML
SOLUTION/ DROPS OPHTHALMIC
COMMUNITY
Start: 2021-07-09 | End: 2022-01-03

## 2021-07-20 RX ORDER — KETOROLAC TROMETHAMINE 5 MG/ML
SOLUTION OPHTHALMIC
COMMUNITY
Start: 2021-07-09 | End: 2022-01-03

## 2021-07-20 RX ORDER — BUPROPION HYDROCHLORIDE 150 MG/1
150 TABLET ORAL DAILY
Qty: 30 TABLET | Refills: 5 | Status: SHIPPED | OUTPATIENT
Start: 2021-07-20 | End: 2022-02-21

## 2021-07-20 NOTE — PROGRESS NOTES
Subjective   Chief Complaint:  Discussed depression,    History of Present Illness:  This 56 y.o. male was seen in the office today.  He reports increased depression.  Reports he had a break-up in December, the significant other got back with him a month ago and is now breaking up again.  He reports his emotions have been up and down ever since all of this.    Allergies   Allergen Reactions   • Adhesive Tape Rash     VERY SENSITIVE TO ADHESIVE    • Latex Rash and Unknown - High Severity      Current Outpatient Medications on File Prior to Visit   Medication Sig   • acetaminophen (TYLENOL) 500 MG tablet Take 500 mg by mouth Every 6 (Six) Hours As Needed for Mild Pain .   • albuterol (PROVENTIL) (2.5 MG/3ML) 0.083% nebulizer solution 3 mL Every 8 (Eight) Hours.   • albuterol sulfate HFA (Ventolin HFA) 108 (90 Base) MCG/ACT inhaler Inhale 2 puffs Every 4 (Four) Hours As Needed for Wheezing or Shortness of Air.   • allopurinol (ZYLOPRIM) 100 MG tablet TAKE 1 TABLET BY MOUTH TWICE DAILY   • budesonide-formoterol (SYMBICORT) 160-4.5 MCG/ACT inhaler Inhale 2 puffs 2 (Two) Times a Day.   • busPIRone (BUSPAR) 15 MG tablet Take 0.5 tablets by mouth 2 (Two) Times a Day.   • gemfibrozil (LOPID) 600 MG tablet TAKE 1 TABLET BY MOUTH DAILY.   • hydroCHLOROthiazide (HYDRODIURIL) 25 MG tablet TAKE 1 TABLET BY MOUTH DAILY   • ibuprofen (ADVIL,MOTRIN) 600 MG tablet Take 600 mg by mouth Every 6 (Six) Hours As Needed for Mild Pain .   • ketorolac (ACULAR) 0.5 % ophthalmic solution    • losartan (COZAAR) 25 MG tablet TAKE 1 TABLET BY MOUTH DAILY.   • ofloxacin (OCUFLOX) 0.3 % ophthalmic solution    • pantoprazole (PROTONIX) 40 MG EC tablet TAKE 1 TABLET BY MOUTH DAILY   • prednisoLONE acetate (PRED FORTE) 1 % ophthalmic suspension    • tadalafil (Cialis) 20 MG tablet Take 1 tablet by mouth Daily As Needed for Erectile Dysfunction.   • fluticasone (FLONASE) 50 MCG/ACT nasal spray 2 sprays into the nostril(s) as directed by provider Daily  for 30 days.     No current facility-administered medications on file prior to visit.      Past Medical, Surgical, Social, and Family History:  Past Medical History:   Diagnosis Date   • Anemia    • Arthritis    • Back pain    • Charcot-Claire-Tooth disease     DX AT 19    • Claustrophobia    • COPD (chronic obstructive pulmonary disease) (CMS/HCC)    • Elevated cholesterol    • Gait abnormality     DUE TO CMT    • GERD (gastroesophageal reflux disease)    • Hyperlipidemia    • Hypertension    • Irritable bowel    • Other diseases of vocal cords     NARROW VOICE BOX    • Sleep apnea     DOES NOT USE BIPAP OR CPAP      Past Surgical History:   Procedure Laterality Date   • BICEPS TENDONESIS SUBPECTORALIS REPAIR Right 2019    Procedure: BICEPS TENODESIS - RIGHT;  Surgeon: Yeison Agudelo MD;  Location: [facility] OR;  Service: Orthopedics   • COLONOSCOPY N/A 2020    Procedure: COLONOSCOPY WITH ANESTHESIA;  Surgeon: Cal Vela DO;  Location: [facility] ENDOSCOPY;  Service: Gastroenterology;  Laterality: N/A;  preop; blood in stool  postop; polyps   PCP anand Jackson    • ENDOSCOPY N/A 2020    Procedure: ESOPHAGOGASTRODUODENOSCOPY WITH ANESTHESIA;  Surgeon: Cal Vela DO;  Location: [facility] ENDOSCOPY;  Service: Gastroenterology;  Laterality: N/A;  preop; blood in stool  postop; normal   PCP Anand Jackson    • SHOULDER ARTHROSCOPY W/ ROTATOR CUFF REPAIR Right 2019    Procedure: RIGHT SHOULDER ARTHROSCOPIC ROTATOR CUFF REPAIR, SUBACROMIAL DECOMPRESSION;  Surgeon: Yeison Agudelo MD;  Location: [facility] OR;  Service: Orthopedics     Social History     Socioeconomic History   • Marital status: Single     Spouse name: Not on file   • Number of children: Not on file   • Years of education: 10   • Highest education level: GED or equivalent   Tobacco Use   • Smoking status: Former Smoker     Types: Cigars     Quit date: 10/2015     Years since quittin.8   • Smokeless tobacco: Former User     Quit date:   "  Substance and Sexual Activity   • Alcohol use: Yes     Comment: weekly   • Drug use: No   • Sexual activity: Yes     Partners: Female     Family History   Problem Relation Age of Onset   • Cancer Mother    • Diabetes Mother    • Colon polyps Mother    • Hyperlipidemia Father    • Hypertension Father    • Cancer Father    • Colon cancer Neg Hx      Objective   Physical Exam  Constitutional:       General: He is not in acute distress.  Cardiovascular:      Rate and Rhythm: Normal rate and regular rhythm.   Pulmonary:      Effort: Pulmonary effort is normal.      Breath sounds: Normal breath sounds.   Neurological:      Mental Status: He is alert.   Psychiatric:      Comments: Behavior appropriate-openly voices feelings about current situation.  Denies desire to harm self or others.     /80 (BP Location: Left arm)   Pulse 82   Temp 97.5 °F (36.4 °C)   Resp 16   Ht 170.2 cm (67\")   Wt 80.7 kg (178 lb)   SpO2 98%   BMI 27.88 kg/m²     Assessment/Plan   Diagnoses and all orders for this visit:    1. Reactive depression (Primary)  -     buPROPion XL (Wellbutrin XL) 150 MG 24 hr tablet; Take 1 tablet by mouth Daily.  Dispense: 30 tablet; Refill: 5  -     CBC & Differential  -     Comprehensive Metabolic Panel  -     Lipid Panel  -     TSH    2. Wellness examination  -     Cancel: CBC & Differential  -     Cancel: Comprehensive Metabolic Panel  -     Cancel: Lipid Panel  -     Cancel: TSH  -     CBC & Differential  -     Comprehensive Metabolic Panel  -     Lipid Panel  -     TSH    3. Essential hypertension  -     CBC & Differential  -     Comprehensive Metabolic Panel  -     Lipid Panel  -     TSH    Discussion:  Advised and educated plan of care.  He is agreeable to regular labs.  We discussed different options, he is afraid of the sexual dysfunction side effects of SSRIs.  He is agreeable to try Wellbutrin and follow-up in 4 weeks.  Advised side effect profile.    Follow-up:  Return for 4 Weeks - Moisés " RUEL Yang.    Electronically signed by RUEL Lyman, 07/20/21, 10:49 AM CDT.

## 2021-07-21 LAB
ALBUMIN SERPL-MCNC: 4.8 G/DL (ref 3.5–5.2)
ALBUMIN/GLOB SERPL: 2.1 G/DL
ALP SERPL-CCNC: 58 U/L (ref 39–117)
ALT SERPL-CCNC: 16 U/L (ref 1–41)
AST SERPL-CCNC: 15 U/L (ref 1–40)
BASOPHILS # BLD AUTO: 0.08 10*3/MM3 (ref 0–0.2)
BASOPHILS NFR BLD AUTO: 1.4 % (ref 0–1.5)
BILIRUB SERPL-MCNC: 0.7 MG/DL (ref 0–1.2)
BUN SERPL-MCNC: 16 MG/DL (ref 6–20)
BUN/CREAT SERPL: 18.2 (ref 7–25)
CALCIUM SERPL-MCNC: 9.6 MG/DL (ref 8.6–10.5)
CHLORIDE SERPL-SCNC: 96 MMOL/L (ref 98–107)
CHOLEST SERPL-MCNC: 174 MG/DL (ref 0–200)
CO2 SERPL-SCNC: 30.3 MMOL/L (ref 22–29)
CREAT SERPL-MCNC: 0.88 MG/DL (ref 0.76–1.27)
EOSINOPHIL # BLD AUTO: 0.38 10*3/MM3 (ref 0–0.4)
EOSINOPHIL NFR BLD AUTO: 6.5 % (ref 0.3–6.2)
ERYTHROCYTE [DISTWIDTH] IN BLOOD BY AUTOMATED COUNT: 12.6 % (ref 12.3–15.4)
GLOBULIN SER CALC-MCNC: 2.3 GM/DL
GLUCOSE SERPL-MCNC: 80 MG/DL (ref 65–99)
HCT VFR BLD AUTO: 48.3 % (ref 37.5–51)
HDLC SERPL-MCNC: 35 MG/DL (ref 40–60)
HGB BLD-MCNC: 16.5 G/DL (ref 13–17.7)
IMM GRANULOCYTES # BLD AUTO: 0.02 10*3/MM3 (ref 0–0.05)
IMM GRANULOCYTES NFR BLD AUTO: 0.3 % (ref 0–0.5)
LDLC SERPL CALC-MCNC: 100 MG/DL (ref 0–100)
LYMPHOCYTES # BLD AUTO: 1.18 10*3/MM3 (ref 0.7–3.1)
LYMPHOCYTES NFR BLD AUTO: 20.3 % (ref 19.6–45.3)
MCH RBC QN AUTO: 32.2 PG (ref 26.6–33)
MCHC RBC AUTO-ENTMCNC: 34.2 G/DL (ref 31.5–35.7)
MCV RBC AUTO: 94.3 FL (ref 79–97)
MONOCYTES # BLD AUTO: 0.76 10*3/MM3 (ref 0.1–0.9)
MONOCYTES NFR BLD AUTO: 13.1 % (ref 5–12)
NEUTROPHILS # BLD AUTO: 3.4 10*3/MM3 (ref 1.7–7)
NEUTROPHILS NFR BLD AUTO: 58.4 % (ref 42.7–76)
NRBC BLD AUTO-RTO: 0 /100 WBC (ref 0–0.2)
PLATELET # BLD AUTO: 239 10*3/MM3 (ref 140–450)
POTASSIUM SERPL-SCNC: 4.1 MMOL/L (ref 3.5–5.2)
PROT SERPL-MCNC: 7.1 G/DL (ref 6–8.5)
RBC # BLD AUTO: 5.12 10*6/MM3 (ref 4.14–5.8)
SODIUM SERPL-SCNC: 138 MMOL/L (ref 136–145)
TRIGL SERPL-MCNC: 230 MG/DL (ref 0–150)
TSH SERPL DL<=0.005 MIU/L-ACNC: 0.98 UIU/ML (ref 0.27–4.2)
VLDLC SERPL CALC-MCNC: 39 MG/DL (ref 5–40)
WBC # BLD AUTO: 5.82 10*3/MM3 (ref 3.4–10.8)

## 2021-07-21 NOTE — PROGRESS NOTES
Please call the patient - Needs to work on trigs - should be able to get down with diet/exercise.  If already eating low carb/fat and exercising at least 30 min 3-5 x/week, we can talk more during next visit about medicine but try this first.    Electronically signed by RUEL Lyman, 07/21/21, 6:24 AM CDT.

## 2021-07-28 DIAGNOSIS — E78.1 HYPERTRIGLYCERIDEMIA: ICD-10-CM

## 2021-07-28 DIAGNOSIS — F41.9 ANXIETY: ICD-10-CM

## 2021-07-28 RX ORDER — BUSPIRONE HYDROCHLORIDE 15 MG/1
TABLET ORAL
Qty: 30 TABLET | Refills: 0 | Status: SHIPPED | OUTPATIENT
Start: 2021-07-28 | End: 2021-11-03

## 2021-08-18 DIAGNOSIS — F41.9 ANXIETY: ICD-10-CM

## 2021-08-18 DIAGNOSIS — E78.1 HYPERTRIGLYCERIDEMIA: ICD-10-CM

## 2021-08-18 RX ORDER — LOSARTAN POTASSIUM 25 MG/1
TABLET ORAL
Qty: 90 TABLET | Refills: 2 | Status: SHIPPED | OUTPATIENT
Start: 2021-08-18 | End: 2022-05-03

## 2021-08-18 RX ORDER — GEMFIBROZIL 600 MG/1
TABLET, FILM COATED ORAL
Qty: 90 TABLET | Refills: 2 | Status: SHIPPED | OUTPATIENT
Start: 2021-08-18 | End: 2022-05-03

## 2021-08-18 RX ORDER — HYDROCHLOROTHIAZIDE 25 MG/1
TABLET ORAL
Qty: 90 TABLET | Refills: 1 | Status: SHIPPED | OUTPATIENT
Start: 2021-08-18 | End: 2022-02-21

## 2021-08-30 ENCOUNTER — OFFICE VISIT (OUTPATIENT)
Dept: FAMILY MEDICINE CLINIC | Facility: CLINIC | Age: 56
End: 2021-08-30

## 2021-08-30 VITALS
SYSTOLIC BLOOD PRESSURE: 122 MMHG | HEART RATE: 90 BPM | OXYGEN SATURATION: 98 % | HEIGHT: 67 IN | WEIGHT: 171.4 LBS | TEMPERATURE: 98.3 F | RESPIRATION RATE: 18 BRPM | DIASTOLIC BLOOD PRESSURE: 88 MMHG | BODY MASS INDEX: 26.9 KG/M2

## 2021-08-30 DIAGNOSIS — F41.9 ANXIETY: Primary | ICD-10-CM

## 2021-08-30 PROCEDURE — 99213 OFFICE O/P EST LOW 20 MIN: CPT | Performed by: NURSE PRACTITIONER

## 2021-08-30 RX ORDER — ALPRAZOLAM 0.5 MG/1
0.5 TABLET ORAL 2 TIMES DAILY PRN
Qty: 30 TABLET | Refills: 0 | Status: SHIPPED | OUTPATIENT
Start: 2021-08-30 | End: 2022-02-03 | Stop reason: SDUPTHER

## 2021-08-30 NOTE — PROGRESS NOTES
Subjective   Chief Complaint:  Increased anxiety    History of Present Illness:  This 56 y.o. male was seen in the office today.  He reports increased anxiety about his health.  He reports he worries constantly after having his recent eye surgery.  He reports constant worry at night worrying that he could be blind in that eye.  He reports he has used Xanax in the past with good effectiveness.    Allergies   Allergen Reactions   • Adhesive Tape Rash     VERY SENSITIVE TO ADHESIVE    • Latex Rash and Unknown - High Severity      Current Outpatient Medications on File Prior to Visit   Medication Sig   • acetaminophen (TYLENOL) 500 MG tablet Take 500 mg by mouth Every 6 (Six) Hours As Needed for Mild Pain .   • albuterol sulfate HFA (Ventolin HFA) 108 (90 Base) MCG/ACT inhaler Inhale 2 puffs Every 4 (Four) Hours As Needed for Wheezing or Shortness of Air.   • allopurinol (ZYLOPRIM) 100 MG tablet TAKE 1 TABLET BY MOUTH TWICE DAILY   • buPROPion XL (Wellbutrin XL) 150 MG 24 hr tablet Take 1 tablet by mouth Daily.   • busPIRone (BUSPAR) 15 MG tablet TAKE 1/2 TABLET BY MOUTH TWICE DAILY    • gemfibrozil (LOPID) 600 MG tablet TAKE ONE (1) TABLET BY MOUTH DAILY. NEEDS OFFICE APPT.    • hydroCHLOROthiazide (HYDRODIURIL) 25 MG tablet TAKE ONE (1) TABLET BY MOUTH DAILY    • losartan (COZAAR) 25 MG tablet TAKE ONE (1) TABLET BY MOUTH DAILY.    • ofloxacin (OCUFLOX) 0.3 % ophthalmic solution    • pantoprazole (PROTONIX) 40 MG EC tablet TAKE 1 TABLET BY MOUTH DAILY   • prednisoLONE acetate (PRED FORTE) 1 % ophthalmic suspension    • tadalafil (Cialis) 20 MG tablet Take 1 tablet by mouth Daily As Needed for Erectile Dysfunction.   • albuterol (PROVENTIL) (2.5 MG/3ML) 0.083% nebulizer solution 3 mL Every 8 (Eight) Hours.   • budesonide-formoterol (SYMBICORT) 160-4.5 MCG/ACT inhaler Inhale 2 puffs 2 (Two) Times a Day.   • fluticasone (FLONASE) 50 MCG/ACT nasal spray 2 sprays into the nostril(s) as directed by provider Daily for 30  days.   • ibuprofen (ADVIL,MOTRIN) 600 MG tablet Take 600 mg by mouth Every 6 (Six) Hours As Needed for Mild Pain .   • ketorolac (ACULAR) 0.5 % ophthalmic solution      No current facility-administered medications on file prior to visit.      Past Medical, Surgical, Social, and Family History:  Past Medical History:   Diagnosis Date   • Anemia    • Arthritis    • Back pain    • Charcot-Claire-Tooth disease     DX AT 19    • Claustrophobia    • COPD (chronic obstructive pulmonary disease) (CMS/HCC)    • Elevated cholesterol    • Gait abnormality     DUE TO CMT    • GERD (gastroesophageal reflux disease)    • Hyperlipidemia    • Hypertension    • Irritable bowel    • Other diseases of vocal cords     NARROW VOICE BOX    • Sleep apnea     DOES NOT USE BIPAP OR CPAP      Past Surgical History:   Procedure Laterality Date   • BICEPS TENDONESIS SUBPECTORALIS REPAIR Right 6/28/2019    Procedure: BICEPS TENODESIS - RIGHT;  Surgeon: Yeison Agudelo MD;  Location: [facility] OR;  Service: Orthopedics   • COLONOSCOPY N/A 2/17/2020    Procedure: COLONOSCOPY WITH ANESTHESIA;  Surgeon: Cal Vela DO;  Location: [facility] ENDOSCOPY;  Service: Gastroenterology;  Laterality: N/A;  preop; blood in stool  postop; polyps   PCP anand Jackson    • ENDOSCOPY N/A 2/25/2020    Procedure: ESOPHAGOGASTRODUODENOSCOPY WITH ANESTHESIA;  Surgeon: Cal Vela DO;  Location: [facility] ENDOSCOPY;  Service: Gastroenterology;  Laterality: N/A;  preop; blood in stool  postop; normal   PCP Anand Jackson    • SHOULDER ARTHROSCOPY W/ ROTATOR CUFF REPAIR Right 6/28/2019    Procedure: RIGHT SHOULDER ARTHROSCOPIC ROTATOR CUFF REPAIR, SUBACROMIAL DECOMPRESSION;  Surgeon: Yeison Agudelo MD;  Location: [facility] OR;  Service: Orthopedics     Social History     Socioeconomic History   • Marital status: Single     Spouse name: Not on file   • Number of children: Not on file   • Years of education: 10   • Highest education level: GED or equivalent   Tobacco Use   •  "Smoking status: Former Smoker     Types: Cigars     Quit date: 10/2015     Years since quittin.9   • Smokeless tobacco: Former User     Quit date:    Substance and Sexual Activity   • Alcohol use: Yes     Comment: weekly   • Drug use: No   • Sexual activity: Yes     Partners: Female     Family History   Problem Relation Age of Onset   • Cancer Mother    • Diabetes Mother    • Colon polyps Mother    • Hyperlipidemia Father    • Hypertension Father    • Cancer Father    • Colon cancer Neg Hx      Objective   Physical Exam  Constitutional:       General: He is not in acute distress.  Cardiovascular:      Rate and Rhythm: Normal rate and regular rhythm.   Pulmonary:      Effort: Pulmonary effort is normal.      Breath sounds: Normal breath sounds.   Neurological:      Mental Status: He is alert.   Psychiatric:      Comments: Calm and cooperative, openly voices feelings.     /88 (BP Location: Left arm, Patient Position: Sitting, Cuff Size: Adult)   Pulse 90   Temp 98.3 °F (36.8 °C) (Infrared)   Resp 18   Ht 170.2 cm (67\")   Wt 77.7 kg (171 lb 6.4 oz)   SpO2 98%   BMI 26.85 kg/m²     Assessment/Plan   Diagnoses and all orders for this visit:    1. Anxiety (Primary)  -     ALPRAZolam (Xanax) 0.5 MG tablet; Take 1 tablet by mouth 2 (Two) Times a Day As Needed for Anxiety.  Dispense: 30 tablet; Refill: 0    Discussion:  Advised and educated plan of care.  He did request Xanax prescription-I did advise this is best used short-term because it can be addictive.  He verbally agreed.    Follow-up:  Return if symptoms worsen or fail to improve.    Electronically signed by RUEL Lyman, 21, 11:23 AM CDT.  "

## 2021-11-02 DIAGNOSIS — F41.9 ANXIETY: ICD-10-CM

## 2021-11-02 DIAGNOSIS — E78.1 HYPERTRIGLYCERIDEMIA: ICD-10-CM

## 2021-11-03 RX ORDER — BUSPIRONE HYDROCHLORIDE 15 MG/1
TABLET ORAL
Qty: 30 TABLET | Refills: 2 | Status: SHIPPED | OUTPATIENT
Start: 2021-11-03 | End: 2022-08-29

## 2021-11-03 NOTE — TELEPHONE ENCOUNTER
Rx Refill Note  Requested Prescriptions     Pending Prescriptions Disp Refills   • busPIRone (BUSPAR) 15 MG tablet [Pharmacy Med Name: BUSPIRONE HCL 15MG TABLET] 30 tablet 0     Sig: TAKE 1/2 TABLET BY MOUTH TWICE DAILY      Last office visit with prescribing clinician: 7/6/2021      Next office visit with prescribing clinician: 1/11/2022            Elvira Garza MA  11/03/21, 08:00 CDT

## 2021-11-18 ENCOUNTER — OFFICE VISIT (OUTPATIENT)
Dept: FAMILY MEDICINE CLINIC | Facility: CLINIC | Age: 56
End: 2021-11-18

## 2021-11-18 VITALS
DIASTOLIC BLOOD PRESSURE: 70 MMHG | SYSTOLIC BLOOD PRESSURE: 124 MMHG | RESPIRATION RATE: 16 BRPM | HEIGHT: 67 IN | BODY MASS INDEX: 28.56 KG/M2 | WEIGHT: 182 LBS | HEART RATE: 70 BPM | TEMPERATURE: 98.5 F

## 2021-11-18 DIAGNOSIS — J02.9 ACUTE PHARYNGITIS, UNSPECIFIED ETIOLOGY: Primary | ICD-10-CM

## 2021-11-18 DIAGNOSIS — Z20.822 SUSPECTED COVID-19 VIRUS INFECTION: ICD-10-CM

## 2021-11-18 DIAGNOSIS — Z20.818 EXPOSURE TO STREP THROAT: ICD-10-CM

## 2021-11-18 PROCEDURE — 99213 OFFICE O/P EST LOW 20 MIN: CPT | Performed by: NURSE PRACTITIONER

## 2021-11-18 RX ORDER — AMOXICILLIN 500 MG/1
500 TABLET, FILM COATED ORAL 3 TIMES DAILY
Qty: 30 TABLET | Refills: 0 | Status: SHIPPED | OUTPATIENT
Start: 2021-11-18 | End: 2021-11-28

## 2021-11-18 RX ORDER — PREDNISONE 20 MG/1
20 TABLET ORAL DAILY
Qty: 5 TABLET | Refills: 0 | Status: SHIPPED | OUTPATIENT
Start: 2021-11-18 | End: 2021-11-23

## 2021-11-18 NOTE — PROGRESS NOTES
Subjective   Chief Complaint:  Sore throat, exposure to strep    History of Present Illness:  This 56 y.o. male was seen in the office today.  Reports runny nose and sore throat, reports has been around his girlfriend son who was seen in urgent care and tested positive for strep.  He reports also some flulike symptoms including body aches and chills.    Allergies   Allergen Reactions   • Adhesive Tape Rash     VERY SENSITIVE TO ADHESIVE    • Latex Rash and Unknown - High Severity      Current Outpatient Medications on File Prior to Visit   Medication Sig   • acetaminophen (TYLENOL) 500 MG tablet Take 500 mg by mouth Every 6 (Six) Hours As Needed for Mild Pain .   • albuterol (PROVENTIL) (2.5 MG/3ML) 0.083% nebulizer solution 3 mL Every 8 (Eight) Hours.   • albuterol sulfate HFA (Ventolin HFA) 108 (90 Base) MCG/ACT inhaler Inhale 2 puffs Every 4 (Four) Hours As Needed for Wheezing or Shortness of Air.   • allopurinol (ZYLOPRIM) 100 MG tablet TAKE 1 TABLET BY MOUTH TWICE DAILY   • ALPRAZolam (Xanax) 0.5 MG tablet Take 1 tablet by mouth 2 (Two) Times a Day As Needed for Anxiety.   • budesonide-formoterol (SYMBICORT) 160-4.5 MCG/ACT inhaler Inhale 2 puffs 2 (Two) Times a Day.   • buPROPion XL (Wellbutrin XL) 150 MG 24 hr tablet Take 1 tablet by mouth Daily.   • busPIRone (BUSPAR) 15 MG tablet TAKE 1/2 TABLET BY MOUTH TWICE DAILY   • gemfibrozil (LOPID) 600 MG tablet TAKE ONE (1) TABLET BY MOUTH DAILY. NEEDS OFFICE APPT.    • hydroCHLOROthiazide (HYDRODIURIL) 25 MG tablet TAKE ONE (1) TABLET BY MOUTH DAILY    • ibuprofen (ADVIL,MOTRIN) 600 MG tablet Take 600 mg by mouth Every 6 (Six) Hours As Needed for Mild Pain .   • ketorolac (ACULAR) 0.5 % ophthalmic solution    • losartan (COZAAR) 25 MG tablet TAKE ONE (1) TABLET BY MOUTH DAILY.    • ofloxacin (OCUFLOX) 0.3 % ophthalmic solution    • pantoprazole (PROTONIX) 40 MG EC tablet TAKE 1 TABLET BY MOUTH DAILY   • prednisoLONE acetate (PRED FORTE) 1 % ophthalmic suspension     • tadalafil (Cialis) 20 MG tablet Take 1 tablet by mouth Daily As Needed for Erectile Dysfunction.   • fluticasone (FLONASE) 50 MCG/ACT nasal spray 2 sprays into the nostril(s) as directed by provider Daily for 30 days.     No current facility-administered medications on file prior to visit.      Past Medical, Surgical, Social, and Family History:  Past Medical History:   Diagnosis Date   • Anemia    • Arthritis    • Back pain    • Charcot-Claire-Tooth disease     DX AT 19    • Claustrophobia    • COPD (chronic obstructive pulmonary disease) (CMS/HCC)    • Elevated cholesterol    • Gait abnormality     DUE TO CMT    • GERD (gastroesophageal reflux disease)    • Hyperlipidemia    • Hypertension    • Irritable bowel    • Other diseases of vocal cords     NARROW VOICE BOX    • Sleep apnea     DOES NOT USE BIPAP OR CPAP      Past Surgical History:   Procedure Laterality Date   • BICEPS TENDONESIS SUBPECTORALIS REPAIR Right 6/28/2019    Procedure: BICEPS TENODESIS - RIGHT;  Surgeon: Yeison Agudelo MD;  Location: [facility] OR;  Service: Orthopedics   • COLONOSCOPY N/A 2/17/2020    Procedure: COLONOSCOPY WITH ANESTHESIA;  Surgeon: Cal Vela DO;  Location: [facility] ENDOSCOPY;  Service: Gastroenterology;  Laterality: N/A;  preop; blood in stool  postop; polyps   PCP anand Jackson    • ENDOSCOPY N/A 2/25/2020    Procedure: ESOPHAGOGASTRODUODENOSCOPY WITH ANESTHESIA;  Surgeon: Cal Vela DO;  Location: [facility] ENDOSCOPY;  Service: Gastroenterology;  Laterality: N/A;  preop; blood in stool  postop; normal   PCP Anand Jackson    • SHOULDER ARTHROSCOPY W/ ROTATOR CUFF REPAIR Right 6/28/2019    Procedure: RIGHT SHOULDER ARTHROSCOPIC ROTATOR CUFF REPAIR, SUBACROMIAL DECOMPRESSION;  Surgeon: Yeison Agudelo MD;  Location: [facility] OR;  Service: Orthopedics     Social History     Socioeconomic History   • Marital status: Single   • Years of education: 10   • Highest education level: GED or equivalent   Tobacco Use   • Smoking  "status: Former Smoker     Types: Cigars     Quit date: 10/2015     Years since quittin.1   • Smokeless tobacco: Former User     Quit date:    Substance and Sexual Activity   • Alcohol use: Yes     Comment: weekly   • Drug use: No   • Sexual activity: Yes     Partners: Female     Family History   Problem Relation Age of Onset   • Cancer Mother    • Diabetes Mother    • Colon polyps Mother    • Hyperlipidemia Father    • Hypertension Father    • Cancer Father    • Colon cancer Neg Hx      Objective   Physical Exam  Constitutional:       General: He is not in acute distress.  HENT:      Mouth/Throat:      Mouth: Mucous membranes are moist.      Pharynx: Oropharyngeal exudate and posterior oropharyngeal erythema present.   Cardiovascular:      Rate and Rhythm: Normal rate and regular rhythm.   Pulmonary:      Effort: Pulmonary effort is normal.      Breath sounds: Normal breath sounds.   Neurological:      Mental Status: He is alert.     /70   Pulse 70   Temp 98.5 °F (36.9 °C)   Resp 16   Ht 170.2 cm (67.01\")   Wt 82.6 kg (182 lb)   BMI 28.50 kg/m²     Assessment/Plan   Diagnoses and all orders for this visit:    1. Acute pharyngitis, unspecified etiology (Primary)    2. Exposure to strep throat    3. Suspected COVID-19 virus infection  -     COVID-19,[facility] IN-HOUSE,Nasal Swab No Transport Media 3-4 HR TAT - Swab, Nasal Cavity; Future    Other orders  -     amoxicillin (AMOXIL) 500 MG tablet; Take 1 tablet by mouth 3 (Three) Times a Day for 10 days.  Dispense: 30 tablet; Refill: 0  -     predniSONE (DELTASONE) 20 MG tablet; Take 1 tablet by mouth Daily for 5 days.  Dispense: 5 tablet; Refill: 0    Discussion:  Advised and educated plan of care.  Covid testing to follow-throat has classic strep appearance-we will treat.  Covid testing since he has some other symptoms that do not align with strep pharyngitis.    Follow-up:  No follow-ups on file.    Electronically signed by RUEL Lyman, " 11/18/21, 3:53 PM CST.

## 2021-11-22 DIAGNOSIS — K21.9 GASTROESOPHAGEAL REFLUX DISEASE: ICD-10-CM

## 2021-11-22 DIAGNOSIS — R06.1 STRIDOR: ICD-10-CM

## 2021-11-22 DIAGNOSIS — J38.02 BILATERAL VOCAL CORD PARESIS: ICD-10-CM

## 2021-11-22 RX ORDER — PANTOPRAZOLE SODIUM 40 MG/1
TABLET, DELAYED RELEASE ORAL
Qty: 90 TABLET | Refills: 1 | Status: SHIPPED | OUTPATIENT
Start: 2021-11-22 | End: 2022-05-02

## 2021-11-22 NOTE — TELEPHONE ENCOUNTER
Rx Refill Note  Requested Prescriptions     Pending Prescriptions Disp Refills   • pantoprazole (PROTONIX) 40 MG EC tablet [Pharmacy Med Name: PANTOPRAZOLE SODIUM 40MG TABLET DR] 90 tablet 1     Sig: TAKE ONE (1) TABLET BY MOUTH DAILY      Last office visit with prescribing clinician: 7/6/2021      Next office visit with prescribing clinician: 1/11/2022            Elvira Garza MA  11/22/21, 16:40 CST

## 2021-11-24 ENCOUNTER — TELEPHONE (OUTPATIENT)
Dept: FAMILY MEDICINE CLINIC | Facility: CLINIC | Age: 56
End: 2021-11-24

## 2021-11-24 NOTE — TELEPHONE ENCOUNTER
Since fax is out, if you got a verbal - ok to tell patient.  If we find out otherwise, will let him know.    Electronically signed by RUEL Lyman, 11/24/21, 3:53 PM CST.

## 2021-11-24 NOTE — TELEPHONE ENCOUNTER
Caller: Raoul Gibbs    Relationship: Self    Best call back number:[phone]    Caller requesting test results: SELF    What test was performed: COVID TEST     When was the test performed: LAST WEEK     Where was the test performed: [facility]

## 2022-01-03 ENCOUNTER — OFFICE VISIT (OUTPATIENT)
Dept: FAMILY MEDICINE CLINIC | Facility: CLINIC | Age: 57
End: 2022-01-03

## 2022-01-03 VITALS
RESPIRATION RATE: 18 BRPM | HEIGHT: 67 IN | OXYGEN SATURATION: 96 % | WEIGHT: 181.2 LBS | SYSTOLIC BLOOD PRESSURE: 114 MMHG | BODY MASS INDEX: 28.44 KG/M2 | TEMPERATURE: 97.8 F | HEART RATE: 104 BPM | DIASTOLIC BLOOD PRESSURE: 76 MMHG

## 2022-01-03 DIAGNOSIS — Z12.5 ENCOUNTER FOR PROSTATE CANCER SCREENING: ICD-10-CM

## 2022-01-03 DIAGNOSIS — G60.0 CHARCOT-MARIE-TOOTH DISEASE: ICD-10-CM

## 2022-01-03 DIAGNOSIS — I10 ESSENTIAL HYPERTENSION: ICD-10-CM

## 2022-01-03 DIAGNOSIS — Z87.891 PERSONAL HISTORY OF NICOTINE DEPENDENCE: Primary | ICD-10-CM

## 2022-01-03 DIAGNOSIS — F32.9 REACTIVE DEPRESSION: ICD-10-CM

## 2022-01-03 DIAGNOSIS — E61.1 LOW IRON: ICD-10-CM

## 2022-01-03 DIAGNOSIS — Z12.2 ENCOUNTER FOR SCREENING FOR LUNG CANCER: ICD-10-CM

## 2022-01-03 DIAGNOSIS — Z87.891 PERSONAL HISTORY OF TOBACCO USE, PRESENTING HAZARDS TO HEALTH: Primary | ICD-10-CM

## 2022-01-03 DIAGNOSIS — E79.0 HYPERURICEMIA: ICD-10-CM

## 2022-01-03 DIAGNOSIS — I10 HYPERTENSION, UNSPECIFIED TYPE: ICD-10-CM

## 2022-01-03 DIAGNOSIS — E61.1 IRON DEFICIENCY: ICD-10-CM

## 2022-01-03 DIAGNOSIS — E78.1 HYPERTRIGLYCERIDEMIA: ICD-10-CM

## 2022-01-03 DIAGNOSIS — K21.9 GASTROESOPHAGEAL REFLUX DISEASE, UNSPECIFIED WHETHER ESOPHAGITIS PRESENT: ICD-10-CM

## 2022-01-03 DIAGNOSIS — M10.9 GOUT OF RIGHT FOOT, UNSPECIFIED CAUSE, UNSPECIFIED CHRONICITY: ICD-10-CM

## 2022-01-03 DIAGNOSIS — R26.9 GAIT DIFFICULTY: ICD-10-CM

## 2022-01-03 PROBLEM — K92.1 BLOOD IN STOOL: Status: RESOLVED | Noted: 2020-01-27 | Resolved: 2022-01-03

## 2022-01-03 PROCEDURE — 99214 OFFICE O/P EST MOD 30 MIN: CPT | Performed by: FAMILY MEDICINE

## 2022-01-03 NOTE — PROGRESS NOTES
"Subjective   Raoul Gibbs is a 56 y.o. male presenting with chief complaint of:   Chief Complaint   Patient presents with   • Balance Issues     uses a cane   • Post-op Follow-up     \"my last eye surgery was in Nov\"       History of Present Illness :  Alone.  Here for primarily visit to review his chronic problems including GE reflux and others.       Has multiple chronic problems to consider that might have a bearing on today's issues;  an interval appointment.       Chronic/acute problems reviewed today:   1. Encounter for prostate cancer screening chronic ongoing need to review his risk for prostate cancer as he is a male over 50.  He says he voids to completion and denies hematuria   2. Gastroesophageal reflux disease, unspecified whether esophagitis present Chronic/stable.  Controlled heartburn, reflux without dysphagia, melena.  Rx used, periods not used proven needed with symptoms -currently doing ok.    Colon-p,hem/Dane/JAMES/2.17.20/5y  EGD-nl/JAMES/Dane/2.25.20     3. Reactive depression chronic past significant  mood swings, down moods, nervousness, difficulty with concentration to function home/work.  Others close have not been concerned.  No suicide ideation/intent.  Rx helps      4. Low iron chronic past history of low iron; denies blood in his stool and he had his colonoscopy since last seen   5. Gout of right foot, unspecified cause, unspecified chronicity    6. Iron deficiency history of iron deficiency; blood in his stool and negative colonoscopy upper GI endoscopy   8. Hyperuricemia-zyloprim Chronic/stable.  Rx improved uric acid levels with less flares of gout.  Rx tolerated.     9. Charcot-Claire-Tooth disease chronic condition unchanged, his left him with ankle-foot deformities and difficulty walking   10. Gait difficulty Chronic/stable:.  Ongoing issues with difficulties it results in difficulty with walking or gait.  No recent falls.  No recent injuries.  Uses to help gait: holding on/cane.   "   11. Hypertriglyceridemia Chronic/stable.  Tolerated use of Rx with labs showing improved lipid values and tolerant liver labs. No muscle aches unexpected.      12. Hypertension, unspecified type Chronic/stable. Stable here past/no recent home blood pressures.  No significant chest pain, SOB, LE edema, orthopnea, near syncope, dizziness/light headness.   Recent Vitals       8/30/2021 11/18/2021 1/3/2022       BP: 122/88 124/70 114/76     Pulse: 90 70 104     Temp: 98.3 °F (36.8 °C) 98.5 °F (36.9 °C) 97.8 °F (36.6 °C)     Weight: 77.7 kg (171 lb 6.4 oz) 82.6 kg (182 lb) 82.2 kg (181 lb 3.2 oz)     BMI (Calculated): 26.8 28.5 28.4              Has an/another acute issue today: none.    The following portions of the patient's history were reviewed and updated as appropriate: allergies, current medications, past family history, past medical history, past social history, past surgical history and problem list.      Current Outpatient Medications:   •  albuterol sulfate HFA (Ventolin HFA) 108 (90 Base) MCG/ACT inhaler, Inhale 2 puffs Every 4 (Four) Hours As Needed for Wheezing or Shortness of Air., Disp: 18 g, Rfl: 5  •  allopurinol (ZYLOPRIM) 100 MG tablet, TAKE 1 TABLET BY MOUTH TWICE DAILY (Patient taking differently: Usually takes daily), Disp: 60 tablet, Rfl: 5  •  ALPRAZolam (Xanax) 0.5 MG tablet, Take 1 tablet by mouth 2 (Two) Times a Day As Needed for Anxiety., Disp: 30 tablet, Rfl: 0  •  buPROPion XL (Wellbutrin XL) 150 MG 24 hr tablet, Take 1 tablet by mouth Daily., Disp: 30 tablet, Rfl: 5  •  busPIRone (BUSPAR) 15 MG tablet, TAKE 1/2 TABLET BY MOUTH TWICE DAILY, Disp: 30 tablet, Rfl: 2  •  gemfibrozil (LOPID) 600 MG tablet, TAKE ONE (1) TABLET BY MOUTH DAILY. NEEDS OFFICE APPT. , Disp: 90 tablet, Rfl: 2  •  hydroCHLOROthiazide (HYDRODIURIL) 25 MG tablet, TAKE ONE (1) TABLET BY MOUTH DAILY , Disp: 90 tablet, Rfl: 1  •  losartan (COZAAR) 25 MG tablet, TAKE ONE (1) TABLET BY MOUTH DAILY. , Disp: 90 tablet, Rfl:  2  •  pantoprazole (PROTONIX) 40 MG EC tablet, TAKE ONE (1) TABLET BY MOUTH DAILY, Disp: 90 tablet, Rfl: 1  •  tadalafil (Cialis) 20 MG tablet, Take 1 tablet by mouth Daily As Needed for Erectile Dysfunction., Disp: 30 tablet, Rfl: 1  •  acetaminophen (TYLENOL) 500 MG tablet, Take 500 mg by mouth Every 6 (Six) Hours As Needed for Mild Pain ., Disp: , Rfl:   •  albuterol (PROVENTIL) (2.5 MG/3ML) 0.083% nebulizer solution, 3 mL Every 8 (Eight) Hours., Disp: , Rfl:   •  budesonide-formoterol (SYMBICORT) 160-4.5 MCG/ACT inhaler, Inhale 2 puffs 2 (Two) Times a Day., Disp: 3 inhaler, Rfl: 2  •  fluticasone (FLONASE) 50 MCG/ACT nasal spray, 2 sprays into the nostril(s) as directed by provider Daily for 30 days., Disp: 16 g, Rfl: 0  •  ibuprofen (ADVIL,MOTRIN) 600 MG tablet, Take 600 mg by mouth Every 6 (Six) Hours As Needed for Mild Pain ., Disp: , Rfl:     No problems with medications.    Allergies   Allergen Reactions   • Adhesive Tape Rash     VERY SENSITIVE TO ADHESIVE    • Latex Rash and Unknown - High Severity       Review of Systems  GENERAL:  Inactive/slower with limits, speed, stamina for age and UE/LE weakness. Sleep is ok; apnea denied. No fevers.  SKIN: No rash/skin lesion of concern.   ENDO:  No syncope, near or diaphoretic sweaty spells.  BS never an issue.  HEENT: No recent head injury; or headache.  No vision change.  No significant hearing loss.  Ears without pain/drainage.  No sore throat.  No significant nasal/sinus congestion/drainage. No epistaxis.  CHEST: No chest wall tenderness or mass.  No significant cough, with occ wheeze.  No SOB; no hemoptysis.  CV: No exertional chest pain, palpitations, ankle edema.  GI: No dysphagia or heartburn.  No abdominal pain, diarrhea, constipation.  No rectal bleeding, or melena.  No colonoscopy.   :  Voids without dysuria, or incontinence to completion.  ORTHO: No painful/swollen joints but various on /off sore.  Same daily sore neck or back.  No acute neck or  back pain without recent injury.  Inversion injury L ankle/above.   NEURO: No focal/significant weakness of extremities.  No dizziness.   Occ UE/LE numbness/paresthesias.   PSYCH: No memory loss.  Mood good; not that anxious, depressed but/and not suicidal.  Tries to tolerate stress .   Screening:  Mammogram: NA  Bone density: NA  Low dose CT chest: Tobacco-smoker/age 17/1ppd/dc 2015 (35):   GI:   Colon-p,hem/Dane/JAMES/2.17.20/5y  EGD-nl/JAMES/Dane/2.25.20  Prostate: offered 1.3.22/declined  Usual lab order  12m CBC, CMP, LIPID, TSH, fT4, Vit D, B12, folate, iron, PSAs, sed rate, CR-protein    Data reviewed:   Recent admit/ER/MD visits: gi testing  Last cardiac testing:   Echo: none    Lab Results:  Results for orders placed or performed in visit on 07/20/21   Comprehensive Metabolic Panel    Specimen: Blood    BLOOD   Result Value Ref Range    Glucose 80 65 - 99 mg/dL    BUN 16 6 - 20 mg/dL    Creatinine 0.88 0.76 - 1.27 mg/dL    eGFR Non African Am 90 >60 mL/min/1.73    eGFR African Am 109 >60 mL/min/1.73    BUN/Creatinine Ratio 18.2 7.0 - 25.0    Sodium 138 136 - 145 mmol/L    Potassium 4.1 3.5 - 5.2 mmol/L    Chloride 96 (L) 98 - 107 mmol/L    Total CO2 30.3 (H) 22.0 - 29.0 mmol/L    Calcium 9.6 8.6 - 10.5 mg/dL    Total Protein 7.1 6.0 - 8.5 g/dL    Albumin 4.80 3.50 - 5.20 g/dL    Globulin 2.3 gm/dL    A/G Ratio 2.1 g/dL    Total Bilirubin 0.7 0.0 - 1.2 mg/dL    Alkaline Phosphatase 58 39 - 117 U/L    AST (SGOT) 15 1 - 40 U/L    ALT (SGPT) 16 1 - 41 U/L   Lipid Panel    Specimen: Blood    BLOOD   Result Value Ref Range    Total Cholesterol 174 0 - 200 mg/dL    Triglycerides 230 (H) 0 - 150 mg/dL    HDL Cholesterol 35 (L) 40 - 60 mg/dL    VLDL Cholesterol Wyatt 39 5 - 40 mg/dL    LDL Chol Calc (NIH) 100 0 - 100 mg/dL   TSH    Specimen: Blood    BLOOD   Result Value Ref Range    TSH 0.979 0.270 - 4.200 uIU/mL   CBC & Differential    Specimen: Blood    BLOOD   Result Value Ref Range    WBC 5.82 3.40 - 10.80 10*3/mm3     RBC 5.12 4.14 - 5.80 10*6/mm3    Hemoglobin 16.5 13.0 - 17.7 g/dL    Hematocrit 48.3 37.5 - 51.0 %    MCV 94.3 79.0 - 97.0 fL    MCH 32.2 26.6 - 33.0 pg    MCHC 34.2 31.5 - 35.7 g/dL    RDW 12.6 12.3 - 15.4 %    Platelets 239 140 - 450 10*3/mm3    Neutrophil Rel % 58.4 42.7 - 76.0 %    Lymphocyte Rel % 20.3 19.6 - 45.3 %    Monocyte Rel % 13.1 (H) 5.0 - 12.0 %    Eosinophil Rel % 6.5 (H) 0.3 - 6.2 %    Basophil Rel % 1.4 0.0 - 1.5 %    Neutrophils Absolute 3.40 1.70 - 7.00 10*3/mm3    Lymphocytes Absolute 1.18 0.70 - 3.10 10*3/mm3    Monocytes Absolute 0.76 0.10 - 0.90 10*3/mm3    Eosinophils Absolute 0.38 0.00 - 0.40 10*3/mm3    Basophils Absolute 0.08 0.00 - 0.20 10*3/mm3    Immature Granulocyte Rel % 0.3 0.0 - 0.5 %    Immature Grans Absolute 0.02 0.00 - 0.05 10*3/mm3    nRBC 0.0 0.0 - 0.2 /100 WBC       A1C:No results for input(s): HGBA1C in the last 32291 hours.  GLUCOSE:  Lab Results - Last 18 Months   Lab Units 07/20/21  1015   GLUCOSE mg/dL 80     LIPID:  Lab Results - Last 18 Months   Lab Units 07/20/21  1015   CHOLESTEROL mg/dL 174   LDL CHOL mg/dL 100   HDL CHOL mg/dL 35*   TRIGLYCERIDES mg/dL 230*     PSA:No results for input(s): PSA in the last 19812 hours.  CBC:  Lab Results - Last 18 Months   Lab Units 07/20/21  1015   WBC 10*3/mm3 5.82   HEMOGLOBIN g/dL 16.5   HEMATOCRIT % 48.3   PLATELETS 10*3/mm3 239      BMP/CMP:  Lab Results - Last 18 Months   Lab Units 07/20/21  1015   SODIUM mmol/L 138   POTASSIUM mmol/L 4.1   CHLORIDE mmol/L 96*   TOTAL CO2 mmol/L 30.3*   GLUCOSE mg/dL 80   BUN mg/dL 16   CREATININE mg/dL 0.88   EGFR IF NONAFRICN AM mL/min/1.73 90   EGFR IF AFRICN AM mL/min/1.73 109   CALCIUM mg/dL 9.6     HEPATIC:  Lab Results - Last 18 Months   Lab Units 07/20/21  1015   ALT (SGPT) U/L 16   AST (SGOT) U/L 15   ALK PHOS U/L 58     THYROID:  Lab Results - Last 18 Months   Lab Units 07/20/21  1015   TSH uIU/mL 0.979       Objective   /76 (BP Location: Left arm, Patient Position:  "Sitting, Cuff Size: Large Adult)   Pulse 104   Temp 97.8 °F (36.6 °C) (Infrared)   Resp 18   Ht 170.2 cm (67\")   Wt 82.2 kg (181 lb 3.2 oz)   SpO2 96%   BMI 28.38 kg/m²   Body mass index is 28.38 kg/m².      Recent Vitals       8/30/2021 11/18/2021 1/3/2022       BP: 122/88 124/70 114/76     Pulse: 90 70 104     Temp: 98.3 °F (36.8 °C) 98.5 °F (36.9 °C) 97.8 °F (36.6 °C)     Weight: 77.7 kg (171 lb 6.4 oz) 82.6 kg (182 lb) 82.2 kg (181 lb 3.2 oz)     BMI (Calculated): 26.8 28.5 28.4           Physical Exam  GENERAL:  Well nourished/developed in no acute distress.   SKIN: Turgor excellent, without wound, rash, lesion  HEENT: Normal cephalic without trauma.  Pupils equal round reactive to light. Extraocular motions full without nystagmus.   External canals nonobstructive nontender without reddness. Tymphatic membranes bernard with yazan structures intact.   Oral cavity without growths, exudates, and moist.  Posterior pharynx without mass, obstruction, redness.  No thyromegaly, mass, tenderness, lymphadenopathy and supple.  CV: Regular rhythm.  No murmur, gallop,  edema. Posterior pulses intact.  No carotid bruits.  CHEST: No chest wall tenderness or mass.   LUNGS: Symmetric motion with clear/decreased to auscultation.  No dullness to percussion  ABD: Soft, nontender without mass.   PROSTATE: offered  ORTHO: Symmetric extremities without swelling/point tenderness.  Other full gross range of motion.  Walking without assistive device.   NEURO: CN 2-12 grossly intact.  Symmetric facies and UE/LE.  1-2/5 strength throughout. 1/4 x bicep equal reflexes. Marked hand/foot muscle atrophy.  Nonfocal use extremities. Speech clear.  Reduced light touch with monofilament, vibratory sensation with tuning fork; equal toes/distal feet.    PSYCH: Oriented x 3.  Pleasant calm, well kept.  Purposeful/directed conservation with intact short/long gross memory.       Assessment/Plan     1. Encounter for prostate cancer screening    2. " Gastroesophageal reflux disease, unspecified whether esophagitis present    3. Reactive depression    4. Low iron    5. Gout of right foot, unspecified cause, unspecified chronicity    6. Iron deficiency    7. Essential hypertension    8. Hyperuricemia-zyloprim    9. Charcot-Claire-Tooth disease    10. Gait difficulty    11. Hypertriglyceridemia    12. Hypertension, unspecified type        Discussion:  BP ok  Last labs reasonable but some do today    Low dose CT agreed  Offered referral back to neurosurgery-declined  Prostate exam decline    Vaccine reviewed: today none; later covid-covid booster, seasonal flu, Tdap, shingles  Screening reviewed/updated    Medical decision issues:   Data review above:   Rx: reviewed and decisions:   Same Rx for now     Visit today involved chronic significant medical problems or differentials and/or intensive drug monitoring: ie potential to cause serious morbidity or death:   No orders of the defined types were placed in this encounter.    Orders placed:   LAB/Testing/Referrals: reviewed/orders:   Today:   Orders Placed This Encounter   Procedures   • Comprehensive Metabolic Panel   • Uric Acid   • TSH   • T4, Free   • PSA Screen   • Sedimentation Rate   • Iron Profile   • Ferritin   • CBC & Differential   low dose CT hung up with age; will continue to try to get it done after 57    Chronic/recurrent labs above or change to:   same     Health maintenance:   Body mass index is 28.38 kg/m².  Patient's Body mass index is 28.38 kg/m². indicating that he is overweight (BMI 25-29.9). Obesity-related health conditions include the following: dyslipidemias. Obesity is unchanged. BMI is is above average; BMI management plan is completed. We discussed portion control and increasing exercise..      Tobacco use reviewed:   Raoul Gibbs  reports that he quit smoking about 6 years ago. His smoking use included cigars. He quit smokeless tobacco use about 22 years ago..       There are no Patient  Instructions on file for this visit.    Follow up: Return for lab today/, THEN, lab;, Dr Jackson-, 6 m;.  Future Appointments   Date Time Provider Department Center   7/5/2022  8:30 AM [facility]   7/7/2022  9:30 AM Nick Jackson MD [facility]

## 2022-01-04 DIAGNOSIS — R79.89 HIGH SERUM FERRITIN: Primary | ICD-10-CM

## 2022-01-04 LAB
ALBUMIN SERPL-MCNC: 5.1 G/DL (ref 3.5–5.2)
ALBUMIN/GLOB SERPL: 2.4 G/DL
ALP SERPL-CCNC: 55 U/L (ref 39–117)
ALT SERPL-CCNC: 32 U/L (ref 1–41)
AST SERPL-CCNC: 20 U/L (ref 1–40)
BASOPHILS # BLD AUTO: 0.07 10*3/MM3 (ref 0–0.2)
BASOPHILS NFR BLD AUTO: 1 % (ref 0–1.5)
BILIRUB SERPL-MCNC: 0.5 MG/DL (ref 0–1.2)
BUN SERPL-MCNC: 15 MG/DL (ref 6–20)
BUN/CREAT SERPL: 15 (ref 7–25)
CALCIUM SERPL-MCNC: 9.7 MG/DL (ref 8.6–10.5)
CHLORIDE SERPL-SCNC: 98 MMOL/L (ref 98–107)
CO2 SERPL-SCNC: 34 MMOL/L (ref 22–29)
CREAT SERPL-MCNC: 1 MG/DL (ref 0.76–1.27)
EOSINOPHIL # BLD AUTO: 0.32 10*3/MM3 (ref 0–0.4)
EOSINOPHIL NFR BLD AUTO: 4.6 % (ref 0.3–6.2)
ERYTHROCYTE [DISTWIDTH] IN BLOOD BY AUTOMATED COUNT: 12.5 % (ref 12.3–15.4)
ERYTHROCYTE [SEDIMENTATION RATE] IN BLOOD BY WESTERGREN METHOD: <1 MM/HR (ref 0–20)
FERRITIN SERPL-MCNC: 706 NG/ML (ref 30–400)
GLOBULIN SER CALC-MCNC: 2.1 GM/DL
GLUCOSE SERPL-MCNC: 94 MG/DL (ref 65–99)
HCT VFR BLD AUTO: 51.4 % (ref 37.5–51)
HGB BLD-MCNC: 17.3 G/DL (ref 13–17.7)
IMM GRANULOCYTES # BLD AUTO: 0.06 10*3/MM3 (ref 0–0.05)
IMM GRANULOCYTES NFR BLD AUTO: 0.9 % (ref 0–0.5)
IRON SATN MFR SERPL: 25 % (ref 20–50)
IRON SERPL-MCNC: 99 MCG/DL (ref 59–158)
LYMPHOCYTES # BLD AUTO: 1.15 10*3/MM3 (ref 0.7–3.1)
LYMPHOCYTES NFR BLD AUTO: 16.4 % (ref 19.6–45.3)
MCH RBC QN AUTO: 30.6 PG (ref 26.6–33)
MCHC RBC AUTO-ENTMCNC: 33.7 G/DL (ref 31.5–35.7)
MCV RBC AUTO: 91 FL (ref 79–97)
MONOCYTES # BLD AUTO: 0.72 10*3/MM3 (ref 0.1–0.9)
MONOCYTES NFR BLD AUTO: 10.2 % (ref 5–12)
NEUTROPHILS # BLD AUTO: 4.71 10*3/MM3 (ref 1.7–7)
NEUTROPHILS NFR BLD AUTO: 66.9 % (ref 42.7–76)
NRBC BLD AUTO-RTO: 0 /100 WBC (ref 0–0.2)
PLATELET # BLD AUTO: 251 10*3/MM3 (ref 140–450)
POTASSIUM SERPL-SCNC: 4.2 MMOL/L (ref 3.5–5.2)
PROT SERPL-MCNC: 7.2 G/DL (ref 6–8.5)
PSA SERPL-MCNC: 0.65 NG/ML (ref 0–4)
RBC # BLD AUTO: 5.65 10*6/MM3 (ref 4.14–5.8)
SODIUM SERPL-SCNC: 138 MMOL/L (ref 136–145)
T4 FREE SERPL-MCNC: 1.27 NG/DL (ref 0.93–1.7)
TIBC SERPL-MCNC: 393 MCG/DL
TSH SERPL DL<=0.005 MIU/L-ACNC: 1.24 UIU/ML (ref 0.27–4.2)
UIBC SERPL-MCNC: 294 MCG/DL (ref 112–346)
URATE SERPL-MCNC: 7.2 MG/DL (ref 3.4–7)
WBC # BLD AUTO: 7.03 10*3/MM3 (ref 3.4–10.8)

## 2022-01-19 ENCOUNTER — HOSPITAL ENCOUNTER (OUTPATIENT)
Dept: CT IMAGING | Facility: HOSPITAL | Age: 57
Discharge: HOME | End: 2022-01-19
Admitting: FAMILY MEDICINE

## 2022-01-19 ENCOUNTER — DOCUMENTATION (OUTPATIENT)
Dept: CT IMAGING | Facility: HOSPITAL | Age: 57
End: 2022-01-19

## 2022-01-19 DIAGNOSIS — Z87.891 PERSONAL HISTORY OF TOBACCO USE, PRESENTING HAZARDS TO HEALTH: ICD-10-CM

## 2022-01-19 PROCEDURE — 71271 CT THORAX LUNG CANCER SCR C-: CPT

## 2022-01-21 ENCOUNTER — DOCUMENTATION (OUTPATIENT)
Dept: CT IMAGING | Facility: HOSPITAL | Age: 57
End: 2022-01-21

## 2022-01-21 ENCOUNTER — OFFICE VISIT (OUTPATIENT)
Dept: FAMILY MEDICINE CLINIC | Facility: CLINIC | Age: 57
End: 2022-01-21

## 2022-01-21 VITALS
WEIGHT: 181 LBS | SYSTOLIC BLOOD PRESSURE: 118 MMHG | RESPIRATION RATE: 16 BRPM | HEIGHT: 67 IN | OXYGEN SATURATION: 96 % | DIASTOLIC BLOOD PRESSURE: 76 MMHG | HEART RATE: 104 BPM | TEMPERATURE: 98 F | BODY MASS INDEX: 28.41 KG/M2

## 2022-01-21 DIAGNOSIS — G60.0 CHARCOT-MARIE-TOOTH DISEASE: ICD-10-CM

## 2022-01-21 DIAGNOSIS — R29.818 DISABILITY DUE TO NEUROLOGICAL DISORDER: ICD-10-CM

## 2022-01-21 DIAGNOSIS — Z20.822 SUSPECTED COVID-19 VIRUS INFECTION: Primary | ICD-10-CM

## 2022-01-21 PROCEDURE — 99213 OFFICE O/P EST LOW 20 MIN: CPT | Performed by: NURSE PRACTITIONER

## 2022-01-21 NOTE — PROGRESS NOTES
Subjective   Chief Complaint:  Cough, disability forms    History of Present Illness:  This 56 y.o. male was seen in the office today.  He reports acute cough starting yesterday morning, reports has been dry thus far.  Denies any other symptoms.  Reports fully vaccinated against COVID-19.  He presents today for recertification form, elevated stability-evidently any ambulation greater than 200 feet he requires assistive devices.    Allergies   Allergen Reactions   • Adhesive Tape Rash     VERY SENSITIVE TO ADHESIVE    • Latex Rash and Unknown - High Severity      Current Outpatient Medications on File Prior to Visit   Medication Sig   • acetaminophen (TYLENOL) 500 MG tablet Take 500 mg by mouth Every 6 (Six) Hours As Needed for Mild Pain .   • albuterol (PROVENTIL) (2.5 MG/3ML) 0.083% nebulizer solution 3 mL Every 8 (Eight) Hours.   • albuterol sulfate HFA (Ventolin HFA) 108 (90 Base) MCG/ACT inhaler Inhale 2 puffs Every 4 (Four) Hours As Needed for Wheezing or Shortness of Air.   • allopurinol (ZYLOPRIM) 100 MG tablet TAKE 1 TABLET BY MOUTH TWICE DAILY (Patient taking differently: Usually takes daily)   • ALPRAZolam (Xanax) 0.5 MG tablet Take 1 tablet by mouth 2 (Two) Times a Day As Needed for Anxiety.   • budesonide-formoterol (SYMBICORT) 160-4.5 MCG/ACT inhaler Inhale 2 puffs 2 (Two) Times a Day.   • buPROPion XL (Wellbutrin XL) 150 MG 24 hr tablet Take 1 tablet by mouth Daily.   • busPIRone (BUSPAR) 15 MG tablet TAKE 1/2 TABLET BY MOUTH TWICE DAILY   • gemfibrozil (LOPID) 600 MG tablet TAKE ONE (1) TABLET BY MOUTH DAILY. NEEDS OFFICE APPT.    • hydroCHLOROthiazide (HYDRODIURIL) 25 MG tablet TAKE ONE (1) TABLET BY MOUTH DAILY    • ibuprofen (ADVIL,MOTRIN) 600 MG tablet Take 600 mg by mouth Every 6 (Six) Hours As Needed for Mild Pain .   • losartan (COZAAR) 25 MG tablet TAKE ONE (1) TABLET BY MOUTH DAILY.    • pantoprazole (PROTONIX) 40 MG EC tablet TAKE ONE (1) TABLET BY MOUTH DAILY   • tadalafil (Cialis) 20 MG  tablet Take 1 tablet by mouth Daily As Needed for Erectile Dysfunction.   • fluticasone (FLONASE) 50 MCG/ACT nasal spray 2 sprays into the nostril(s) as directed by provider Daily for 30 days.     No current facility-administered medications on file prior to visit.      Past Medical, Surgical, Social, and Family History:  Past Medical History:   Diagnosis Date   • Anemia    • Arthritis    • Back pain    • Charcot-Claire-Tooth disease     DX AT 19    • Claustrophobia    • COPD (chronic obstructive pulmonary disease) (HCC)    • Elevated cholesterol    • Gait abnormality     DUE TO CMT    • GERD (gastroesophageal reflux disease)    • Hyperlipidemia    • Hypertension    • Irritable bowel    • Other diseases of vocal cords     NARROW VOICE BOX    • Sleep apnea     DOES NOT USE BIPAP OR CPAP      Past Surgical History:   Procedure Laterality Date   • BICEPS TENDONESIS SUBPECTORALIS REPAIR Right 6/28/2019    Procedure: BICEPS TENODESIS - RIGHT;  Surgeon: Yeison Agudelo MD;  Location: [facility] OR;  Service: Orthopedics   • COLONOSCOPY N/A 2/17/2020    Procedure: COLONOSCOPY WITH ANESTHESIA;  Surgeon: Cal Vela DO;  Location: [facility] ENDOSCOPY;  Service: Gastroenterology;  Laterality: N/A;  preop; blood in stool  postop; polyps   PCP anand Jackson    • ENDOSCOPY N/A 2/25/2020    Procedure: ESOPHAGOGASTRODUODENOSCOPY WITH ANESTHESIA;  Surgeon: Cal Vela DO;  Location: [facility] ENDOSCOPY;  Service: Gastroenterology;  Laterality: N/A;  preop; blood in stool  postop; normal   PCP Anand Jackson    • SHOULDER ARTHROSCOPY W/ ROTATOR CUFF REPAIR Right 6/28/2019    Procedure: RIGHT SHOULDER ARTHROSCOPIC ROTATOR CUFF REPAIR, SUBACROMIAL DECOMPRESSION;  Surgeon: Yeison Agudelo MD;  Location: [facility] OR;  Service: Orthopedics     Social History     Socioeconomic History   • Marital status: Single   • Years of education: 10   • Highest education level: GED or equivalent   Tobacco Use   • Smoking status: Former Smoker     Types:  Cigars     Quit date: 10/2015     Years since quittin.3   • Smokeless tobacco: Former User     Quit date:    Substance and Sexual Activity   • Alcohol use: Yes     Comment: weekly   • Drug use: No   • Sexual activity: Yes     Partners: Female     Family History   Problem Relation Age of Onset   • Cancer Mother    • Diabetes Mother    • Colon polyps Mother    • Hyperlipidemia Father    • Hypertension Father    • Cancer Father    • Colon cancer Neg Hx      Objective   Physical Exam  Vitals and nursing note reviewed.   Constitutional:       General: He is not in acute distress.     Appearance: He is not diaphoretic.   HENT:      Head: Normocephalic and atraumatic.      Nose: Nose normal.   Eyes:      Conjunctiva/sclera: Conjunctivae normal.      Pupils: Pupils are equal, round, and reactive to light.   Neck:      Thyroid: No thyromegaly.      Vascular: No JVD.      Trachea: No tracheal deviation.   Cardiovascular:      Rate and Rhythm: Normal rate and regular rhythm.      Heart sounds: Normal heart sounds. No murmur heard.  No friction rub. No gallop.    Pulmonary:      Effort: Pulmonary effort is normal. No respiratory distress.      Breath sounds: Normal breath sounds. No wheezing.   Abdominal:      General: Bowel sounds are normal.      Palpations: Abdomen is soft.      Tenderness: There is no abdominal tenderness. There is no guarding.   Musculoskeletal:         General: No tenderness or deformity. Normal range of motion.      Cervical back: Normal range of motion and neck supple.   Lymphadenopathy:      Cervical: No cervical adenopathy.   Skin:     General: Skin is warm and dry.      Capillary Refill: Capillary refill takes less than 2 seconds.   Neurological:      Mental Status: He is alert and oriented to person, place, and time.   Psychiatric:         Behavior: Behavior normal.         Thought Content: Thought content normal.         Judgment: Judgment normal.     /76   Pulse 104   Temp 98 °F  "(36.7 °C)   Resp 16   Ht 170.2 cm (67\")   Wt 82.1 kg (181 lb)   SpO2 96%   BMI 28.35 kg/m²     Assessment/Plan   Diagnoses and all orders for this visit:    1. Suspected COVID-19 virus infection (Primary)  -     COVID-19,[facility],NP/OP Swab in [employer] or ESwab 72 HR TAT - Swab, Nasopharynx; Future    2. Disability due to neurological disorder    3. Charcot-Claire-Tooth disease    Discussion:  Advised and educated plan of care.  Recertification form for disability signed and given back to patient.  He is going to go for COVID testing.    Follow-up:  No follow-ups on file.    Electronically signed by RUEL Lyman, 01/21/22, 9:44 AM CST.  "

## 2022-01-24 ENCOUNTER — TELEPHONE (OUTPATIENT)
Dept: FAMILY MEDICINE CLINIC | Facility: CLINIC | Age: 57
End: 2022-01-24

## 2022-01-24 PROBLEM — Z86.16 HISTORY OF COVID-19: Status: ACTIVE | Noted: 2022-01-24

## 2022-01-24 NOTE — TELEPHONE ENCOUNTER
Caller: Raoul Gibbs    Relationship: Self    Best call back number: [phone] (H)    What orders are you requesting (i.e. lab or imaging): INFUSION -PLEASE CALL PATIENT  BEFORE WRITING ORDER HE WOULD LIKE TO BE ADVISED     In what timeframe would the patient need to come in: SOON     Where will you receive your lab/imaging services: [facility]     Additional notes: STATES PLEASE CALL BACK BEFORE WRITING THE ORDER HE HAS SOME QUESTIONS TO BE ADVISED ABOUT

## 2022-01-24 NOTE — TELEPHONE ENCOUNTER
Caller: Raoul Hopkins    Relationship: Self    Best call back number: [phone]     What is the best time to reach you: ANYTIME     Who are you requesting to speak with (clinical staff, provider,  specific staff member): CLINICAL STAFF     Do you know the name of the person who called: RAOUL HOPKINS     What was the call regarding: PATIENT STATED THAT HE RECEIVED COVID-19 RESULTS ON MY CHART BUT IS UNABLE TO READ THEM AND IS REQUESTING A CALLBACK TO DISCUSS RESULTS. PLEASE ADVISE.     Do you require a callback: YES

## 2022-01-24 NOTE — TELEPHONE ENCOUNTER
Advise with current criteria, I will not be able to offer an infusion.  I can offer oral antivirals if desired and can drive to [place].    Electronically signed by RUEL Lyman, 01/24/22, 2:15 PM CST.

## 2022-01-24 NOTE — TELEPHONE ENCOUNTER
"notified pt and stated understanding, and is positive, and is not having any symptoms \"I feel fine\" pt has had 2 vaccines and not boosted yet  "

## 2022-01-24 NOTE — TELEPHONE ENCOUNTER
Called patient and advised that he started getting sick on Wed and will take the vitamins, will try to get pulse ox, will call if has any problems

## 2022-01-27 PROBLEM — Z14.8 CARRIER OF HEMOCHROMATOSIS HFE GENE MUTATION: Status: ACTIVE | Noted: 2022-01-27

## 2022-01-31 ENCOUNTER — OFFICE VISIT (OUTPATIENT)
Dept: FAMILY MEDICINE CLINIC | Facility: CLINIC | Age: 57
End: 2022-01-31

## 2022-01-31 VITALS
HEART RATE: 84 BPM | RESPIRATION RATE: 18 BRPM | TEMPERATURE: 97.3 F | OXYGEN SATURATION: 98 % | SYSTOLIC BLOOD PRESSURE: 114 MMHG | DIASTOLIC BLOOD PRESSURE: 80 MMHG | HEIGHT: 67 IN | WEIGHT: 178.3 LBS | BODY MASS INDEX: 27.99 KG/M2

## 2022-01-31 DIAGNOSIS — Z14.8 CARRIER OF HEMOCHROMATOSIS HFE GENE MUTATION: ICD-10-CM

## 2022-01-31 DIAGNOSIS — R79.89 HIGH SERUM FERRITIN: ICD-10-CM

## 2022-01-31 PROCEDURE — 99213 OFFICE O/P EST LOW 20 MIN: CPT | Performed by: FAMILY MEDICINE

## 2022-01-31 NOTE — PROGRESS NOTES
"Subjective   Raoul Gibbs is a 57 y.o. male presenting with chief complaint of:   Chief Complaint   Patient presents with   • Follow-up     \"on my labs\"       History of Present Illness :  Alone.  Here for primarily to discuss recent elevated ferritin and neg hemachromatosis testing.       Has multiple chronic problems to consider that might have a bearing on today's issues; not an interval appointment.       Chronic/acute problems reviewed today:   1. High serum ferritin-see hemachromatosis   Component      Latest Ref Rng & Units 1/3/2022   Ferritin      30.00 - 400.00 ng/mL 706.00 (H)     Hemochromatosis Gene Comment    Comment: Result: C282Y/S65C   Two mutations (C282Y and S65C) identified   Interpretation: This patient's sample was analyzed for the hereditary   hemochromatosis (HH) mutations C282Y, H63D, and S65C.  One copy of   C282Y and one copy of S65C were identified. Results for H63D were   negative.    Uses alcohol on/off     2. Carrier of hemochromatosis HFE gene mutation (offer referral)      Has an/another acute issue today: none.    The following portions of the patient's history were reviewed and updated as appropriate: allergies, current medications, past family history, past medical history, past social history, past surgical history and problem list.      Current Outpatient Medications:   •  albuterol (PROVENTIL) (2.5 MG/3ML) 0.083% nebulizer solution, 3 mL Every 8 (Eight) Hours., Disp: , Rfl:   •  albuterol sulfate HFA (Ventolin HFA) 108 (90 Base) MCG/ACT inhaler, Inhale 2 puffs Every 4 (Four) Hours As Needed for Wheezing or Shortness of Air., Disp: 18 g, Rfl: 5  •  allopurinol (ZYLOPRIM) 100 MG tablet, TAKE 1 TABLET BY MOUTH TWICE DAILY (Patient taking differently: Usually takes daily), Disp: 60 tablet, Rfl: 5  •  buPROPion XL (Wellbutrin XL) 150 MG 24 hr tablet, Take 1 tablet by mouth Daily., Disp: 30 tablet, Rfl: 5  •  busPIRone (BUSPAR) 15 MG tablet, TAKE 1/2 TABLET BY MOUTH TWICE DAILY, " Disp: 30 tablet, Rfl: 2  •  gemfibrozil (LOPID) 600 MG tablet, TAKE ONE (1) TABLET BY MOUTH DAILY. NEEDS OFFICE APPT. , Disp: 90 tablet, Rfl: 2  •  hydroCHLOROthiazide (HYDRODIURIL) 25 MG tablet, TAKE ONE (1) TABLET BY MOUTH DAILY , Disp: 90 tablet, Rfl: 1  •  ibuprofen (ADVIL,MOTRIN) 600 MG tablet, Take 600 mg by mouth Every 6 (Six) Hours As Needed for Mild Pain ., Disp: , Rfl:   •  losartan (COZAAR) 25 MG tablet, TAKE ONE (1) TABLET BY MOUTH DAILY. , Disp: 90 tablet, Rfl: 2  •  pantoprazole (PROTONIX) 40 MG EC tablet, TAKE ONE (1) TABLET BY MOUTH DAILY, Disp: 90 tablet, Rfl: 1  •  tadalafil (Cialis) 20 MG tablet, Take 1 tablet by mouth Daily As Needed for Erectile Dysfunction., Disp: 30 tablet, Rfl: 1  •  acetaminophen (TYLENOL) 500 MG tablet, Take 500 mg by mouth Every 6 (Six) Hours As Needed for Mild Pain ., Disp: , Rfl:   •  ALPRAZolam (Xanax) 0.5 MG tablet, Take 1 tablet by mouth 2 (Two) Times a Day As Needed for Anxiety., Disp: 30 tablet, Rfl: 0  •  budesonide-formoterol (SYMBICORT) 160-4.5 MCG/ACT inhaler, Inhale 2 puffs 2 (Two) Times a Day., Disp: 3 inhaler, Rfl: 2  •  fluticasone (FLONASE) 50 MCG/ACT nasal spray, 2 sprays into the nostril(s) as directed by provider Daily for 30 days., Disp: 16 g, Rfl: 0    No problems with medications.    Allergies   Allergen Reactions   • Adhesive Tape Rash     VERY SENSITIVE TO ADHESIVE    • Latex Rash and Unknown - High Severity       Review of Systems  GENERAL:  Inactive/slower with limits, speed, stamina for age and UE/LE weakness. Sleep is ok; apnea denied. No fevers.  SKIN: No rash/skin lesion of concern.   ENDO:  No syncope, near or diaphoretic sweaty spells.  BS never an issue.  HEENT: No recent head injury; or headache.  No vision change.  No significant hearing loss.  Ears without pain/drainage.  No sore throat.  No significant nasal/sinus congestion/drainage. No epistaxis.  CHEST: No chest wall tenderness or mass.  No significant cough, with occ wheeze.  No  SOB; no hemoptysis.  CV: No exertional chest pain, palpitations, ankle edema.  GI: No dysphagia or heartburn.  No abdominal pain, diarrhea, constipation.  No rectal bleeding, or melena.  No colonoscopy.   :  Voids without dysuria, or incontinence to completion.  ORTHO: No painful/swollen joints but various on /off sore.  Same daily sore neck or back.  No acute neck or back pain without recent injury.  Inversion injury L ankle/above.   NEURO: No focal/significant weakness of extremities.  No dizziness.   Occ UE/LE numbness/paresthesias.   PSYCH: No memory loss.  Mood good; not that anxious, depressed but/and not suicidal.  Tries to tolerate stress .   Screening:  Mammogram: NA  Bone density: NA  Low dose CT chest: Tobacco-smoker/age 17/1ppd/dc 2015 (35):   IMPRESSION:-low dose CT chest 1.19.22  1. No suspicious pulmonary nodule.  2. Lung-RADS 1: Negative  GI:   Colon-p,hem/[facility]/JAMES/2.17.20/5y  EGD-nl/JAMES/[facility]ane/2.25.20  Prostate: offered 1.3.22/declined  Usual lab order  12m CBC, CMP, LIPID, TSH, fT4, Vit D, B12, folate, iron, PSAs, sed rate, CR-protein    Data reviewed:   Recent admit/ER/MD visits: last visit  Last cardiac testing:   Echo: none    Lab Results:  Results for orders placed or performed in visit on 01/21/22   Hemochromatosis Mutation    Specimen: Blood    Blood  [facility] to [facility]   Result Value Ref Range    Hemochromatosis Gene Comment        A1C:No results for input(s): HGBA1C in the last 84253 hours.  GLUCOSE:  Lab Results - Last 18 Months   Lab Units 01/03/22  0853 07/20/21  1015   GLUCOSE mg/dL 94 80     LIPID:  Lab Results - Last 18 Months   Lab Units 07/20/21  1015   CHOLESTEROL mg/dL 174   LDL CHOL mg/dL 100   HDL CHOL mg/dL 35*   TRIGLYCERIDES mg/dL 230*     PSA:  Lab Results - Last 18 Months   Lab Units 01/03/22  0853   PSA ng/mL 0.649     CBC:  Lab Results - Last 18 Months   Lab Units 01/03/22  0853 07/20/21  1015   WBC 10*3/mm3 7.03 5.82   HEMOGLOBIN g/dL 17.3 16.5   HEMATOCRIT % 51.4* 48.3  "  PLATELETS 10*3/mm3 251 239   IRON mcg/dL 99  --       BMP/CMP:  Lab Results - Last 18 Months   Lab Units 01/03/22  0853 07/20/21  1015   SODIUM mmol/L 138 138   POTASSIUM mmol/L 4.2 4.1   CHLORIDE mmol/L 98 96*   TOTAL CO2 mmol/L 34.0* 30.3*   GLUCOSE mg/dL 94 80   BUN mg/dL 15 16   CREATININE mg/dL 1.00 0.88   EGFR IF NONAFRICN AM mL/min/1.73 78 90   EGFR IF AFRICN AM mL/min/1.73 94 109   CALCIUM mg/dL 9.7 9.6     HEPATIC:  Lab Results - Last 18 Months   Lab Units 01/03/22  0853 07/20/21  1015   ALT (SGPT) U/L 32 16   AST (SGOT) U/L 20 15   ALK PHOS U/L 55 58     THYROID:  Lab Results - Last 18 Months   Lab Units 01/03/22  0853 07/20/21  1015   TSH uIU/mL 1.240 0.979   FREE T4 ng/dL 1.27  --        Objective   /80 (BP Location: Left arm, Patient Position: Sitting, Cuff Size: Large Adult)   Pulse 84   Temp 97.3 °F (36.3 °C) (Infrared)   Resp 18   Ht 170.2 cm (67\")   Wt 80.9 kg (178 lb 4.8 oz)   SpO2 98%   BMI 27.93 kg/m²   Body mass index is 27.93 kg/m².      Recent Vitals       1/3/2022 1/21/2022 1/31/2022       BP: 114/76 118/76 114/80     Pulse: 104 104 84     Temp: 97.8 °F (36.6 °C) 98 °F (36.7 °C) 97.3 °F (36.3 °C)     Weight: 82.2 kg (181 lb 3.2 oz) 82.1 kg (181 lb) 80.9 kg (178 lb 4.8 oz)     BMI (Calculated): 28.4 28.3 27.9           Physical Exam  GENERAL:  Well nourished/developed in no acute distress.   SKIN: Turgor excellent, without wound, rash, lesion  HEENT: Normal cephalic without trauma.  Pupils equal round reactive to light. Extraocular motions full without nystagmus.   External canals nonobstructive nontender without reddness. Tymphatic membranes bernard with yazan structures intact.   Oral cavity without growths, exudates, and moist.  Posterior pharynx without mass, obstruction, redness.  No thyromegaly, mass, tenderness, lymphadenopathy and supple.  CV: Regular rhythm.  No murmur, gallop,  edema. Posterior pulses intact.  No carotid bruits.  CHEST: No chest wall tenderness or mass. "   LUNGS: Symmetric motion with clear/decreased to auscultation.  No dullness to percussion  ABD: Soft, nontender without mass.   ORTHO: Symmetric extremities without swelling/point tenderness.  Joints full gross range of motion.  Walking without assistive device.   NEURO: CN 2-12 grossly intact.  Symmetric facies and UE/LE.  1-2/5 strength throughout. 1/4 x bicep equal reflexes. Marked hand/foot muscle atrophy.  Nonfocal use extremities. Speech clear.  Reduced light touch with monofilament, vibratory sensation with tuning fork; equal toes/distal feet.    PSYCH: Oriented x 3.  Pleasant calm, well kept.  Purposeful/directed conservation with intact short/long gross memory.     Assessment/Plan     1. High serum ferritin-see hemachromatosis    2. Carrier of hemochromatosis HFE gene mutation (offer referral)      Discussion:  BP ok  DM/BS ok  Thyroid ok  Liver ok  CBC ok  Renal ok  Lipid ok  PSA ok  Ferritin really high; likely inflammatory; note iron ok    Best review the ferritin with hematology    Immunization History   Administered Date(s) Administered   • COVID-19 (MODERNA) 1st, 2nd, 3rd Dose Only 07/06/2021, 08/06/2021     Vaccine reviewed: today none; later  covid booster, Tdap, shingles, pneumonia  Screening reviewed/updated    Medical decision issues:   Data review above:   Rx: reviewed and decisions:   Same Rx for now     Visit today involved chronic significant medical problems or differentials and/or intensive drug monitoring: ie potential to cause serious morbidity or death:   No orders of the defined types were placed in this encounter.    Orders placed:   LAB/Testing/Referrals: reviewed/orders:   Today:   Orders Placed This Encounter   Procedures   • Ambulatory Referral to Hematology     Chronic/recurrent labs above or change to:   same     Health maintenance:   Body mass index is 27.93 kg/m².  Patient's Body mass index is 27.93 kg/m². indicating that he is overweight (BMI 25-29.9). Obesity-related health  conditions include the following: osteoarthritis. Obesity is unchanged. BMI is is above average; BMI management plan is completed. We discussed portion control and increasing exercise..      Tobacco use reviewed:   Raoul Gibbs  reports that he quit smoking about 6 years ago. His smoking use included cigars. He quit smokeless tobacco use about 22 years ago..  There are no Patient Instructions on file for this visit.    Follow up: Return for Dr Jackson-, as planned;.  Future Appointments   Date Time Provider Department Center   7/5/2022  8:30 AM [facility]   7/7/2022  9:30 AM Nick Jackson MD [facility]

## 2022-02-03 DIAGNOSIS — F41.9 ANXIETY: ICD-10-CM

## 2022-02-03 DIAGNOSIS — R68.89 FLU-LIKE SYMPTOMS: ICD-10-CM

## 2022-02-03 DIAGNOSIS — J40 BRONCHITIS: ICD-10-CM

## 2022-02-03 RX ORDER — DEXAMETHASONE 4 MG/1
TABLET ORAL
Qty: 9 TABLET | Refills: 0 | Status: SHIPPED | OUTPATIENT
Start: 2022-02-03 | End: 2022-02-09

## 2022-02-03 RX ORDER — ALBUTEROL SULFATE 2.5 MG/3ML
2.5 SOLUTION RESPIRATORY (INHALATION) 3 TIMES DAILY
Qty: 90 EACH | Refills: 1 | Status: SHIPPED | OUTPATIENT
Start: 2022-02-03 | End: 2022-02-07 | Stop reason: SDUPTHER

## 2022-02-03 RX ORDER — AMOXICILLIN 875 MG/1
875 TABLET, COATED ORAL 2 TIMES DAILY
Qty: 20 TABLET | Refills: 0 | Status: SHIPPED | OUTPATIENT
Start: 2022-02-03 | End: 2022-02-13

## 2022-02-03 RX ORDER — ALPRAZOLAM 0.5 MG/1
0.5 TABLET ORAL 2 TIMES DAILY PRN
Qty: 30 TABLET | Refills: 0 | Status: SHIPPED | OUTPATIENT
Start: 2022-02-03

## 2022-02-07 DIAGNOSIS — J40 BRONCHITIS: Primary | ICD-10-CM

## 2022-02-07 RX ORDER — ALBUTEROL SULFATE 2.5 MG/3ML
2.5 SOLUTION RESPIRATORY (INHALATION) 3 TIMES DAILY
Qty: 90 EACH | Refills: 1 | Status: SHIPPED | OUTPATIENT
Start: 2022-02-07

## 2022-02-21 DIAGNOSIS — F41.9 ANXIETY: ICD-10-CM

## 2022-02-21 DIAGNOSIS — E78.1 HYPERTRIGLYCERIDEMIA: ICD-10-CM

## 2022-02-21 DIAGNOSIS — F32.9 REACTIVE DEPRESSION: ICD-10-CM

## 2022-02-21 RX ORDER — HYDROCHLOROTHIAZIDE 25 MG/1
TABLET ORAL
Qty: 90 TABLET | Refills: 1 | Status: SHIPPED | OUTPATIENT
Start: 2022-02-21 | End: 2022-09-30

## 2022-02-21 RX ORDER — ALLOPURINOL 100 MG/1
100 TABLET ORAL DAILY
Qty: 90 TABLET | Refills: 1 | Status: SHIPPED | OUTPATIENT
Start: 2022-02-21 | End: 2022-09-12

## 2022-02-21 RX ORDER — BUPROPION HYDROCHLORIDE 150 MG/1
TABLET ORAL
Qty: 30 TABLET | Refills: 5 | Status: SHIPPED | OUTPATIENT
Start: 2022-02-21 | End: 2022-09-30 | Stop reason: SDUPTHER

## 2022-02-24 ENCOUNTER — OFFICE VISIT (OUTPATIENT)
Dept: FAMILY MEDICINE CLINIC | Facility: CLINIC | Age: 57
End: 2022-02-24

## 2022-02-24 VITALS
SYSTOLIC BLOOD PRESSURE: 118 MMHG | HEART RATE: 101 BPM | HEIGHT: 67 IN | RESPIRATION RATE: 16 BRPM | WEIGHT: 185 LBS | OXYGEN SATURATION: 92 % | DIASTOLIC BLOOD PRESSURE: 84 MMHG | BODY MASS INDEX: 29.03 KG/M2

## 2022-02-24 DIAGNOSIS — J38.02 BILATERAL VOCAL CORD PARESIS: ICD-10-CM

## 2022-02-24 DIAGNOSIS — J44.1 COPD EXACERBATION: ICD-10-CM

## 2022-02-24 DIAGNOSIS — I10 PRIMARY HYPERTENSION: ICD-10-CM

## 2022-02-24 DIAGNOSIS — R06.2 WHEEZING: ICD-10-CM

## 2022-02-24 DIAGNOSIS — Z86.16 HISTORY OF COVID-19: ICD-10-CM

## 2022-02-24 DIAGNOSIS — J44.9 CHRONIC OBSTRUCTIVE PULMONARY DISEASE, UNSPECIFIED COPD TYPE: ICD-10-CM

## 2022-02-24 DIAGNOSIS — Z71.85 VACCINE COUNSELING: ICD-10-CM

## 2022-02-24 PROBLEM — Z87.891 HISTORY OF TOBACCO USE: Status: ACTIVE | Noted: 2019-01-31

## 2022-02-24 PROCEDURE — 99214 OFFICE O/P EST MOD 30 MIN: CPT | Performed by: FAMILY MEDICINE

## 2022-02-24 RX ORDER — METHYLPREDNISOLONE 4 MG/1
TABLET ORAL
Qty: 1 EACH | Refills: 0 | Status: SHIPPED | OUTPATIENT
Start: 2022-02-24 | End: 2022-04-08

## 2022-02-24 RX ORDER — BUDESONIDE, GLYCOPYRROLATE, AND FORMOTEROL FUMARATE 160; 9; 4.8 UG/1; UG/1; UG/1
2 AEROSOL, METERED RESPIRATORY (INHALATION) 2 TIMES DAILY
Qty: 1 EACH | Refills: 0 | COMMUNITY
Start: 2022-02-24 | End: 2022-03-28 | Stop reason: SDUPTHER

## 2022-02-24 NOTE — PATIENT INSTRUCTIONS
When using steroids  A. Do not use anti-inflammatories such as Motrin/ibuprofen, Alleve/naprosyn, Mobic and like medications  B. Stay on your stomach medicines for ulcer/like (like prilosec, protonix, nexium)  C. If you are diabetic or pre-diabetic; it will raise your blood sugar.  For most people this will be a minimal and very tolerated elevation.  If you check your blood sugars (or have the ability to check) you should consider for the first days of the steroid Rx to check your blood sugar more often (1-3/days).  Call if you see blood sugars over 350.  Being more strict on your diabetic diet while using the steroid will help.     If using insulin: add sliding scale to your program:     Based on BS give this much insulin EXTRA to any other insulin/diabetic medication being used.     150-199 2 units  200-249 3 units  250-299 4 units  300-349 5 units  350-400 6 units  Greater 400 7 units

## 2022-02-24 NOTE — PROGRESS NOTES
Subjective   Raoul Gibbs is a 57 y.o. male presenting with chief complaint of:   Chief Complaint   Patient presents with   • Wheezing     productive cough       History of Present Illness :  Alone.  Here for primarily cough/wheeze.  Never been right since covid.  Was given oral steroids/abx after covid and briefly was better.  Still smoking.       Has multiple chronic problems to consider that might have a bearing on today's issues; not an interval appointment.       Chronic/acute problems reviewed today:   1. 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   2. Primary hypertension Chronic/stable. Stable here past/no recent home blood pressures.  No significant chest pain, SOB, LE edema, orthopnea, near syncope, dizziness/light headness.   Recent Vitals       1/21/2022 1/31/2022 2/24/2022       BP: 118/76 114/80 118/84     Pulse: 104 84 101     Temp: 98 °F (36.7 °C) 97.3 °F (36.3 °C) --     Weight: 82.1 kg (181 lb) 80.9 kg (178 lb 4.8 oz) 83.9 kg (185 lb)     BMI (Calculated): 28.3 27.9 29            3. Chronic obstructive pulmonary disease, unspecified COPD type (HCC) Chronic/stable mild occ cough, sob, wheeze.  Rx helps; on/off used using more rescue tx.   No smoking.       4. History of COVID-19   7.6.21 moderna  8.6.21 moderna  1.21.22 + covid  No infusion/no pills     5. Vaccine counseling Chronic/ongoing need to review pro/cons for various vaccinations based on age, health issues.  Needs vaccination today for: see below.     6. Wheezing on and off with #7. Not short of breath   7. COPD exacerbation (HCC) acute issue above; increasing cough wheeze without fever and minimal but some increased productive cough.     Has an/another acute issue today: .    The following portions of the patient's history were reviewed and updated as appropriate: allergies, current medications, past family history, past  medical history, past social history, past surgical history and problem list.      Current Outpatient Medications:   •  acetaminophen (TYLENOL) 500 MG tablet, Take 500 mg by mouth Every 6 (Six) Hours As Needed for Mild Pain ., Disp: , Rfl:   •  albuterol (PROVENTIL) (2.5 MG/3ML) 0.083% nebulizer solution, Take 2.5 mg by nebulization 3 (Three) Times a Day., Disp: 90 each, Rfl: 1  •  albuterol sulfate HFA (Ventolin HFA) 108 (90 Base) MCG/ACT inhaler, Inhale 2 puffs Every 4 (Four) Hours As Needed for Wheezing or Shortness of Air., Disp: 18 g, Rfl: 5  •  allopurinol (ZYLOPRIM) 100 MG tablet, Take 1 tablet by mouth Daily., Disp: 90 tablet, Rfl: 1  •  ALPRAZolam (Xanax) 0.5 MG tablet, Take 1 tablet by mouth 2 (Two) Times a Day As Needed for Anxiety., Disp: 30 tablet, Rfl: 0  •  budesonide-formoterol (SYMBICORT) 160-4.5 MCG/ACT inhaler, Inhale 2 puffs 2 (Two) Times a Day., Disp: 3 inhaler, Rfl: 2-not using  •  buPROPion XL (WELLBUTRIN XL) 150 MG 24 hr tablet, TAKE ONE (1) TABLET BY MOUTH DAILY., Disp: 30 tablet, Rfl: 5  •  busPIRone (BUSPAR) 15 MG tablet, TAKE 1/2 TABLET BY MOUTH TWICE DAILY, Disp: 30 tablet, Rfl: 2  •  gemfibrozil (LOPID) 600 MG tablet, TAKE ONE (1) TABLET BY MOUTH DAILY. NEEDS OFFICE APPT. , Disp: 90 tablet, Rfl: 2  •  hydroCHLOROthiazide (HYDRODIURIL) 25 MG tablet, TAKE ONE (1) TABLET BY MOUTH DAILY, Disp: 90 tablet, Rfl: 1  •  ibuprofen (ADVIL,MOTRIN) 600 MG tablet, Take 600 mg by mouth Every 6 (Six) Hours As Needed for Mild Pain ., Disp: , Rfl:   •  losartan (COZAAR) 25 MG tablet, TAKE ONE (1) TABLET BY MOUTH DAILY. , Disp: 90 tablet, Rfl: 2  •  pantoprazole (PROTONIX) 40 MG EC tablet, TAKE ONE (1) TABLET BY MOUTH DAILY, Disp: 90 tablet, Rfl: 1  •  tadalafil (Cialis) 20 MG tablet, Take 1 tablet by mouth Daily As Needed for Erectile Dysfunction., Disp: 30 tablet, Rfl: 1    No problems with medications.    Allergies   Allergen Reactions   • Adhesive Tape Rash     VERY SENSITIVE TO ADHESIVE    • Latex  Rash and Unknown - High Severity       Review of Systems  GENERAL:  Inactive/slower with limits, speed, stamina for age and UE/LE weakness. Sleep is ok; apnea denied. No fevers.  SKIN: No rash/skin lesion of concern.   ENDO:  No syncope, near or diaphoretic sweaty spells.  BS never an issue.  HEENT: No recent head injury; or headache.  No vision change.  No significant hearing loss.  Ears without pain/drainage.  No sore throat.  No significant nasal/sinus congestion/drainage. No epistaxis.  CHEST: No chest wall tenderness or mass.  Recent on/off cough, with occ wheeze.  No SOB; no hemoptysis.  CV: No exertional chest pain, palpitations, ankle edema.  GI: No dysphagia or heartburn.  No abdominal pain, diarrhea, constipation.  No rectal bleeding, or melena.  No colonoscopy.   :  Voids without dysuria, or incontinence to completion.  ORTHO: No painful/swollen joints but various on /off sore.  Same daily sore neck or back.  No acute neck or back pain without recent injury.  Inversion injury L ankle/above.   NEURO: No focal/significant weakness of extremities.  No dizziness.   Occ UE/LE numbness/paresthesias.   PSYCH: No memory loss.  Mood good; not that anxious, depressed but/and not suicidal.  Tries to tolerate stress .   Screening:  Mammogram: NA  Bone density: NA  Low dose CT chest: Tobacco-smoker/age 17/1ppd/dc 2015 (35):   1.19.22  IMPRESSION:  1. No suspicious pulmonary nodule.  2. Lung-RADS 1: Negative  No nodules and definitely benign nodules.  Continue annual screening with low-dose CT in 12 months.  GI:   Colon-p,hem/Dane/JAMES/2.17.20/5y  EGD-nl/JAMES/Dane/2.25.20  Prostate: offered 1.3.22/declined  Usual lab order  12m CBC, CMP, LIPID, TSH, fT4, Vit D, B12, folate, iron, PSAs, sed rate, CR-protein    Copy/paste function used for ROS/exam AND each area of these were reviewed, updated, confirmed and supplemented as needed.   Data reviewed:   Recent admit/ER/MD visits: lili visit  Last cardiac testing:   Echo:  none    Radiology considered:   CT Chest Low Dose Cancer Screening WO    Result Date: 1/19/2022  1. No suspicious pulmonary nodule. 2. Lung-RADS 1: Negative  No nodules and definitely benign nodules.  Continue annual screening with low-dose CT in 12 months.   ACR Lung-RADS version 1.1 Categories and Recommendations:  1: Negative -- Recommend continued annual screening with low-dose CT in 12 months.  2: Benign appearance or behavior. -- Recommend continued annual screening with low-dose CT in 12 months.  3: Probably benign -- Recommend low-dose CT in 6 months.  4A: Probably suspicious (5-15% of malignancy)--recommend low-dose CT in 3 months. PET/CT may be used when there is a a solid component of at least 8 mm.  4B: Suspicious (greater than 15% chance of malignancy)--options for additional evaluation include chest CT, PET/CT and/or tissue sampling depending on clinical morbidities. For new large nodules developing on an annual repeat screening CT, a one-month low-dose CT may be used to address potentially infectious or inflammatory conditions.  4X: Suspicious --category 3 or 4 nodules with additional features are imaging that increases the suspicion of malignancy. Recommendation same as 4B.  S: Other clinically significant or potentially clinically significant findings--recommendation depends on finding. This report was finalized on 01/19/2022 13:44 by Dr. Ania Kendrick MD.      Lab Results:  Results for orders placed or performed in visit on 01/21/22   Hemochromatosis Mutation    Specimen: Blood    Blood  Release to srini   Result Value Ref Range    Hemochromatosis Gene Comment        A1C:No results for input(s): HGBA1C in the last 66910 hours.  GLUCOSE:  Lab Results - Last 18 Months   Lab Units 01/03/22  0853 07/20/21  1015   GLUCOSE mg/dL 94 80     LIPID:  Lab Results - Last 18 Months   Lab Units 07/20/21  1015   CHOLESTEROL mg/dL 174   LDL CHOL mg/dL 100   HDL CHOL mg/dL 35*   TRIGLYCERIDES mg/dL 230*  "    PSA:  Lab Results - Last 18 Months   Lab Units 01/03/22  0853   PSA ng/mL 0.649     CBC:  Lab Results - Last 18 Months   Lab Units 01/03/22  0853 07/20/21  1015   WBC 10*3/mm3 7.03 5.82   HEMOGLOBIN g/dL 17.3 16.5   HEMATOCRIT % 51.4* 48.3   PLATELETS 10*3/mm3 251 239   IRON mcg/dL 99  --       BMP/CMP:  Lab Results - Last 18 Months   Lab Units 01/03/22  0853 07/20/21  1015   SODIUM mmol/L 138 138   POTASSIUM mmol/L 4.2 4.1   CHLORIDE mmol/L 98 96*   TOTAL CO2 mmol/L 34.0* 30.3*   GLUCOSE mg/dL 94 80   BUN mg/dL 15 16   CREATININE mg/dL 1.00 0.88   EGFR IF NONAFRICN AM mL/min/1.73 78 90   EGFR IF AFRICN AM mL/min/1.73 94 109   CALCIUM mg/dL 9.7 9.6     HEPATIC:  Lab Results - Last 18 Months   Lab Units 01/03/22  0853 07/20/21  1015   ALT (SGPT) U/L 32 16   AST (SGOT) U/L 20 15   ALK PHOS U/L 55 58     THYROID:  Lab Results - Last 18 Months   Lab Units 01/03/22  0853 07/20/21  1015   TSH uIU/mL 1.240 0.979   FREE T4 ng/dL 1.27  --        Objective   /84   Pulse 101   Resp 16   Ht 170.2 cm (67\")   Wt 83.9 kg (185 lb)   SpO2 92%   BMI 28.98 kg/m²   Body mass index is 28.98 kg/m².      Recent Vitals       1/21/2022 1/31/2022 2/24/2022       BP: 118/76 114/80 118/84     Pulse: 104 84 101     Temp: 98 °F (36.7 °C) 97.3 °F (36.3 °C) --     Weight: 82.1 kg (181 lb) 80.9 kg (178 lb 4.8 oz) 83.9 kg (185 lb)     BMI (Calculated): 28.3 27.9 29           Physical Exam  GENERAL:  Well nourished/developed in no acute distress.   SKIN: Turgor excellent, without wound, rash, lesion  HEENT: Normal cephalic without trauma.  Pupils equal round reactive to light. Extraocular motions full without nystagmus.   External canals nonobstructive nontender without reddness. Tymphatic membranes bernard with yazan structures intact.   Oral cavity without growths, exudates, and moist.  Posterior pharynx without mass, obstruction, redness.  No thyromegaly, mass, tenderness, lymphadenopathy and supple.  CV: Regular rhythm.  No murmur, " gallop,  edema.   CHEST: No chest wall tenderness or mass.   LUNGS: Symmetric motion with clear/decreased to auscultation.  No dullness to percussion  ABD: Soft, nontender without mass.   ORTHO: Symmetric extremities without swelling/point tenderness except L ankle swollen; sore anterior/lateral ankle to touch/movement which is full.   Other full gross range of motion.  Walking without assistive device.   NEURO: CN 2-12 grossly intact.  Symmetric facies and UE/LE.  1-2/5 strength throughout. 1/4 x bicep equal reflexes. Marked hand/foot muscle atrophy.  Nonfocal use extremities. Speech clear.   PSYCH: Oriented x 3.  Pleasant calm, well kept.  Purposeful/directed conservation with intact short/long gross memory.     Assessment/Plan     1. Jelani v cord paresis (resser) ([place])    2. Primary hypertension    3. Chronic obstructive pulmonary disease, unspecified COPD type (HCC)    4. History of COVID-19    5. Vaccine counseling    6. Wheezing    7. COPD exacerbation (HCC)      Discussion:  BP ok  Other vitals ok  DM/BS ok last  Thyroid ok last  Liver ok last  CBC ok last  Renal ok last  Lipid ok last  PSA ok last    Discussed benefit from chronic use Lama, Laba and steroid inhalation  Work with pharmacy to find formulary if needed as our desire is to keep him on tri-inhaler  Brief steroid to start lung improvement while inhaler kicks in  Rinse mouth after use.     Immunization History   Administered Date(s) Administered   • COVID-19 (MODERNA) 1st, 2nd, 3rd Dose Only 07/06/2021, 08/06/2021     Vaccine reviewed: today none-declines flu; later once over this covid booster  Screening reviewed/updated during    Medical decision issues:   Data review above:   Rx: reviewed and decisions:   Same Rx for now other than stopping any symbicort/advair.     Visit today involved chronic significant medical problems or differentials and/or intensive drug monitoring: ie potential to cause serious morbidity or death:   New Medications  Ordered This Visit   Medications   • Budeson-Glycopyrrol-Formoterol (Breztri Aerosphere) 160-9-4.8 MCG/ACT aerosol inhaler     Sig: Inhale 2 puffs 2 (Two) Times a Day.     Dispense:  1 each     Refill:  0   • methylPREDNISolone (MEDROL) 4 MG dose pack     Sig: Take as directed on package instructions.     Dispense:  1 each     Refill:  0     Pharmacist-tell patient When using steroids Do not use anti-inflammatories such as Motrin/ibuprofen, Alleve/naprosyn, Mobic and like medications     Orders placed:   LAB/Testing/Referrals: reviewed/orders:   Today:   No orders of the defined types were placed in this encounter.    Chronic/recurrent labs above or change to:   same     Health maintenance:   Body mass index is 28.98 kg/m².  Patient's Body mass index is 28.98 kg/m². indicating that he is overweight (BMI 25-29.9). Patient's (Body mass index is 28.98 kg/m².) indicates that they are overweight with health conditions that include COPD . Weight is unchanged. BMI is is above average; BMI management plan is completed. We discussed portion control and increasing exercise. .      Tobacco use reviewed:   Raoul Gibbs  reports that he quit smoking about 6 years ago. His smoking use included cigars. He quit smokeless tobacco use about 22 years ago..       Patient Instructions   When using steroids  A. Do not use anti-inflammatories such as Motrin/ibuprofen, Alleve/naprosyn, Mobic and like medications  B. Stay on your stomach medicines for ulcer/like (like prilosec, protonix, nexium)  C. If you are diabetic or pre-diabetic; it will raise your blood sugar.  For most people this will be a minimal and very tolerated elevation.  If you check your blood sugars (or have the ability to check) you should consider for the first days of the steroid Rx to check your blood sugar more often (1-3/days).  Call if you see blood sugars over 350.  Being more strict on your diabetic diet while using the steroid will help.     If using insulin:  add sliding scale to your program:     Based on BS give this much insulin EXTRA to any other insulin/diabetic medication being used.     150-199 2 units  200-249 3 units  250-299 4 units  300-349 5 units  350-400 6 units  Greater 400 7 units          Follow up: Return for lab;, Dr Jackson-, as planned;.  Future Appointments   Date Time Provider Department Center   7/5/2022  8:30 AM [facility]   7/7/2022  9:30 AM Nick Jackson MD [facility]

## 2022-03-28 ENCOUNTER — PATIENT MESSAGE (OUTPATIENT)
Dept: FAMILY MEDICINE CLINIC | Facility: CLINIC | Age: 57
End: 2022-03-28

## 2022-03-28 DIAGNOSIS — R06.2 WHEEZING: ICD-10-CM

## 2022-03-28 DIAGNOSIS — Z86.16 HISTORY OF COVID-19: ICD-10-CM

## 2022-03-28 DIAGNOSIS — J44.1 COPD EXACERBATION: ICD-10-CM

## 2022-03-28 RX ORDER — BUDESONIDE, GLYCOPYRROLATE, AND FORMOTEROL FUMARATE 160; 9; 4.8 UG/1; UG/1; UG/1
2 AEROSOL, METERED RESPIRATORY (INHALATION) 2 TIMES DAILY
Qty: 10.7 EACH | Refills: 5 | COMMUNITY
Start: 2022-03-28 | End: 2022-03-28

## 2022-03-28 RX ORDER — BUDESONIDE, GLYCOPYRROLATE, AND FORMOTEROL FUMARATE 160; 9; 4.8 UG/1; UG/1; UG/1
2 AEROSOL, METERED RESPIRATORY (INHALATION) 2 TIMES DAILY
Qty: 10.7 EACH | Refills: 5 | Status: SHIPPED | OUTPATIENT
Start: 2022-03-28 | End: 2022-11-29

## 2022-03-28 NOTE — TELEPHONE ENCOUNTER
Rx Refill Note  Requested Prescriptions     Pending Prescriptions Disp Refills   • Budeson-Glycopyrrol-Formoterol (Breztri Aerosphere) 160-9-4.8 MCG/ACT aerosol inhaler 10.7 each 5     Sig: Inhale 2 puffs 2 (Two) Times a Day.      Last office visit with prescribing clinician: 2/24/2022      Next office visit with prescribing clinician: 7/7/2022            Mitzi Floyd LPN  03/28/22, 15:21 CDT

## 2022-04-08 ENCOUNTER — OFFICE VISIT (OUTPATIENT)
Dept: FAMILY MEDICINE CLINIC | Facility: CLINIC | Age: 57
End: 2022-04-08

## 2022-04-08 VITALS
SYSTOLIC BLOOD PRESSURE: 126 MMHG | HEIGHT: 67 IN | TEMPERATURE: 98.3 F | OXYGEN SATURATION: 94 % | WEIGHT: 185 LBS | DIASTOLIC BLOOD PRESSURE: 84 MMHG | RESPIRATION RATE: 16 BRPM | BODY MASS INDEX: 29.03 KG/M2 | HEART RATE: 97 BPM

## 2022-04-08 DIAGNOSIS — J30.2 SEASONAL ALLERGIES: Primary | ICD-10-CM

## 2022-04-08 PROCEDURE — 99213 OFFICE O/P EST LOW 20 MIN: CPT | Performed by: NURSE PRACTITIONER

## 2022-04-08 RX ORDER — CETIRIZINE HYDROCHLORIDE 10 MG/1
10 TABLET ORAL DAILY
Qty: 30 TABLET | Refills: 1 | Status: SHIPPED | OUTPATIENT
Start: 2022-04-08 | End: 2022-08-31 | Stop reason: ALTCHOICE

## 2022-04-08 RX ORDER — PREDNISONE 10 MG/1
TABLET ORAL
Qty: 30 TABLET | Refills: 0 | Status: SHIPPED | OUTPATIENT
Start: 2022-04-08 | End: 2022-04-20

## 2022-04-08 NOTE — PROGRESS NOTES
Subjective   Chief Complaint:  Cough, congestion    History of Present Illness:  This 57 y.o. male was seen in the office today.  He reports cough triggered by drainage in the back of his throat.  Reports no body aches, fever chills or productive sputum.    Allergies   Allergen Reactions   • Adhesive Tape Rash     VERY SENSITIVE TO ADHESIVE    • Latex Rash and Unknown - High Severity      Current Outpatient Medications on File Prior to Visit   Medication Sig   • acetaminophen (TYLENOL) 500 MG tablet Take 500 mg by mouth Every 6 (Six) Hours As Needed for Mild Pain .   • albuterol sulfate HFA (Ventolin HFA) 108 (90 Base) MCG/ACT inhaler Inhale 2 puffs Every 4 (Four) Hours As Needed for Wheezing or Shortness of Air.   • allopurinol (ZYLOPRIM) 100 MG tablet Take 1 tablet by mouth Daily.   • ALPRAZolam (Xanax) 0.5 MG tablet Take 1 tablet by mouth 2 (Two) Times a Day As Needed for Anxiety.   • Budeson-Glycopyrrol-Formoterol (Breztri Aerosphere) 160-9-4.8 MCG/ACT aerosol inhaler Inhale 2 puffs 2 (Two) Times a Day.   • buPROPion XL (WELLBUTRIN XL) 150 MG 24 hr tablet TAKE ONE (1) TABLET BY MOUTH DAILY.   • busPIRone (BUSPAR) 15 MG tablet TAKE 1/2 TABLET BY MOUTH TWICE DAILY   • gemfibrozil (LOPID) 600 MG tablet TAKE ONE (1) TABLET BY MOUTH DAILY. NEEDS OFFICE APPT.    • hydroCHLOROthiazide (HYDRODIURIL) 25 MG tablet TAKE ONE (1) TABLET BY MOUTH DAILY   • ibuprofen (ADVIL,MOTRIN) 600 MG tablet Take 600 mg by mouth Every 6 (Six) Hours As Needed for Mild Pain .   • losartan (COZAAR) 25 MG tablet TAKE ONE (1) TABLET BY MOUTH DAILY.    • pantoprazole (PROTONIX) 40 MG EC tablet TAKE ONE (1) TABLET BY MOUTH DAILY   • tadalafil (Cialis) 20 MG tablet Take 1 tablet by mouth Daily As Needed for Erectile Dysfunction.   • [DISCONTINUED] methylPREDNISolone (MEDROL) 4 MG dose pack Take as directed on package instructions.   • albuterol (PROVENTIL) (2.5 MG/3ML) 0.083% nebulizer solution Take 2.5 mg by nebulization 3 (Three) Times a Day.      No current facility-administered medications on file prior to visit.      Past Medical, Surgical, Social, and Family History:  Past Medical History:   Diagnosis Date   • Anemia    • Arthritis    • Back pain    • Charcot-Claire-Tooth disease     DX AT 19    • Claustrophobia    • COPD (chronic obstructive pulmonary disease) (HCC)    • Elevated cholesterol    • Gait abnormality     DUE TO CMT    • GERD (gastroesophageal reflux disease)    • Hyperlipidemia    • Hypertension    • Irritable bowel    • Other diseases of vocal cords     NARROW VOICE BOX    • Sleep apnea     DOES NOT USE BIPAP OR CPAP      Past Surgical History:   Procedure Laterality Date   • BICEPS TENDONESIS SUBPECTORALIS REPAIR Right 2019    Procedure: BICEPS TENODESIS - RIGHT;  Surgeon: Yeison Agudelo MD;  Location: [facility] OR;  Service: Orthopedics   • COLONOSCOPY N/A 2020    Procedure: COLONOSCOPY WITH ANESTHESIA;  Surgeon: Cal Vela DO;  Location: [facility] ENDOSCOPY;  Service: Gastroenterology;  Laterality: N/A;  preop; blood in stool  postop; polyps   PCP anand Jackson    • ENDOSCOPY N/A 2020    Procedure: ESOPHAGOGASTRODUODENOSCOPY WITH ANESTHESIA;  Surgeon: Cal Vela DO;  Location: [facility] ENDOSCOPY;  Service: Gastroenterology;  Laterality: N/A;  preop; blood in stool  postop; normal   PCP Anand Jackson    • SHOULDER ARTHROSCOPY W/ ROTATOR CUFF REPAIR Right 2019    Procedure: RIGHT SHOULDER ARTHROSCOPIC ROTATOR CUFF REPAIR, SUBACROMIAL DECOMPRESSION;  Surgeon: Yeison Agudelo MD;  Location: [facility] OR;  Service: Orthopedics     Social History     Socioeconomic History   • Marital status: Single   • Years of education: 10   • Highest education level: GED or equivalent   Tobacco Use   • Smoking status: Former Smoker     Types: Cigars     Quit date: 10/2015     Years since quittin.5   • Smokeless tobacco: Former User     Quit date:    Substance and Sexual Activity   • Alcohol use: Yes     Comment: weekly   • Drug  "use: No   • Sexual activity: Yes     Partners: Female     Family History   Problem Relation Age of Onset   • Cancer Mother    • Diabetes Mother    • Colon polyps Mother    • Hyperlipidemia Father    • Hypertension Father    • Cancer Father    • Colon cancer Neg Hx      Objective   Physical Exam  Constitutional:       General: He is not in acute distress.  HENT:      Nose: Congestion and rhinorrhea present.      Mouth/Throat:      Pharynx: Oropharyngeal exudate (*Clear) present. No posterior oropharyngeal erythema.   Cardiovascular:      Rate and Rhythm: Normal rate and regular rhythm.      Pulses: Normal pulses.      Heart sounds: No murmur heard.    No friction rub. No gallop.   Pulmonary:      Effort: Pulmonary effort is normal. No respiratory distress.      Breath sounds: Normal breath sounds. No wheezing or rhonchi.   Neurological:      Mental Status: He is alert.     /84 (BP Location: Left arm)   Pulse 97   Temp 98.3 °F (36.8 °C)   Resp 16   Ht 170.2 cm (67\")   Wt 83.9 kg (185 lb)   SpO2 94%   BMI 28.98 kg/m²     Assessment/Plan   Diagnoses and all orders for this visit:    1. Seasonal allergies (Primary)  -     cetirizine (ZyrTEC Allergy) 10 MG tablet; Take 1 tablet by mouth Daily.  Dispense: 30 tablet; Refill: 1  -     predniSONE (DELTASONE) 10 MG tablet; Take 4 tablets by mouth Daily for 3 days, THEN 3 tablets Daily for 3 days, THEN 2 tablets Daily for 3 days, THEN 1 tablet Daily for 3 days.  Dispense: 30 tablet; Refill: 0    Discussion:  Advised and educated plan of care.      Follow-up:  Return if symptoms worsen or fail to improve.    Electronically signed by RUEL Lyman, 04/08/22, 3:27 PM CDT.  "

## 2022-04-22 ENCOUNTER — LAB (OUTPATIENT)
Dept: LAB | Facility: HOSPITAL | Age: 57
End: 2022-04-22

## 2022-04-22 ENCOUNTER — CONSULT (OUTPATIENT)
Dept: ONCOLOGY | Facility: CLINIC | Age: 57
End: 2022-04-22

## 2022-04-22 VITALS
RESPIRATION RATE: 16 BRPM | BODY MASS INDEX: 29.6 KG/M2 | DIASTOLIC BLOOD PRESSURE: 82 MMHG | HEART RATE: 97 BPM | WEIGHT: 188.6 LBS | SYSTOLIC BLOOD PRESSURE: 142 MMHG | TEMPERATURE: 98.5 F | OXYGEN SATURATION: 95 % | HEIGHT: 67 IN

## 2022-04-22 DIAGNOSIS — I10 HYPERTENSION, UNSPECIFIED TYPE: ICD-10-CM

## 2022-04-22 DIAGNOSIS — R79.89 ELEVATED FERRITIN: Primary | ICD-10-CM

## 2022-04-22 DIAGNOSIS — R79.89 ELEVATED FERRITIN: ICD-10-CM

## 2022-04-22 DIAGNOSIS — G60.0 CHARCOT-MARIE-TOOTH DISEASE: ICD-10-CM

## 2022-04-22 LAB
ALBUMIN SERPL-MCNC: 4.6 G/DL (ref 3.5–5.2)
ALBUMIN/GLOB SERPL: 2.3 G/DL
ALP SERPL-CCNC: 47 U/L (ref 39–117)
ALT SERPL W P-5'-P-CCNC: 24 U/L (ref 1–41)
ANION GAP SERPL CALCULATED.3IONS-SCNC: 8 MMOL/L (ref 5–15)
AST SERPL-CCNC: 19 U/L (ref 1–40)
BASOPHILS # BLD AUTO: 0.08 10*3/MM3 (ref 0–0.2)
BASOPHILS NFR BLD AUTO: 1.1 % (ref 0–1.5)
BILIRUB SERPL-MCNC: 0.4 MG/DL (ref 0–1.2)
BUN SERPL-MCNC: 19 MG/DL (ref 6–20)
BUN/CREAT SERPL: 22.4 (ref 7–25)
CALCIUM SPEC-SCNC: 9.6 MG/DL (ref 8.6–10.5)
CHLORIDE SERPL-SCNC: 101 MMOL/L (ref 98–107)
CO2 SERPL-SCNC: 31 MMOL/L (ref 22–29)
CREAT SERPL-MCNC: 0.85 MG/DL (ref 0.76–1.27)
DEPRECATED RDW RBC AUTO: 43.4 FL (ref 37–54)
EGFRCR SERPLBLD CKD-EPI 2021: 102.6 ML/MIN/1.73
EOSINOPHIL # BLD AUTO: 0.27 10*3/MM3 (ref 0–0.4)
EOSINOPHIL NFR BLD AUTO: 3.6 % (ref 0.3–6.2)
ERYTHROCYTE [DISTWIDTH] IN BLOOD BY AUTOMATED COUNT: 12.7 % (ref 12.3–15.4)
FERRITIN SERPL-MCNC: 496.3 NG/ML (ref 30–400)
GLOBULIN UR ELPH-MCNC: 2 GM/DL
GLUCOSE SERPL-MCNC: 112 MG/DL (ref 65–99)
HCT VFR BLD AUTO: 48.1 % (ref 37.5–51)
HGB BLD-MCNC: 16.4 G/DL (ref 13–17.7)
IMM GRANULOCYTES # BLD AUTO: 0.11 10*3/MM3 (ref 0–0.05)
IMM GRANULOCYTES NFR BLD AUTO: 1.5 % (ref 0–0.5)
IRON 24H UR-MRATE: 120 MCG/DL (ref 59–158)
IRON SATN MFR SERPL: 36 % (ref 20–50)
LYMPHOCYTES # BLD AUTO: 1.41 10*3/MM3 (ref 0.7–3.1)
LYMPHOCYTES NFR BLD AUTO: 18.7 % (ref 19.6–45.3)
MCH RBC QN AUTO: 31.5 PG (ref 26.6–33)
MCHC RBC AUTO-ENTMCNC: 34.1 G/DL (ref 31.5–35.7)
MCV RBC AUTO: 92.3 FL (ref 79–97)
MONOCYTES # BLD AUTO: 0.89 10*3/MM3 (ref 0.1–0.9)
MONOCYTES NFR BLD AUTO: 11.8 % (ref 5–12)
NEUTROPHILS NFR BLD AUTO: 4.8 10*3/MM3 (ref 1.7–7)
NEUTROPHILS NFR BLD AUTO: 63.3 % (ref 42.7–76)
NRBC BLD AUTO-RTO: 0 /100 WBC (ref 0–0.2)
PLATELET # BLD AUTO: 229 10*3/MM3 (ref 140–450)
PMV BLD AUTO: 9.7 FL (ref 6–12)
POTASSIUM SERPL-SCNC: 4.1 MMOL/L (ref 3.5–5.2)
PROT SERPL-MCNC: 6.6 G/DL (ref 6–8.5)
RBC # BLD AUTO: 5.21 10*6/MM3 (ref 4.14–5.8)
SODIUM SERPL-SCNC: 140 MMOL/L (ref 136–145)
TIBC SERPL-MCNC: 335 MCG/DL (ref 298–536)
TRANSFERRIN SERPL-MCNC: 225 MG/DL (ref 200–360)
WBC NRBC COR # BLD: 7.56 10*3/MM3 (ref 3.4–10.8)

## 2022-04-22 PROCEDURE — 36415 COLL VENOUS BLD VENIPUNCTURE: CPT

## 2022-04-22 PROCEDURE — 85025 COMPLETE CBC W/AUTO DIFF WBC: CPT

## 2022-04-22 PROCEDURE — 82728 ASSAY OF FERRITIN: CPT

## 2022-04-22 PROCEDURE — 84466 ASSAY OF TRANSFERRIN: CPT

## 2022-04-22 PROCEDURE — 83540 ASSAY OF IRON: CPT

## 2022-04-22 PROCEDURE — 82157 ASSAY OF ANDROSTENEDIONE: CPT

## 2022-04-22 PROCEDURE — 80053 COMPREHEN METABOLIC PANEL: CPT

## 2022-04-22 PROCEDURE — 99214 OFFICE O/P EST MOD 30 MIN: CPT | Performed by: NURSE PRACTITIONER

## 2022-04-22 NOTE — PROGRESS NOTES
"[facility] HEMATOLOGY & ONCOLOGY  [address]  [place] [address] [phone]    Patient Name: Iam Gibbs  Encounter Date: 04/22/2022  YOB: 1965  Patient Number: [id number]    Initial N.elote    REASON FOR CONSULTATION: Patient states \" my iron is high \".    HISTORY OF PRESENT ILLNESS: Iam Gibbs is a 57 y.o. male referred by Nick Jackson MD for diagnostic and management recommendations for elevated ferritin. History is obtained from patient. History is considered to be accurate.    He has health history significant for HTN, GERD, Anxiety, Gout, erectile dysfunction., Carcot Claire Tooth, Soinal Stenosis, and COPD.  He quit smoking approx 6 years ago and drinks approx 12 beers / week.  He does note increased fatigue but has attributed it to seasonal affective disorder.   Joint pain and muscles weakness but of note, pt has CMT.      He was seen on 01/31/2022 to follow up with Dr. Jackson on labs that were drawn on 01/03/22. These labs showed a Ferritin level of 706.  Hemochromotosis genes were checked and results were compound heterozygous with one copy of C282Y and one copy of S65C.  Results for H63D were negative.  His LFT have been normal although he does drink beer regularly.  Low dose CT of chest on 01/03/22 showed no acute processes in the upper abdomen.       LABS    Lab Results - Last 18 Months   Lab Units 04/22/22  1008 01/03/22  0853 07/20/21  1015   HEMOGLOBIN g/dL 16.4 17.3 16.5   HEMATOCRIT % 48.1 51.4* 48.3   MCV fL 92.3 91.0 94.3   WBC 10*3/mm3 7.56 7.03 5.82   RDW % 12.7 12.5 12.6   MPV fL 9.7  --   --    PLATELETS 10*3/mm3 229 251 239   IMM GRAN % % 1.5*  --   --    NEUTROS ABS 10*3/mm3 4.80 4.71 3.40   LYMPHS ABS 10*3/mm3 1.41 1.15 1.18   MONOS ABS 10*3/mm3 0.89 0.72 0.76   EOS ABS 10*3/mm3 0.27 0.32 0.38   BASOS ABS 10*3/mm3 0.08 0.07 0.08   IMMATURE GRANS (ABS) 10*3/mm3 0.11*  --   -- "    NRBC /100 WBC 0.0 0.0 0.0       Lab Results - Last 18 Months   Lab Units 04/22/22  1008 01/03/22  0853 07/20/21  1015   GLUCOSE mg/dL 112* 94 80   SODIUM mmol/L 140 138 138   POTASSIUM mmol/L 4.1 4.2 4.1   TOTAL CO2 mmol/L  --  34.0* 30.3*   CO2 mmol/L 31.0*  --   --    CHLORIDE mmol/L 101 98 96*   ANION GAP mmol/L 8.0  --   --    CREATININE mg/dL 0.85 1.00 0.88   BUN mg/dL 19 15 16   BUN / CREAT RATIO  22.4 15.0 18.2   CALCIUM mg/dL 9.6 9.7 9.6   EGFR IF NONAFRICN AM mL/min/1.73  --  78 90   ALK PHOS U/L 47 55 58   TOTAL PROTEIN g/dL 6.6  --   --    ALT (SGPT) U/L 24 32 16   AST (SGOT) U/L 19 20 15   BILIRUBIN mg/dL 0.4 0.5 0.7   ALBUMIN g/dL 4.60 5.10 4.80   GLOBULIN gm/dL 2.0  --   --        Lab Results - Last 18 Months   Lab Units 01/03/22  0853   URIC ACID mg/dL 7.2*       Lab Results - Last 18 Months   Lab Units 04/22/22  1008 01/03/22  0853 07/20/21  1015   IRON mcg/dL 120  --   --    TIBC mcg/dL 335 393  --    IRON SATURATION % 36 25  --    FERRITIN ng/mL 496.30* 706.00*  --    TSH uIU/mL  --  1.240 0.979         PAST MEDICAL HISTORY:  ALLERGIES:  Allergies   Allergen Reactions   • Adhesive Tape Rash     VERY SENSITIVE TO ADHESIVE    • Latex Rash and Unknown - High Severity     CURRENT MEDICATIONS:  Outpatient Encounter Medications as of 4/22/2022   Medication Sig Dispense Refill   • acetaminophen (TYLENOL) 500 MG tablet Take 500 mg by mouth Every 6 (Six) Hours As Needed for Mild Pain .     • albuterol (PROVENTIL) (2.5 MG/3ML) 0.083% nebulizer solution Take 2.5 mg by nebulization 3 (Three) Times a Day. 90 each 1   • albuterol sulfate HFA (Ventolin HFA) 108 (90 Base) MCG/ACT inhaler Inhale 2 puffs Every 4 (Four) Hours As Needed for Wheezing or Shortness of Air. 18 g 5   • allopurinol (ZYLOPRIM) 100 MG tablet Take 1 tablet by mouth Daily. 90 tablet 1   • ALPRAZolam (Xanax) 0.5 MG tablet Take 1 tablet by mouth 2 (Two) Times a Day As Needed for Anxiety. 30 tablet 0   • Budeson-Glycopyrrol-Formoterol (Breztri  Aerosphere) 160-9-4.8 MCG/ACT aerosol inhaler Inhale 2 puffs 2 (Two) Times a Day. 10.7 each 5   • buPROPion XL (WELLBUTRIN XL) 150 MG 24 hr tablet TAKE ONE (1) TABLET BY MOUTH DAILY. 30 tablet 5   • busPIRone (BUSPAR) 15 MG tablet TAKE 1/2 TABLET BY MOUTH TWICE DAILY 30 tablet 2   • cetirizine (ZyrTEC Allergy) 10 MG tablet Take 1 tablet by mouth Daily. 30 tablet 1   • gemfibrozil (LOPID) 600 MG tablet TAKE ONE (1) TABLET BY MOUTH DAILY. NEEDS OFFICE APPT.  90 tablet 2   • hydroCHLOROthiazide (HYDRODIURIL) 25 MG tablet TAKE ONE (1) TABLET BY MOUTH DAILY 90 tablet 1   • ibuprofen (ADVIL,MOTRIN) 600 MG tablet Take 600 mg by mouth Every 6 (Six) Hours As Needed for Mild Pain .     • losartan (COZAAR) 25 MG tablet TAKE ONE (1) TABLET BY MOUTH DAILY.  90 tablet 2   • pantoprazole (PROTONIX) 40 MG EC tablet TAKE ONE (1) TABLET BY MOUTH DAILY 90 tablet 1   • tadalafil (Cialis) 20 MG tablet Take 1 tablet by mouth Daily As Needed for Erectile Dysfunction. 30 tablet 1     No facility-administered encounter medications on file as of 4/22/2022.     ADULT ILLNESSES:  Patient Active Problem List   Diagnosis Code   • Stridor-infrequent R06.1   • Jelani v cord paresis (resser) ([place]) J38.02   • Charcot-Claire-Tooth disease G60.0   • COPD: (BH/pulmonary) J44.9   • Gastroesophageal reflux disease K21.9   • History of tobacco use: 1.22/12m Z87.891   • BMI 28.0-28.9,adult Z68.28   • Chronic neck pain M54.2, G89.29   • Spinal stenosis in cervical region M48.02   • Cervical radiculopathy M54.12   • Chronic right shoulder pain M25.511, G89.29   • Wellness examination Z00.00   • Gout M10.9   • History of chronic respiratory failure Z87.09   • Gait difficulty R26.9   • Laboratory test Z01.89   • Hypertriglyceridemia E78.1   • Low iron E61.1   • Hyperuricemia-zyloprim E79.0   • Heme positive stool R19.5   • Obstructive sleep apnea (cpap) G47.33   • Hypertension I10   • High serum ferritin-see hemachromatosis R79.89   • History of COVID-19  Z86.16   • Carrier of hemochromatosis HFE gene mutation (offer referral) Z14.8     SURGERIES:  Past Surgical History:   Procedure Laterality Date   • BICEPS TENDONESIS SUBPECTORALIS REPAIR Right 6/28/2019    Procedure: BICEPS TENODESIS - RIGHT;  Surgeon: Yeison Agudelo MD;  Location: [facility] OR;  Service: Orthopedics   • COLONOSCOPY N/A 2/17/2020    Procedure: COLONOSCOPY WITH ANESTHESIA;  Surgeon: Cal Vela DO;  Location: [facility] ENDOSCOPY;  Service: Gastroenterology;  Laterality: N/A;  preop; blood in stool  postop; polyps   PCP anand Jackson    • ENDOSCOPY N/A 2/25/2020    Procedure: ESOPHAGOGASTRODUODENOSCOPY WITH ANESTHESIA;  Surgeon: Cal Vela DO;  Location: [facility] ENDOSCOPY;  Service: Gastroenterology;  Laterality: N/A;  preop; blood in stool  postop; normal   PCP Anand Jackson    • SHOULDER ARTHROSCOPY W/ ROTATOR CUFF REPAIR Right 6/28/2019    Procedure: RIGHT SHOULDER ARTHROSCOPIC ROTATOR CUFF REPAIR, SUBACROMIAL DECOMPRESSION;  Surgeon: Yeison Agudelo MD;  Location: [facility] OR;  Service: Orthopedics     HEALTH MAINTENANCE ITEMS:  Health Maintenance Due   Topic Date Due   • Pneumococcal Vaccine 0-64 (1 - PCV) Never done   • TDAP/TD VACCINES (1 - Tdap) Never done   • ZOSTER VACCINE (1 of 2) Never done   • ANNUAL WELLNESS VISIT  Never done   • COVID-19 Vaccine (3 - Booster for Moderna series) 01/06/2022       <no information>  Last Completed Colonoscopy          COLORECTAL CANCER SCREENING (COLONOSCOPY - Every 5 Years) Next due on 2/17/2025 02/17/2020  COLONOSCOPY    02/17/2020  Surgical Procedure: COLONOSCOPY    01/27/2020  POCT Occult blood x 3, stool              Immunization History   Administered Date(s) Administered   • COVID-19 (MODERNA) 1st, 2nd, 3rd Dose Only 07/06/2021, 08/06/2021     Last Completed Mammogram     This patient has no relevant Health Maintenance data.            FAMILY HISTORY:  Family History   Problem Relation Age of Onset   • Cancer Mother    • Diabetes Mother    •  Colon polyps Mother    • Hyperlipidemia Father    • Hypertension Father    • Cancer Father    • Colon cancer Neg Hx      SOCIAL HISTORY:  Social History     Socioeconomic History   • Marital status: Single   • Years of education: 10   • Highest education level: GED or equivalent   Tobacco Use   • Smoking status: Former Smoker     Types: Cigars     Quit date: 10/2015     Years since quittin.5   • Smokeless tobacco: Former User     Quit date:    Vaping Use   • Vaping Use: Never used   Substance and Sexual Activity   • Alcohol use: Yes     Alcohol/week: 3.0 standard drinks     Types: 3 Cans of beer per week     Comment: weekly   • Drug use: Yes     Types: Marijuana   • Sexual activity: Yes     Partners: Female       REVIEW OF SYSTEMS:  Review of Systems   Constitutional: Negative for activity change, appetite change, chills, diaphoresis, fatigue, fever, unexpected weight gain and unexpected weight loss.   HENT: Positive for sinus pressure (Increased Drainage since Covid). Negative for congestion, dental problem, drooling, ear discharge, ear pain, facial swelling, hearing loss, mouth sores, nosebleeds, postnasal drip, rhinorrhea, sneezing, sore throat, swollen glands, tinnitus, trouble swallowing and voice change.    Eyes: Negative for blurred vision, double vision, photophobia, pain, discharge, redness, itching and visual disturbance.        Has had multiple surgeries / cataracts, detatched retina      Respiratory: Positive for apnea and shortness of breath. Negative for cough, choking, chest tightness, wheezing and stridor.    Cardiovascular: Positive for chest pain. Negative for palpitations and leg swelling.   Gastrointestinal: Negative for abdominal distention, abdominal pain, anal bleeding, blood in stool, constipation, diarrhea, nausea, rectal pain, vomiting, GERD and indigestion.   Endocrine: Negative for cold intolerance, heat intolerance, polydipsia, polyphagia and polyuria.   Genitourinary: Positive  "for erectile dysfunction (history of but states it has improved ). Negative for breast discharge, decreased libido, decreased urine volume, difficulty urinating, discharge, dysuria, flank pain, frequency, genital sores, hematuria, nocturia, penile pain, penile swelling, scrotal swelling, testicular pain, urgency and urinary incontinence.   Musculoskeletal: Positive for arthralgias, back pain, gait problem (Shuffles r/t CMT) and myalgias. Negative for joint swelling, neck pain, neck stiffness and bursitis.   Skin: Negative for color change, dry skin, pallor, rash, skin lesions, wound and bruise.   Neurological: Negative for dizziness, tremors, seizures, syncope, facial asymmetry, speech difficulty, weakness (CMT), light-headedness, numbness, headache, memory problem and confusion.   Hematological: Negative for adenopathy. Does not bruise/bleed easily.   Psychiatric/Behavioral: Positive for depressed mood. Negative for agitation, behavioral problems, decreased concentration, dysphoric mood, hallucinations, self-injury, sleep disturbance, suicidal ideas, negative for hyperactivity and stress. The patient is nervous/anxious.        /82   Pulse 97   Temp 98.5 °F (36.9 °C) (Temporal)   Resp 16   Ht 170.2 cm (67\")   Wt 85.5 kg (188 lb 9.6 oz)   SpO2 95%   BMI 29.54 kg/m²  Body surface area is 1.97 meters squared.    Pain Score    04/22/22 0915   PainSc: 0-No pain       Physical Exam:  Physical Exam  Vitals reviewed.   Constitutional:       Appearance: Normal appearance.   HENT:      Head: Normocephalic and atraumatic.   Cardiovascular:      Rate and Rhythm: Normal rate and regular rhythm.   Pulmonary:      Breath sounds: Normal breath sounds.   Abdominal:      General: Abdomen is flat.      Palpations: Abdomen is soft.   Musculoskeletal:      Cervical back: Normal range of motion.      Comments: Weakness r/t  Charcot-Claire-Tooth disease        Skin:     General: Skin is warm and dry.   Neurological:      " General: No focal deficit present.      Mental Status: He is alert and oriented to person, place, and time.   Psychiatric:         Mood and Affect: Mood normal.         Behavior: Behavior normal.         Judgment: Judgment normal.         Iam Gibbs reports a pain score of 0. No intervention is indicated.    ASSESSMENT / PLAN:    1. Elevated ferritin    2. Charcot-Claire-Tooth disease    3. Hypertension, unspecified type       Assessment:       Patient has compound heterozygous hemochromatosis with one copy of C282Y and one copy of S65C.     PLAN:   1.   regarding the reason for the referral.   2.   regarding hemochromotosis and differential diagnosis.     3.  Recheck labs anemia profile, CBC, CMP and androgens.  Repeat hemochromatosis profile is a duplicate.   4.  Discussed with patient that the treatment for hemochromotois is therapeutic phlebotomy.     6.  Continue current medications.   7.  Continue care per primary care physician and other specialists.   8.  Care discussed with patient.  Understanding expressed.  Patient agreeable with plan.   9.  Return to office next week to review labs.   Will consider phlebotomy treatment at thi visit if ferritin remains significantly elevated.    10.  Further recommendations pending.      Thank you for the referral.    I spent 45 minutes caring for Iam on this date of service. This time includes time spent by me in the following activities: preparing for the visit, reviewing tests, obtaining and/or reviewing a separately obtained history, performing a medically appropriate examination and/or evaluation, counseling and educating the patient/family/caregiver, ordering medications, tests, or procedures and documenting information in the medical record.

## 2022-04-26 ENCOUNTER — OFFICE VISIT (OUTPATIENT)
Dept: FAMILY MEDICINE CLINIC | Facility: CLINIC | Age: 57
End: 2022-04-26

## 2022-04-26 VITALS
DIASTOLIC BLOOD PRESSURE: 94 MMHG | OXYGEN SATURATION: 96 % | HEART RATE: 87 BPM | TEMPERATURE: 97.3 F | SYSTOLIC BLOOD PRESSURE: 140 MMHG | RESPIRATION RATE: 16 BRPM | HEIGHT: 67 IN | WEIGHT: 188 LBS | BODY MASS INDEX: 29.51 KG/M2

## 2022-04-26 DIAGNOSIS — J02.9 ACUTE PHARYNGITIS, UNSPECIFIED ETIOLOGY: Primary | ICD-10-CM

## 2022-04-26 PROCEDURE — 99213 OFFICE O/P EST LOW 20 MIN: CPT | Performed by: NURSE PRACTITIONER

## 2022-04-26 PROCEDURE — 96372 THER/PROPH/DIAG INJ SC/IM: CPT | Performed by: NURSE PRACTITIONER

## 2022-04-26 RX ORDER — AMOXICILLIN 500 MG/1
500 TABLET, FILM COATED ORAL 3 TIMES DAILY
Qty: 30 TABLET | Refills: 0 | Status: SHIPPED | OUTPATIENT
Start: 2022-04-26 | End: 2022-05-06

## 2022-04-26 RX ORDER — DEXAMETHASONE SODIUM PHOSPHATE 4 MG/ML
8 INJECTION, SOLUTION INTRA-ARTICULAR; INTRALESIONAL; INTRAMUSCULAR; INTRAVENOUS; SOFT TISSUE ONCE
Status: COMPLETED | OUTPATIENT
Start: 2022-04-26 | End: 2022-04-26

## 2022-04-26 RX ADMIN — DEXAMETHASONE SODIUM PHOSPHATE 8 MG: 4 INJECTION, SOLUTION INTRA-ARTICULAR; INTRALESIONAL; INTRAMUSCULAR; INTRAVENOUS; SOFT TISSUE at 13:28

## 2022-04-26 NOTE — PROGRESS NOTES
Subjective   Chief Complaint:  Sore throat    History of Present Illness:  This 57 y.o. male was seen in the office today.  Reports sore throat x2 days.  Reports some congestion in the ear, otherwise no cough, fever chills night sweats flulike symptoms.    Allergies   Allergen Reactions   • Adhesive Tape Rash     VERY SENSITIVE TO ADHESIVE    • Latex Rash and Unknown - High Severity      Current Outpatient Medications on File Prior to Visit   Medication Sig   • acetaminophen (TYLENOL) 500 MG tablet Take 500 mg by mouth Every 6 (Six) Hours As Needed for Mild Pain .   • albuterol (PROVENTIL) (2.5 MG/3ML) 0.083% nebulizer solution Take 2.5 mg by nebulization 3 (Three) Times a Day.   • albuterol sulfate HFA (Ventolin HFA) 108 (90 Base) MCG/ACT inhaler Inhale 2 puffs Every 4 (Four) Hours As Needed for Wheezing or Shortness of Air.   • allopurinol (ZYLOPRIM) 100 MG tablet Take 1 tablet by mouth Daily.   • ALPRAZolam (Xanax) 0.5 MG tablet Take 1 tablet by mouth 2 (Two) Times a Day As Needed for Anxiety.   • Budeson-Glycopyrrol-Formoterol (Breztri Aerosphere) 160-9-4.8 MCG/ACT aerosol inhaler Inhale 2 puffs 2 (Two) Times a Day.   • buPROPion XL (WELLBUTRIN XL) 150 MG 24 hr tablet TAKE ONE (1) TABLET BY MOUTH DAILY.   • busPIRone (BUSPAR) 15 MG tablet TAKE 1/2 TABLET BY MOUTH TWICE DAILY   • cetirizine (ZyrTEC Allergy) 10 MG tablet Take 1 tablet by mouth Daily.   • gemfibrozil (LOPID) 600 MG tablet TAKE ONE (1) TABLET BY MOUTH DAILY. NEEDS OFFICE APPT.    • hydroCHLOROthiazide (HYDRODIURIL) 25 MG tablet TAKE ONE (1) TABLET BY MOUTH DAILY   • ibuprofen (ADVIL,MOTRIN) 600 MG tablet Take 600 mg by mouth Every 6 (Six) Hours As Needed for Mild Pain .   • losartan (COZAAR) 25 MG tablet TAKE ONE (1) TABLET BY MOUTH DAILY.    • pantoprazole (PROTONIX) 40 MG EC tablet TAKE ONE (1) TABLET BY MOUTH DAILY   • tadalafil (Cialis) 20 MG tablet Take 1 tablet by mouth Daily As Needed for Erectile Dysfunction.     No current  facility-administered medications on file prior to visit.      Past Medical, Surgical, Social, and Family History:  Past Medical History:   Diagnosis Date   • Anemia    • Arthritis    • Back pain    • Charcot-Claire-Tooth disease     DX AT 19    • Claustrophobia    • COPD (chronic obstructive pulmonary disease) (HCC)    • Elevated cholesterol    • Gait abnormality     DUE TO CMT    • GERD (gastroesophageal reflux disease)    • Hyperlipidemia    • Hypertension    • Irritable bowel    • Other diseases of vocal cords     NARROW VOICE BOX    • Sleep apnea     DOES NOT USE BIPAP OR CPAP      Past Surgical History:   Procedure Laterality Date   • BICEPS TENDONESIS SUBPECTORALIS REPAIR Right 2019    Procedure: BICEPS TENODESIS - RIGHT;  Surgeon: Yeison Agudelo MD;  Location: [facility] OR;  Service: Orthopedics   • COLONOSCOPY N/A 2020    Procedure: COLONOSCOPY WITH ANESTHESIA;  Surgeon: Cal Vela DO;  Location: [facility] ENDOSCOPY;  Service: Gastroenterology;  Laterality: N/A;  preop; blood in stool  postop; polyps   PCP anand Jackson    • ENDOSCOPY N/A 2020    Procedure: ESOPHAGOGASTRODUODENOSCOPY WITH ANESTHESIA;  Surgeon: Cal Vela DO;  Location: [facility] ENDOSCOPY;  Service: Gastroenterology;  Laterality: N/A;  preop; blood in stool  postop; normal   PCP Anand Jackson    • SHOULDER ARTHROSCOPY W/ ROTATOR CUFF REPAIR Right 2019    Procedure: RIGHT SHOULDER ARTHROSCOPIC ROTATOR CUFF REPAIR, SUBACROMIAL DECOMPRESSION;  Surgeon: Yeison Agudelo MD;  Location: [facility] OR;  Service: Orthopedics     Social History     Socioeconomic History   • Marital status: Single   • Years of education: 10   • Highest education level: GED or equivalent   Tobacco Use   • Smoking status: Former Smoker     Types: Cigars     Quit date: 10/2015     Years since quittin.5   • Smokeless tobacco: Former User     Quit date:    Vaping Use   • Vaping Use: Never used   Substance and Sexual Activity   • Alcohol use: Yes      "Alcohol/week: 3.0 standard drinks     Types: 3 Cans of beer per week     Comment: weekly   • Drug use: Yes     Types: Marijuana   • Sexual activity: Yes     Partners: Female     Family History   Problem Relation Age of Onset   • Cancer Mother    • Diabetes Mother    • Colon polyps Mother    • Hyperlipidemia Father    • Hypertension Father    • Cancer Father    • Colon cancer Neg Hx      Objective   Physical Exam  Constitutional:       General: He is not in acute distress.  HENT:      Mouth/Throat:      Pharynx: Oropharyngeal exudate and posterior oropharyngeal erythema present.   Cardiovascular:      Rate and Rhythm: Normal rate and regular rhythm.      Pulses: Normal pulses.      Heart sounds: No murmur heard.    No friction rub. No gallop.   Pulmonary:      Effort: Pulmonary effort is normal. No respiratory distress.      Breath sounds: Normal breath sounds. No wheezing or rhonchi.   Lymphadenopathy:      Cervical: Cervical adenopathy (*Anterior bilateral) present.   Neurological:      Mental Status: He is alert.     /94 (BP Location: Left arm)   Pulse 87   Temp 97.3 °F (36.3 °C)   Resp 16   Ht 170.2 cm (67\")   Wt 85.3 kg (188 lb)   SpO2 96%   BMI 29.44 kg/m²     Assessment/Plan   Diagnoses and all orders for this visit:    1. Acute pharyngitis, unspecified etiology (Primary)  -     amoxicillin (AMOXIL) 500 MG tablet; Take 1 tablet by mouth 3 (Three) Times a Day for 10 days.  Dispense: 30 tablet; Refill: 0  -     dexamethasone (DECADRON) injection 8 mg    Discussion:  Advised and educated plan of care.      Follow-up:  Return if symptoms worsen or fail to improve.    Electronically signed by RUEL Lyman, 04/26/22, 1:23 PM CDT.  "

## 2022-04-28 ENCOUNTER — OFFICE VISIT (OUTPATIENT)
Dept: ONCOLOGY | Facility: CLINIC | Age: 57
End: 2022-04-28

## 2022-04-28 ENCOUNTER — PATIENT ROUNDING (BHMG ONLY) (OUTPATIENT)
Dept: ONCOLOGY | Facility: CLINIC | Age: 57
End: 2022-04-28

## 2022-04-28 VITALS
RESPIRATION RATE: 16 BRPM | HEIGHT: 67 IN | WEIGHT: 186.2 LBS | OXYGEN SATURATION: 96 % | SYSTOLIC BLOOD PRESSURE: 122 MMHG | TEMPERATURE: 97.9 F | DIASTOLIC BLOOD PRESSURE: 82 MMHG | HEART RATE: 80 BPM | BODY MASS INDEX: 29.22 KG/M2

## 2022-04-28 DIAGNOSIS — E83.110 HEMOCHROMATOSIS ASSOCIATED WITH COMPOUND HETEROZYGOUS MUTATION IN HFE GENE: ICD-10-CM

## 2022-04-28 DIAGNOSIS — G60.0 CHARCOT-MARIE-TOOTH DISEASE: Primary | ICD-10-CM

## 2022-04-28 LAB — HFE GENE MUT ANL BLD/T: NORMAL

## 2022-04-28 PROCEDURE — 99214 OFFICE O/P EST MOD 30 MIN: CPT | Performed by: NURSE PRACTITIONER

## 2022-04-28 NOTE — PROGRESS NOTES
[facility] HEMATOLOGY & ONCOLOGY  [address]  [place] [address] [phone]    Patient Name: Iam Gibbs  Encounter Date: 04/28/2022  YOB: 1965  Patient Number: [id number]    Hematology / Oncology Progress Note    CHIEF COMPLAINT:      HPI / REASON FOR VISIT: Iam Gibbs is a 57 y.o. male who is followed by this office for elevated iron    He has health history significant for HTN, GERD, Anxiety, Gout, erectile dysfunction., Carcot Claire Tooth, Soinal Stenosis, and COPD.  He quit smoking approx 6 years ago and drinks approx 12 beers / week.  He does note increased fatigue but has attributed it to seasonal affective disorder.   Joint pain and muscles weakness but of note, pt has CMT.       He was seen on 01/31/2022 to follow up with Dr. Jackson on labs that were drawn on 01/03/22. These labs showed a Ferritin level of 706.  Hemochromotosis genes were checked and results were compound heterozygous with one copy of C282Y and one copy of S65C.  Results for H63D were negative.  His LFT have been normal although he does drink beer regularly.  Low dose CT of chest on 01/03/22 showed no acute processes in the upper abdomen.      He was seen seen in our office for consultation on 04/22/22.   Labs were drawn.  His Hemochromatosis was compound heterozygous with one copy of C282Y and one copy of S65C.  Ferritin was improved to 496.    LABS    Lab Results - Last 18 Months   Lab Units 04/22/22  1008 01/03/22  0853 07/20/21  1015   HEMOGLOBIN g/dL 16.4 17.3 16.5   HEMATOCRIT % 48.1 51.4* 48.3   MCV fL 92.3 91.0 94.3   WBC 10*3/mm3 7.56 7.03 5.82   RDW % 12.7 12.5 12.6   MPV fL 9.7  --   --    PLATELETS 10*3/mm3 229 251 239   IMM GRAN % % 1.5*  --   --    NEUTROS ABS 10*3/mm3 4.80 4.71 3.40   LYMPHS ABS 10*3/mm3 1.41 1.15 1.18   MONOS ABS 10*3/mm3 0.89 0.72 0.76   EOS ABS 10*3/mm3 0.27 0.32 0.38   BASOS ABS 10*3/mm3 0.08 0.07  0.08   IMMATURE GRANS (ABS) 10*3/mm3 0.11*  --   --    NRBC /100 WBC 0.0 0.0 0.0       Lab Results - Last 18 Months   Lab Units 04/22/22  1008 01/03/22  0853 07/20/21  1015   GLUCOSE mg/dL 112* 94 80   SODIUM mmol/L 140 138 138   POTASSIUM mmol/L 4.1 4.2 4.1   TOTAL CO2 mmol/L  --  34.0* 30.3*   CO2 mmol/L 31.0*  --   --    CHLORIDE mmol/L 101 98 96*   ANION GAP mmol/L 8.0  --   --    CREATININE mg/dL 0.85 1.00 0.88   BUN mg/dL 19 15 16   BUN / CREAT RATIO  22.4 15.0 18.2   CALCIUM mg/dL 9.6 9.7 9.6   EGFR IF NONAFRICN AM mL/min/1.73  --  78 90   ALK PHOS U/L 47 55 58   TOTAL PROTEIN g/dL 6.6  --   --    ALT (SGPT) U/L 24 32 16   AST (SGOT) U/L 19 20 15   BILIRUBIN mg/dL 0.4 0.5 0.7   ALBUMIN g/dL 4.60 5.10 4.80   GLOBULIN gm/dL 2.0  --   --        Lab Results - Last 18 Months   Lab Units 01/03/22  0853   URIC ACID mg/dL 7.2*       Lab Results - Last 18 Months   Lab Units 04/22/22  1008 01/03/22  0853 07/20/21  1015   IRON mcg/dL 120  --   --    TIBC mcg/dL 335 393  --    IRON SATURATION % 36 25  --    FERRITIN ng/mL 496.30* 706.00*  --    TSH uIU/mL  --  1.240 0.979         PAST MEDICAL HISTORY:  ALLERGIES:  Allergies   Allergen Reactions   • Adhesive Tape Rash     VERY SENSITIVE TO ADHESIVE    • Latex Rash and Unknown - High Severity     CURRENT MEDICATIONS:  Outpatient Encounter Medications as of 4/28/2022   Medication Sig Dispense Refill   • acetaminophen (TYLENOL) 500 MG tablet Take 500 mg by mouth Every 6 (Six) Hours As Needed for Mild Pain .     • albuterol (PROVENTIL) (2.5 MG/3ML) 0.083% nebulizer solution Take 2.5 mg by nebulization 3 (Three) Times a Day. 90 each 1   • albuterol sulfate HFA (Ventolin HFA) 108 (90 Base) MCG/ACT inhaler Inhale 2 puffs Every 4 (Four) Hours As Needed for Wheezing or Shortness of Air. 18 g 5   • allopurinol (ZYLOPRIM) 100 MG tablet Take 1 tablet by mouth Daily. 90 tablet 1   • ALPRAZolam (Xanax) 0.5 MG tablet Take 1 tablet by mouth 2 (Two) Times a Day As Needed for Anxiety. 30  tablet 0   • amoxicillin (AMOXIL) 500 MG tablet Take 1 tablet by mouth 3 (Three) Times a Day for 10 days. 30 tablet 0   • Budeson-Glycopyrrol-Formoterol (Breztri Aerosphere) 160-9-4.8 MCG/ACT aerosol inhaler Inhale 2 puffs 2 (Two) Times a Day. 10.7 each 5   • buPROPion XL (WELLBUTRIN XL) 150 MG 24 hr tablet TAKE ONE (1) TABLET BY MOUTH DAILY. 30 tablet 5   • busPIRone (BUSPAR) 15 MG tablet TAKE 1/2 TABLET BY MOUTH TWICE DAILY 30 tablet 2   • cetirizine (ZyrTEC Allergy) 10 MG tablet Take 1 tablet by mouth Daily. 30 tablet 1   • gemfibrozil (LOPID) 600 MG tablet TAKE ONE (1) TABLET BY MOUTH DAILY. NEEDS OFFICE APPT.  90 tablet 2   • hydroCHLOROthiazide (HYDRODIURIL) 25 MG tablet TAKE ONE (1) TABLET BY MOUTH DAILY 90 tablet 1   • ibuprofen (ADVIL,MOTRIN) 600 MG tablet Take 600 mg by mouth Every 6 (Six) Hours As Needed for Mild Pain .     • losartan (COZAAR) 25 MG tablet TAKE ONE (1) TABLET BY MOUTH DAILY.  90 tablet 2   • pantoprazole (PROTONIX) 40 MG EC tablet TAKE ONE (1) TABLET BY MOUTH DAILY 90 tablet 1   • tadalafil (Cialis) 20 MG tablet Take 1 tablet by mouth Daily As Needed for Erectile Dysfunction. 30 tablet 1     No facility-administered encounter medications on file as of 4/28/2022.     ADULT ILLNESSES:  Patient Active Problem List   Diagnosis Code   • Stridor-infrequent R06.1   • Jelani v cord paresis (resser) ([place]) J38.02   • Charcot-Claire-Tooth disease G60.0   • COPD: (BH/pulmonary) J44.9   • Gastroesophageal reflux disease K21.9   • History of tobacco use: 1.22/12m Z87.891   • BMI 28.0-28.9,adult Z68.28   • Chronic neck pain M54.2, G89.29   • Spinal stenosis in cervical region M48.02   • Cervical radiculopathy M54.12   • Chronic right shoulder pain M25.511, G89.29   • Wellness examination Z00.00   • Gout M10.9   • History of chronic respiratory failure Z87.09   • Gait difficulty R26.9   • Laboratory test Z01.89   • Hypertriglyceridemia E78.1   • Low iron E61.1   • Hyperuricemia-zyloprim E79.0   • Heme  positive stool R19.5   • Obstructive sleep apnea (cpap) G47.33   • Hypertension I10   • High serum ferritin-see hemachromatosis R79.89   • History of COVID-19 Z86.16   • Carrier of hemochromatosis HFE gene mutation (offer referral) Z14.8     SURGERIES:  Past Surgical History:   Procedure Laterality Date   • BICEPS TENDONESIS SUBPECTORALIS REPAIR Right 6/28/2019    Procedure: BICEPS TENODESIS - RIGHT;  Surgeon: Yeison Agudelo MD;  Location: [facility] OR;  Service: Orthopedics   • COLONOSCOPY N/A 2/17/2020    Procedure: COLONOSCOPY WITH ANESTHESIA;  Surgeon: Cal Vela DO;  Location: [facility] ENDOSCOPY;  Service: Gastroenterology;  Laterality: N/A;  preop; blood in stool  postop; polyps   PCP anand Jackson    • ENDOSCOPY N/A 2/25/2020    Procedure: ESOPHAGOGASTRODUODENOSCOPY WITH ANESTHESIA;  Surgeon: Cal Vela DO;  Location: [facility] ENDOSCOPY;  Service: Gastroenterology;  Laterality: N/A;  preop; blood in stool  postop; normal   PCP Anand Jackson    • SHOULDER ARTHROSCOPY W/ ROTATOR CUFF REPAIR Right 6/28/2019    Procedure: RIGHT SHOULDER ARTHROSCOPIC ROTATOR CUFF REPAIR, SUBACROMIAL DECOMPRESSION;  Surgeon: Yeison Agudelo MD;  Location: [facility] OR;  Service: Orthopedics     HEALTH MAINTENANCE ITEMS:  Health Maintenance Due   Topic Date Due   • Pneumococcal Vaccine 0-64 (1 - PCV) Never done   • TDAP/TD VACCINES (1 - Tdap) Never done   • ZOSTER VACCINE (1 of 2) Never done   • ANNUAL WELLNESS VISIT  Never done   • COVID-19 Vaccine (3 - Booster for Moderna series) 01/06/2022       <no information>  Last Completed Colonoscopy          COLORECTAL CANCER SCREENING (COLONOSCOPY - Every 5 Years) Next due on 2/17/2025 02/17/2020  COLONOSCOPY    02/17/2020  Surgical Procedure: COLONOSCOPY    01/27/2020  POCT Occult blood x 3, stool              Immunization History   Administered Date(s) Administered   • COVID-19 (MODERNA) 1st, 2nd, 3rd Dose Only 07/06/2021, 08/06/2021     Last Completed Mammogram     This patient has  "no relevant Health Maintenance data.            FAMILY HISTORY:  Family History   Problem Relation Age of Onset   • Cancer Mother    • Diabetes Mother    • Colon polyps Mother    • Hyperlipidemia Father    • Hypertension Father    • Cancer Father    • Colon cancer Neg Hx      SOCIAL HISTORY:  Social History     Socioeconomic History   • Marital status: Single   • Years of education: 10   • Highest education level: GED or equivalent   Tobacco Use   • Smoking status: Former Smoker     Types: Cigars     Quit date: 10/2015     Years since quittin.5   • Smokeless tobacco: Former User     Quit date:    Vaping Use   • Vaping Use: Never used   Substance and Sexual Activity   • Alcohol use: Yes     Alcohol/week: 3.0 standard drinks     Types: 3 Cans of beer per week     Comment: weekly   • Drug use: Yes     Types: Marijuana   • Sexual activity: Yes     Partners: Female       REVIEW OF SYSTEMS:  Review of Systems   Constitutional: Positive for fatigue.   Respiratory: Positive for shortness of breath. Negative for cough.    Musculoskeletal: Positive for gait problem.   Neurological: Negative for dizziness and confusion.   Hematological: Does not bruise/bleed easily.   Psychiatric/Behavioral: Negative for suicidal ideas and depressed mood.       /82   Pulse 80   Temp 97.9 °F (36.6 °C) (Temporal)   Resp 16   Ht 170.2 cm (67\")   Wt 84.5 kg (186 lb 3.2 oz)   SpO2 96%   BMI 29.16 kg/m²  Body surface area is 1.96 meters squared.    Pain Score    22 0925   PainSc: 0-No pain       Physical Exam:  Physical Exam  Constitutional:       Appearance: Normal appearance.   HENT:      Head: Normocephalic and atraumatic.   Eyes:      Extraocular Movements: Extraocular movements intact.   Cardiovascular:      Rate and Rhythm: Normal rate and regular rhythm.      Heart sounds: Murmur heard.     Gallop present.   Pulmonary:      Effort: Pulmonary effort is normal.      Breath sounds: Normal breath sounds.   Abdominal:    "   General: Bowel sounds are normal.      Palpations: Abdomen is soft.   Musculoskeletal:         General: Normal range of motion.      Cervical back: Normal range of motion.      Comments: Limp     Neurological:      General: No focal deficit present.      Mental Status: He is alert and oriented to person, place, and time.   Psychiatric:         Mood and Affect: Mood normal.         Behavior: Behavior normal.         Judgment: Judgment normal.         Iam Gibbs reports a pain score of 0.  No intervention indicated.         ASSESSMENT / PLAN    1. Charcot-Claire-Tooth disease    2. 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  -He also has Charcot-Claire Tooth Disease.  -Liver Function Tests have been normal.  -His Ferritin decreased from 706 to 496.3.      PLAN:  1.  Discussed with patient the treatment for hemochromotosis is therapeutic phlebotomy. Explained that procedure, the risks and benefits.   2.  As patient's Ferritin has decreased significantly, we will continue observation at this time.  3.  We will order CT Abdomen to establish iron content.  Advised patient, we should likely proceed with phlebotomy at next visit.  4.  He will RTC in 4 weeks for follow up, labs and therapeutic phlebotomy if indicated.   5.  Once phlebotomy is initiated, we will preform phlebotomy to keep Ferritin < 50 as tolerated.    6.    Patient will have preoffice labs.    Orders Placed This Encounter   Procedures   • CT Abdomen Without Contrast   • Comprehensive Metabolic Panel   • Ferritin   • CBC & Differential    7.  He will continue all medications and treatment plans for chronic diagnoses.    8.   Care discussed with patient.  Understanding expressed.  Patient agreeable with plan.    I spent 30 minutes caring for Iam on this date of service. This time includes time spent by  me in the following activities: preparing for the visit, reviewing tests, performing a medically appropriate examination and/or evaluation, counseling and educating the patient/family/caregiver, ordering medications, tests, or procedures and documenting information in the medical record.

## 2022-04-28 NOTE — PROGRESS NOTES
April 28, 2022    Hello, may I speak with Iam Gibbs?    My name is HAL    I am  with [facility] HEMATOLOGY & ONCOLOGY  [address]  [place] [address] [phone].    Before we get started may I verify your date of birth? 1965    I am calling to officially welcome you to our practice and ask about your recent visit. Is this a good time to talk?   YES    Tell me about your visit with us. What things went well?  EVERYTHING WENT FINE       We're always looking for ways to make our patients' experiences even better. Do you have recommendations on ways we may improve?   NO, EVERYONE WAS GREAT    Overall were you satisfied with your first visit to our practice? YES       I appreciate you taking the time to speak with me today. Is there anything else I can do for you? NO      Thank you, and have a great day.

## 2022-04-29 LAB — ANDROST SERPL-MCNC: 78 NG/DL (ref 27–152)

## 2022-05-02 DIAGNOSIS — K21.9 GASTROESOPHAGEAL REFLUX DISEASE: ICD-10-CM

## 2022-05-02 DIAGNOSIS — R06.1 STRIDOR: ICD-10-CM

## 2022-05-02 DIAGNOSIS — J38.02 BILATERAL VOCAL CORD PARESIS: ICD-10-CM

## 2022-05-02 RX ORDER — PANTOPRAZOLE SODIUM 40 MG/1
TABLET, DELAYED RELEASE ORAL
Qty: 90 TABLET | Refills: 3 | Status: SHIPPED | OUTPATIENT
Start: 2022-05-02

## 2022-05-02 NOTE — TELEPHONE ENCOUNTER
Rx Refill Note  Requested Prescriptions     Pending Prescriptions Disp Refills   • pantoprazole (PROTONIX) 40 MG EC tablet [Pharmacy Med Name: PANTOPRAZOLE SODIUM 40MG TABLET DR] 90 tablet 3     Sig: TAKE ONE (1) TABLET BY MOUTH DAILY      Last office visit with prescribing clinician: 2/24/2022      Next office visit with prescribing clinician: 7/7/2022            Mitzi Floyd LPN  05/02/22, 16:43 CDT

## 2022-05-03 RX ORDER — LOSARTAN POTASSIUM 25 MG/1
TABLET ORAL
Qty: 90 TABLET | Refills: 4 | Status: SHIPPED | OUTPATIENT
Start: 2022-05-03

## 2022-05-03 RX ORDER — GEMFIBROZIL 600 MG/1
TABLET, FILM COATED ORAL
Qty: 90 TABLET | Refills: 4 | Status: SHIPPED | OUTPATIENT
Start: 2022-05-03

## 2022-05-18 ENCOUNTER — APPOINTMENT (OUTPATIENT)
Dept: MRI IMAGING | Facility: HOSPITAL | Age: 57
End: 2022-05-18

## 2022-05-26 ENCOUNTER — APPOINTMENT (OUTPATIENT)
Dept: CT IMAGING | Facility: HOSPITAL | Age: 57
End: 2022-05-26

## 2022-05-26 ENCOUNTER — LAB (OUTPATIENT)
Dept: LAB | Facility: HOSPITAL | Age: 57
End: 2022-05-26

## 2022-05-26 ENCOUNTER — OFFICE VISIT (OUTPATIENT)
Dept: ONCOLOGY | Facility: CLINIC | Age: 57
End: 2022-05-26

## 2022-05-26 VITALS
TEMPERATURE: 98 F | WEIGHT: 186.1 LBS | HEIGHT: 67 IN | BODY MASS INDEX: 29.21 KG/M2 | RESPIRATION RATE: 16 BRPM | HEART RATE: 84 BPM | DIASTOLIC BLOOD PRESSURE: 82 MMHG | SYSTOLIC BLOOD PRESSURE: 120 MMHG | OXYGEN SATURATION: 96 %

## 2022-05-26 DIAGNOSIS — E83.110 HEMOCHROMATOSIS ASSOCIATED WITH COMPOUND HETEROZYGOUS MUTATION IN HFE GENE: Primary | ICD-10-CM

## 2022-05-26 LAB
ALBUMIN SERPL-MCNC: 4.5 G/DL (ref 3.5–5.2)
ALBUMIN/GLOB SERPL: 2.5 G/DL
ALP SERPL-CCNC: 50 U/L (ref 39–117)
ALT SERPL W P-5'-P-CCNC: 20 U/L (ref 1–41)
ANION GAP SERPL CALCULATED.3IONS-SCNC: 8 MMOL/L (ref 5–15)
AST SERPL-CCNC: 15 U/L (ref 1–40)
BASOPHILS # BLD AUTO: 0.05 10*3/MM3 (ref 0–0.2)
BASOPHILS NFR BLD AUTO: 0.9 % (ref 0–1.5)
BILIRUB SERPL-MCNC: 0.4 MG/DL (ref 0–1.2)
BUN SERPL-MCNC: 26 MG/DL (ref 6–20)
BUN/CREAT SERPL: 27.4 (ref 7–25)
CALCIUM SPEC-SCNC: 8.9 MG/DL (ref 8.6–10.5)
CHLORIDE SERPL-SCNC: 102 MMOL/L (ref 98–107)
CO2 SERPL-SCNC: 28 MMOL/L (ref 22–29)
CREAT SERPL-MCNC: 0.95 MG/DL (ref 0.76–1.27)
DEPRECATED RDW RBC AUTO: 40.7 FL (ref 37–54)
EGFRCR SERPLBLD CKD-EPI 2021: 94.5 ML/MIN/1.73
EOSINOPHIL # BLD AUTO: 0.25 10*3/MM3 (ref 0–0.4)
EOSINOPHIL NFR BLD AUTO: 4.6 % (ref 0.3–6.2)
ERYTHROCYTE [DISTWIDTH] IN BLOOD BY AUTOMATED COUNT: 12.3 % (ref 12.3–15.4)
FERRITIN SERPL-MCNC: 538.9 NG/ML (ref 30–400)
GLOBULIN UR ELPH-MCNC: 1.8 GM/DL
GLUCOSE SERPL-MCNC: 90 MG/DL (ref 65–99)
HCT VFR BLD AUTO: 44.6 % (ref 37.5–51)
HGB BLD-MCNC: 15.3 G/DL (ref 13–17.7)
HOLD SPECIMEN: NORMAL
IMM GRANULOCYTES # BLD AUTO: 0.03 10*3/MM3 (ref 0–0.05)
IMM GRANULOCYTES NFR BLD AUTO: 0.6 % (ref 0–0.5)
LYMPHOCYTES # BLD AUTO: 1.15 10*3/MM3 (ref 0.7–3.1)
LYMPHOCYTES NFR BLD AUTO: 21.2 % (ref 19.6–45.3)
MCH RBC QN AUTO: 31.2 PG (ref 26.6–33)
MCHC RBC AUTO-ENTMCNC: 34.3 G/DL (ref 31.5–35.7)
MCV RBC AUTO: 90.8 FL (ref 79–97)
MONOCYTES # BLD AUTO: 0.67 10*3/MM3 (ref 0.1–0.9)
MONOCYTES NFR BLD AUTO: 12.3 % (ref 5–12)
NEUTROPHILS NFR BLD AUTO: 3.28 10*3/MM3 (ref 1.7–7)
NEUTROPHILS NFR BLD AUTO: 60.4 % (ref 42.7–76)
NRBC BLD AUTO-RTO: 0 /100 WBC (ref 0–0.2)
PLATELET # BLD AUTO: 217 10*3/MM3 (ref 140–450)
PMV BLD AUTO: 10.2 FL (ref 6–12)
POTASSIUM SERPL-SCNC: 3.8 MMOL/L (ref 3.5–5.2)
PROT SERPL-MCNC: 6.3 G/DL (ref 6–8.5)
RBC # BLD AUTO: 4.91 10*6/MM3 (ref 4.14–5.8)
SODIUM SERPL-SCNC: 138 MMOL/L (ref 136–145)
WBC NRBC COR # BLD: 5.43 10*3/MM3 (ref 3.4–10.8)

## 2022-05-26 PROCEDURE — 99195 PHLEBOTOMY: CPT | Performed by: NURSE PRACTITIONER

## 2022-05-26 PROCEDURE — 85025 COMPLETE CBC W/AUTO DIFF WBC: CPT

## 2022-05-26 PROCEDURE — 36415 COLL VENOUS BLD VENIPUNCTURE: CPT

## 2022-05-26 PROCEDURE — 82728 ASSAY OF FERRITIN: CPT

## 2022-05-26 PROCEDURE — 99214 OFFICE O/P EST MOD 30 MIN: CPT | Performed by: NURSE PRACTITIONER

## 2022-05-26 PROCEDURE — 80053 COMPREHEN METABOLIC PANEL: CPT

## 2022-05-26 NOTE — PROGRESS NOTES
[facility]ATOLOGY & ONCOLOGY  [address]  [facility] [place] [address] [phone]    Patient Name: Iam Gibbs  Encounter Date: 05/26/2022  YOB: 1965  Patient Number: [id number]    Hematology / Oncology Progress Note    CHIEF COMPLAINT:      HPI / REASON FOR VISIT: Iam Gibbs is a 57 y.o. male who is followed by this office for elevated iron     He has health history significant for HTN, GERD, Anxiety, Gout, erectile dysfunction., Carcot Claire Tooth, Soinal Stenosis, and COPD.  He quit smoking approx 6 years ago and drinks approx 12 beers / week.  He does note increased fatigue but has attributed it to seasonal affective disorder.   Joint pain and muscles weakness but of note, pt has CMT.       He was seen on 01/31/2022 to follow up with Dr. Jackson on labs that were drawn on 01/03/22. These labs showed a Ferritin level of 706.  Hemochromotosis genes were checked and results were compound heterozygous with one copy of C282Y and one copy of S65C.  Results for H63D were negative.  His LFT have been normal although he does drink beer regularly.  Low dose CT of chest on 01/03/22 showed no acute processes in the upper abdomen.      He was seen seen in our office for consultation on 04/22/22.   Labs were drawn.  His Hemochromatosis was compound heterozygous with one copy of C282Y and one copy of S65C.    He was scheduled for abdominal CT to evaluated for any iron deposits.  He states he couldn't tolerate MRI.  Will order CT.    His Ferritin today is 538.9.  Advised we will need to proceed with therapeutic phlebotomy every 2 weeks until his Ferritin < 50.  All questions were answered and pt agreed.       LABS    Lab Results - Last 18 Months   Lab Units 05/26/22  0903 04/22/22  1008 01/03/22  0853 07/20/21  1015   HEMOGLOBIN g/dL 15.3 16.4 17.3 16.5   HEMATOCRIT % 44.6 48.1 51.4* 48.3   MCV fL 90.8 92.3 91.0 94.3   WBC  10*3/mm3 5.43 7.56 7.03 5.82   RDW % 12.3 12.7 12.5 12.6   MPV fL 10.2 9.7  --   --    PLATELETS 10*3/mm3 217 229 251 239   IMM GRAN % % 0.6* 1.5*  --   --    NEUTROS ABS 10*3/mm3 3.28 4.80 4.71 3.40   LYMPHS ABS 10*3/mm3 1.15 1.41 1.15 1.18   MONOS ABS 10*3/mm3 0.67 0.89 0.72 0.76   EOS ABS 10*3/mm3 0.25 0.27 0.32 0.38   BASOS ABS 10*3/mm3 0.05 0.08 0.07 0.08   IMMATURE GRANS (ABS) 10*3/mm3 0.03 0.11*  --   --    NRBC /100 WBC 0.0 0.0 0.0 0.0       Lab Results - Last 18 Months   Lab Units 05/26/22  0903 04/22/22  1008 01/03/22  0853 07/20/21  1015   GLUCOSE mg/dL 90 112* 94 80   SODIUM mmol/L 138 140 138 138   POTASSIUM mmol/L 3.8 4.1 4.2 4.1   TOTAL CO2 mmol/L  --   --  34.0* 30.3*   CO2 mmol/L 28.0 31.0*  --   --    CHLORIDE mmol/L 102 101 98 96*   ANION GAP mmol/L 8.0 8.0  --   --    CREATININE mg/dL 0.95 0.85 1.00 0.88   BUN mg/dL 26* 19 15 16   BUN / CREAT RATIO  27.4* 22.4 15.0 18.2   CALCIUM mg/dL 8.9 9.6 9.7 9.6   EGFR IF NONAFRICN AM mL/min/1.73  --   --  78 90   ALK PHOS U/L 50 47 55 58   TOTAL PROTEIN g/dL 6.3 6.6  --   --    ALT (SGPT) U/L 20 24 32 16   AST (SGOT) U/L 15 19 20 15   BILIRUBIN mg/dL 0.4 0.4 0.5 0.7   ALBUMIN g/dL 4.50 4.60 5.10 4.80   GLOBULIN gm/dL 1.8 2.0  --   --        Lab Results - Last 18 Months   Lab Units 01/03/22  0853   URIC ACID mg/dL 7.2*       Lab Results - Last 18 Months   Lab Units 05/26/22  0903 04/22/22  1008 01/03/22  0853 07/20/21  1015   IRON mcg/dL  --  120  --   --    TIBC mcg/dL  --  335 393  --    IRON SATURATION %  --  36 25  --    FERRITIN ng/mL 538.90* 496.30* 706.00*  --    TSH uIU/mL  --   --  1.240 0.979         PAST MEDICAL HISTORY:  ALLERGIES:  Allergies   Allergen Reactions   • Adhesive Tape Rash     VERY SENSITIVE TO ADHESIVE    • Latex Rash and Unknown - High Severity     CURRENT MEDICATIONS:  Outpatient Encounter Medications as of 5/26/2022   Medication Sig Dispense Refill   • acetaminophen (TYLENOL) 500 MG tablet Take 500 mg by mouth Every 6 (Six)  Hours As Needed for Mild Pain .     • albuterol (PROVENTIL) (2.5 MG/3ML) 0.083% nebulizer solution Take 2.5 mg by nebulization 3 (Three) Times a Day. 90 each 1   • albuterol sulfate HFA (Ventolin HFA) 108 (90 Base) MCG/ACT inhaler Inhale 2 puffs Every 4 (Four) Hours As Needed for Wheezing or Shortness of Air. 18 g 5   • allopurinol (ZYLOPRIM) 100 MG tablet Take 1 tablet by mouth Daily. 90 tablet 1   • ALPRAZolam (Xanax) 0.5 MG tablet Take 1 tablet by mouth 2 (Two) Times a Day As Needed for Anxiety. 30 tablet 0   • Budeson-Glycopyrrol-Formoterol (Breztri Aerosphere) 160-9-4.8 MCG/ACT aerosol inhaler Inhale 2 puffs 2 (Two) Times a Day. 10.7 each 5   • buPROPion XL (WELLBUTRIN XL) 150 MG 24 hr tablet TAKE ONE (1) TABLET BY MOUTH DAILY. 30 tablet 5   • busPIRone (BUSPAR) 15 MG tablet TAKE 1/2 TABLET BY MOUTH TWICE DAILY 30 tablet 2   • cetirizine (ZyrTEC Allergy) 10 MG tablet Take 1 tablet by mouth Daily. 30 tablet 1   • gemfibrozil (LOPID) 600 MG tablet TAKE ONE (1) TABLET BY MOUTH DAILY. NEEDS OFFICE APPT. 90 tablet 4   • hydroCHLOROthiazide (HYDRODIURIL) 25 MG tablet TAKE ONE (1) TABLET BY MOUTH DAILY 90 tablet 1   • ibuprofen (ADVIL,MOTRIN) 600 MG tablet Take 600 mg by mouth Every 6 (Six) Hours As Needed for Mild Pain .     • losartan (COZAAR) 25 MG tablet TAKE ONE (1) TABLET BY MOUTH DAILY. 90 tablet 4   • pantoprazole (PROTONIX) 40 MG EC tablet TAKE ONE (1) TABLET BY MOUTH DAILY 90 tablet 3   • tadalafil (Cialis) 20 MG tablet Take 1 tablet by mouth Daily As Needed for Erectile Dysfunction. 30 tablet 1     No facility-administered encounter medications on file as of 5/26/2022.     ADULT ILLNESSES:  Patient Active Problem List   Diagnosis Code   • Stridor-infrequent R06.1   • Jelani v cord paresis (resser) ([place]) J38.02   • Charcot-Claire-Tooth disease G60.0   • COPD: (BH/pulmonary) J44.9   • Gastroesophageal reflux disease K21.9   • History of tobacco use: 1.22/12m Z87.891   • BMI 28.0-28.9,adult Z68.28   •  Chronic neck pain M54.2, G89.29   • Spinal stenosis in cervical region M48.02   • Cervical radiculopathy M54.12   • Chronic right shoulder pain M25.511, G89.29   • Wellness examination Z00.00   • Gout M10.9   • History of chronic respiratory failure Z87.09   • Gait difficulty R26.9   • Laboratory test Z01.89   • Hypertriglyceridemia E78.1   • Low iron E61.1   • Hyperuricemia-zyloprim E79.0   • Heme positive stool R19.5   • Obstructive sleep apnea (cpap) G47.33   • Hypertension I10   • High serum ferritin-see hemachromatosis R79.89   • History of COVID-19 Z86.16   • Carrier of hemochromatosis HFE gene mutation (offer referral) Z14.8     SURGERIES:  Past Surgical History:   Procedure Laterality Date   • BICEPS TENDONESIS SUBPECTORALIS REPAIR Right 6/28/2019    Procedure: BICEPS TENODESIS - RIGHT;  Surgeon: Yeison Agudelo MD;  Location: [facility] OR;  Service: Orthopedics   • COLONOSCOPY N/A 2/17/2020    Procedure: COLONOSCOPY WITH ANESTHESIA;  Surgeon: Cal Vela DO;  Location: [facility] ENDOSCOPY;  Service: Gastroenterology;  Laterality: N/A;  preop; blood in stool  postop; polyps   PCP anand Jackson    • ENDOSCOPY N/A 2/25/2020    Procedure: ESOPHAGOGASTRODUODENOSCOPY WITH ANESTHESIA;  Surgeon: Cal Vela DO;  Location: [facility] ENDOSCOPY;  Service: Gastroenterology;  Laterality: N/A;  preop; blood in stool  postop; normal   PCP Anand Jackson    • SHOULDER ARTHROSCOPY W/ ROTATOR CUFF REPAIR Right 6/28/2019    Procedure: RIGHT SHOULDER ARTHROSCOPIC ROTATOR CUFF REPAIR, SUBACROMIAL DECOMPRESSION;  Surgeon: Yeison Agudelo MD;  Location: [facility] OR;  Service: Orthopedics     HEALTH MAINTENANCE ITEMS:  Health Maintenance Due   Topic Date Due   • Pneumococcal Vaccine 0-64 (1 - PCV) Never done   • TDAP/TD VACCINES (1 - Tdap) Never done   • ZOSTER VACCINE (1 of 2) Never done   • ANNUAL WELLNESS VISIT  Never done   • COVID-19 Vaccine (3 - Booster for Moderna series) 01/06/2022       <no information>  Last Completed  "Colonoscopy          COLORECTAL CANCER SCREENING (COLONOSCOPY - Every 5 Years) Next due on 2020  Surgical Procedure: COLONOSCOPY    2020  COLONOSCOPY    2020  POCT Occult blood x 3, stool              Immunization History   Administered Date(s) Administered   • COVID-19 (MODERNA) 1st, 2nd, 3rd Dose Only 2021, 2021     Last Completed Mammogram     This patient has no relevant Health Maintenance data.            FAMILY HISTORY:  Family History   Problem Relation Age of Onset   • Cancer Mother    • Diabetes Mother    • Colon polyps Mother    • Hyperlipidemia Father    • Hypertension Father    • Cancer Father    • Colon cancer Neg Hx      SOCIAL HISTORY:  Social History     Socioeconomic History   • Marital status: Single   • Years of education: 10   • Highest education level: GED or equivalent   Tobacco Use   • Smoking status: Former Smoker     Types: Cigars     Quit date: 10/2015     Years since quittin.6   • Smokeless tobacco: Former User     Quit date:    Vaping Use   • Vaping Use: Never used   Substance and Sexual Activity   • Alcohol use: Yes     Alcohol/week: 3.0 standard drinks     Types: 3 Cans of beer per week     Comment: weekly   • Drug use: Yes     Types: Marijuana   • Sexual activity: Yes     Partners: Female       REVIEW OF SYSTEMS:  Review of Systems  Constitutional: Positive for fatigue.   Respiratory: Positive for shortness of breath. Negative for cough.    Musculoskeletal: Positive for gait problem.   Neurological: Negative for dizziness and confusion.   Hematological: Does not bruise/bleed easily.   Psychiatric/Behavioral: Negative for suicidal ideas and depressed mood.     /82   Pulse 84   Temp 98 °F (36.7 °C) (Temporal)   Resp 16   Ht 170.2 cm (67\")   Wt 84.4 kg (186 lb 1.6 oz)   SpO2 96%   BMI 29.15 kg/m²  Body surface area is 1.96 meters squared.    Pain Score    22 0911   PainSc: 0-No pain       Physical Exam:  Physical " Exam  Constitutional:       Appearance: Normal appearance.   HENT:      Head: Normocephalic and atraumatic.   Eyes:      Extraocular Movements: Extraocular movements intact.   Cardiovascular:      Rate and Rhythm: Normal rate and regular rhythm.   Pulmonary:      Effort: Pulmonary effort is normal.      Breath sounds: Normal breath sounds.   Abdominal:      General: Abdomen is flat.      Palpations: Abdomen is soft.   Musculoskeletal:      Comments: Abnormal gait   Skin:     General: Skin is warm and dry.   Neurological:      General: No focal deficit present.      Mental Status: He is alert and oriented to person, place, and time.         Iam Gibbs reports a pain score of 0. No intervention indicated.        ASSESSMENT / PLAN    1. Hemochromatosis associated with compound heterozygous mutation in HFE gene (HCC)            ASSESSMENT:   -Pt has compound Heterozygous Hemochromotosis one copy of C282Y and one copy of S65C.  -He also has Charcot-Claire Tooth Disease.  -Liver Function Tests have been normal.  -His Ferritin increased from 496 to 538.9      PLAN:  1.  Discussed with patient the treatment for hemochromotosis is therapeutic phlebotomy. Explained that procedure, the risks and benefits.   2.  Patient's ferritin has increased from 496 to 538.9 .   3. We will order CT Abdomen to establish iron content. Pt was unable to tolerate MRI.    4.  We will initiate therapeutic phlebotomy today.  500 cc whole blood was removed.  Pt tolerated phlebotomy well w/o s/s of hypovolemia or hypotension.    5.  We will continue to perform phlebotomy q 2 weeks until his ferritin is <50.  He will have lab drawn before each treatment.    6.  He will RTC for visist with provider in 6 weeks.   7.  He will continue all medications and treatment plans per PCP and other providers.    8.   Care discussed with patient.  Understanding expressed.  Patient agreeable with plan.    PHMEBOTOMY TODAY        I spent 30 minutes caring for  Iam on this date of service. This time includes time spent by me in the following activities: preparing for the visit, reviewing tests, obtaining and/or reviewing a separately obtained history, counseling and educating the patient/family/caregiver, ordering medications, tests, or procedures and documenting information in the medical record.

## 2022-05-27 ENCOUNTER — APPOINTMENT (OUTPATIENT)
Dept: CT IMAGING | Facility: HOSPITAL | Age: 57
End: 2022-05-27

## 2022-05-27 ENCOUNTER — HOSPITAL ENCOUNTER (OUTPATIENT)
Dept: CT IMAGING | Facility: HOSPITAL | Age: 57
Discharge: HOME | End: 2022-05-27
Admitting: NURSE PRACTITIONER

## 2022-05-27 DIAGNOSIS — E83.110 HEMOCHROMATOSIS ASSOCIATED WITH COMPOUND HETEROZYGOUS MUTATION IN HFE GENE: ICD-10-CM

## 2022-05-27 PROCEDURE — 74150 CT ABDOMEN W/O CONTRAST: CPT

## 2022-05-31 DIAGNOSIS — I70.90 ATHEROMATOUS PLAQUE: Primary | ICD-10-CM

## 2022-05-31 PROBLEM — K76.0 FATTY LIVER: Status: ACTIVE | Noted: 2022-05-31

## 2022-06-01 ENCOUNTER — TELEPHONE (OUTPATIENT)
Dept: ONCOLOGY | Facility: CLINIC | Age: 57
End: 2022-06-01

## 2022-06-01 NOTE — TELEPHONE ENCOUNTER
Notified pt of cardiology referral due to atheromatous disease of the aortoiliac vessels.  He v/u       results oon CT Scan - ----- Message from RUEL Deutsch sent at 5/30/2022  5:25 PM CDT -----  We need to refer him to cardiology related to atheromatous disease of the aortoiliac vessels.

## 2022-06-09 ENCOUNTER — OFFICE VISIT (OUTPATIENT)
Dept: CARDIOLOGY | Facility: CLINIC | Age: 57
End: 2022-06-09

## 2022-06-09 ENCOUNTER — LAB (OUTPATIENT)
Dept: LAB | Facility: HOSPITAL | Age: 57
End: 2022-06-09

## 2022-06-09 ENCOUNTER — TELEPHONE (OUTPATIENT)
Dept: CARDIOLOGY | Facility: CLINIC | Age: 57
End: 2022-06-09

## 2022-06-09 ENCOUNTER — CLINICAL SUPPORT (OUTPATIENT)
Dept: ONCOLOGY | Facility: CLINIC | Age: 57
End: 2022-06-09

## 2022-06-09 VITALS
SYSTOLIC BLOOD PRESSURE: 152 MMHG | HEIGHT: 67 IN | WEIGHT: 185 LBS | OXYGEN SATURATION: 98 % | DIASTOLIC BLOOD PRESSURE: 98 MMHG | HEART RATE: 99 BPM | BODY MASS INDEX: 29.03 KG/M2

## 2022-06-09 DIAGNOSIS — E83.118 OTHER HEMOCHROMATOSIS: ICD-10-CM

## 2022-06-09 DIAGNOSIS — G60.0 CHARCOT-MARIE-TOOTH DISEASE: Primary | ICD-10-CM

## 2022-06-09 DIAGNOSIS — I25.84 CORONARY ARTERY CALCIFICATION: ICD-10-CM

## 2022-06-09 DIAGNOSIS — I10 PRIMARY HYPERTENSION: ICD-10-CM

## 2022-06-09 DIAGNOSIS — Z87.09 HISTORY OF CHRONIC RESPIRATORY FAILURE: ICD-10-CM

## 2022-06-09 DIAGNOSIS — E78.1 HYPERTRIGLYCERIDEMIA: ICD-10-CM

## 2022-06-09 DIAGNOSIS — I25.10 CORONARY ARTERY CALCIFICATION: ICD-10-CM

## 2022-06-09 DIAGNOSIS — Z86.16 HISTORY OF COVID-19: ICD-10-CM

## 2022-06-09 DIAGNOSIS — R06.09 DOE (DYSPNEA ON EXERTION): ICD-10-CM

## 2022-06-09 DIAGNOSIS — J44.9 CHRONIC OBSTRUCTIVE PULMONARY DISEASE, UNSPECIFIED COPD TYPE: ICD-10-CM

## 2022-06-09 DIAGNOSIS — R00.2 PALPITATIONS: ICD-10-CM

## 2022-06-09 DIAGNOSIS — E83.110 HEMOCHROMATOSIS ASSOCIATED WITH COMPOUND HETEROZYGOUS MUTATION IN HFE GENE: Primary | ICD-10-CM

## 2022-06-09 DIAGNOSIS — M48.02 SPINAL STENOSIS IN CERVICAL REGION: ICD-10-CM

## 2022-06-09 DIAGNOSIS — Z14.8 CARRIER OF HEMOCHROMATOSIS HFE GENE MUTATION: ICD-10-CM

## 2022-06-09 DIAGNOSIS — E83.110 HEMOCHROMATOSIS ASSOCIATED WITH COMPOUND HETEROZYGOUS MUTATION IN HFE GENE: ICD-10-CM

## 2022-06-09 PROBLEM — Z82.49 FAMILY HISTORY OF EARLY CAD: Status: ACTIVE | Noted: 2022-06-09

## 2022-06-09 LAB
BASOPHILS # BLD AUTO: 0.07 10*3/MM3 (ref 0–0.2)
BASOPHILS NFR BLD AUTO: 0.9 % (ref 0–1.5)
DEPRECATED RDW RBC AUTO: 42.5 FL (ref 37–54)
EOSINOPHIL # BLD AUTO: 0.19 10*3/MM3 (ref 0–0.4)
EOSINOPHIL NFR BLD AUTO: 2.4 % (ref 0.3–6.2)
ERYTHROCYTE [DISTWIDTH] IN BLOOD BY AUTOMATED COUNT: 12.8 % (ref 12.3–15.4)
FERRITIN SERPL-MCNC: 400 NG/ML (ref 30–400)
HCT VFR BLD AUTO: 47.4 % (ref 37.5–51)
HGB BLD-MCNC: 16 G/DL (ref 13–17.7)
IMM GRANULOCYTES # BLD AUTO: 0.06 10*3/MM3 (ref 0–0.05)
IMM GRANULOCYTES NFR BLD AUTO: 0.8 % (ref 0–0.5)
LYMPHOCYTES # BLD AUTO: 1.1 10*3/MM3 (ref 0.7–3.1)
LYMPHOCYTES NFR BLD AUTO: 14 % (ref 19.6–45.3)
MCH RBC QN AUTO: 31.1 PG (ref 26.6–33)
MCHC RBC AUTO-ENTMCNC: 33.8 G/DL (ref 31.5–35.7)
MCV RBC AUTO: 92 FL (ref 79–97)
MONOCYTES # BLD AUTO: 0.79 10*3/MM3 (ref 0.1–0.9)
MONOCYTES NFR BLD AUTO: 10.1 % (ref 5–12)
NEUTROPHILS NFR BLD AUTO: 5.63 10*3/MM3 (ref 1.7–7)
NEUTROPHILS NFR BLD AUTO: 71.8 % (ref 42.7–76)
NRBC BLD AUTO-RTO: 0 /100 WBC (ref 0–0.2)
PLATELET # BLD AUTO: 250 10*3/MM3 (ref 140–450)
PMV BLD AUTO: 9.7 FL (ref 6–12)
RBC # BLD AUTO: 5.15 10*6/MM3 (ref 4.14–5.8)
WBC NRBC COR # BLD: 7.84 10*3/MM3 (ref 3.4–10.8)

## 2022-06-09 PROCEDURE — 99195 PHLEBOTOMY: CPT | Performed by: NURSE PRACTITIONER

## 2022-06-09 PROCEDURE — 93000 ELECTROCARDIOGRAM COMPLETE: CPT | Performed by: INTERNAL MEDICINE

## 2022-06-09 PROCEDURE — 36415 COLL VENOUS BLD VENIPUNCTURE: CPT

## 2022-06-09 PROCEDURE — 99204 OFFICE O/P NEW MOD 45 MIN: CPT | Performed by: INTERNAL MEDICINE

## 2022-06-09 PROCEDURE — 82728 ASSAY OF FERRITIN: CPT

## 2022-06-09 PROCEDURE — 85025 COMPLETE CBC W/AUTO DIFF WBC: CPT

## 2022-06-09 NOTE — PROGRESS NOTES
I have reviewed the notes, assessments, and/or procedures performed by Angelita Vargas RN, I concur with her/his documentation of Iam Gibbs.

## 2022-06-09 NOTE — PROGRESS NOTES
Iam Gibbs  [id number]  1965  57 y.o.  male    Referring Provider: Nick Jackson MD    Reason for  Visit:  Initial visit to establish care with myself for ongoing longitudinal care related to cardiovascular issues   Significant calcification of coronary arteries on  prior CT chest    Subjective    Mild chronic exertional shortness of breath on exertion relieved with rest  No significant cough or wheezing    Intermittent increase in heart rates   Occurs especially when he gets nervous  No associated symptoms of dizziness, weakness, chest pain,  shortness of breath    No associated chest pain  No significant pedal edema    No fever or chills  No significant expectoration    No hemoptysis  No presyncope or syncope    Tolerating current medications well with no untoward side effects   Compliant with prescribed medication regimen. Tries to adhere to cardiac diet.     Has CMT   Had Covid 01/2022    No bleeding, excessive bruising, gait instability or fall risks    Uses cane for support and balance   Exsmoker     Had Covid 01/2022  BP well controlled at home.   120s/ 70s   Gets very anxious     History of present illness:  Iam Gibbs is a 57 y.o. yo male with CMT  who presents today for   Chief Complaint   Patient presents with   • Establish Care     Recent CT Abdomen   .    History  Past Medical History:   Diagnosis Date   • Anemia    • Arthritis    • Back pain    • Charcot-Claire-Tooth disease     DX AT 19    • Claustrophobia    • COPD (chronic obstructive pulmonary disease) (HCC)    • Elevated cholesterol    • Gait abnormality     DUE TO CMT    • GERD (gastroesophageal reflux disease)    • Hyperlipidemia    • Hypertension    • Irritable bowel    • Other diseases of vocal cords     NARROW VOICE BOX    • Sleep apnea     DOES NOT USE BIPAP OR CPAP    ,   Past Surgical History:   Procedure Laterality Date   • BICEPS TENDONESIS SUBPECTORALIS REPAIR Right 6/28/2019    Procedure: BICEPS  TENODESIS - RIGHT;  Surgeon: Yeison Agudelo MD;  Location: [facility] OR;  Service: Orthopedics   • COLONOSCOPY N/A 2020    Procedure: COLONOSCOPY WITH ANESTHESIA;  Surgeon: Cal Vela DO;  Location: [facility] ENDOSCOPY;  Service: Gastroenterology;  Laterality: N/A;  preop; blood in stool  postop; polyps   PCP anand Jackson    • ENDOSCOPY N/A 2020    Procedure: ESOPHAGOGASTRODUODENOSCOPY WITH ANESTHESIA;  Surgeon: Cal Vela DO;  Location: [facility] ENDOSCOPY;  Service: Gastroenterology;  Laterality: N/A;  preop; blood in stool  postop; normal   PCP Anand Jackson    • SHOULDER ARTHROSCOPY W/ ROTATOR CUFF REPAIR Right 2019    Procedure: RIGHT SHOULDER ARTHROSCOPIC ROTATOR CUFF REPAIR, SUBACROMIAL DECOMPRESSION;  Surgeon: Yeison Agudelo MD;  Location: [facility] OR;  Service: Orthopedics   ,   Family History   Problem Relation Age of Onset   • Cancer Mother    • Diabetes Mother    • Colon polyps Mother    • Hyperlipidemia Father    • Hypertension Father    • Cancer Father    • Colon cancer Neg Hx    ,   Social History     Tobacco Use   • Smoking status: Former Smoker     Types: Cigars     Quit date: 10/2015     Years since quittin.6   • Smokeless tobacco: Former User     Quit date:    Vaping Use   • Vaping Use: Never used   Substance Use Topics   • Alcohol use: Yes     Alcohol/week: 3.0 standard drinks     Types: 3 Cans of beer per week     Comment: weekly   • Drug use: Yes     Types: Marijuana   ,     Medications  Current Outpatient Medications   Medication Sig Dispense Refill   • acetaminophen (TYLENOL) 500 MG tablet Take 500 mg by mouth Every 6 (Six) Hours As Needed for Mild Pain .     • albuterol (PROVENTIL) (2.5 MG/3ML) 0.083% nebulizer solution Take 2.5 mg by nebulization 3 (Three) Times a Day. 90 each 1   • albuterol sulfate HFA (Ventolin HFA) 108 (90 Base) MCG/ACT inhaler Inhale 2 puffs Every 4 (Four) Hours As Needed for Wheezing or Shortness of Air. 18 g 5   • allopurinol (ZYLOPRIM) 100 MG  tablet Take 1 tablet by mouth Daily. 90 tablet 1   • ALPRAZolam (Xanax) 0.5 MG tablet Take 1 tablet by mouth 2 (Two) Times a Day As Needed for Anxiety. (Patient taking differently: Take 0.5 mg by mouth As Needed for Anxiety.) 30 tablet 0   • Budeson-Glycopyrrol-Formoterol (Breztri Aerosphere) 160-9-4.8 MCG/ACT aerosol inhaler Inhale 2 puffs 2 (Two) Times a Day. 10.7 each 5   • buPROPion XL (WELLBUTRIN XL) 150 MG 24 hr tablet TAKE ONE (1) TABLET BY MOUTH DAILY. 30 tablet 5   • busPIRone (BUSPAR) 15 MG tablet TAKE 1/2 TABLET BY MOUTH TWICE DAILY 30 tablet 2   • cetirizine (ZyrTEC Allergy) 10 MG tablet Take 1 tablet by mouth Daily. 30 tablet 1   • gemfibrozil (LOPID) 600 MG tablet TAKE ONE (1) TABLET BY MOUTH DAILY. NEEDS OFFICE APPT. 90 tablet 4   • hydroCHLOROthiazide (HYDRODIURIL) 25 MG tablet TAKE ONE (1) TABLET BY MOUTH DAILY 90 tablet 1   • ibuprofen (ADVIL,MOTRIN) 600 MG tablet Take 600 mg by mouth Every 6 (Six) Hours As Needed for Mild Pain .     • losartan (COZAAR) 25 MG tablet TAKE ONE (1) TABLET BY MOUTH DAILY. 90 tablet 4   • pantoprazole (PROTONIX) 40 MG EC tablet TAKE ONE (1) TABLET BY MOUTH DAILY 90 tablet 3   • tadalafil (Cialis) 20 MG tablet Take 1 tablet by mouth Daily As Needed for Erectile Dysfunction. 30 tablet 1     No current facility-administered medications for this visit.       Allergies:  Adhesive tape and Latex    Review of Systems  Review of Systems   Constitutional: Negative.   HENT: Negative.    Eyes: Negative.    Cardiovascular: Positive for dyspnea on exertion. Negative for chest pain, claudication, cyanosis, irregular heartbeat, leg swelling, near-syncope, orthopnea, palpitations, paroxysmal nocturnal dyspnea and syncope.   Respiratory: Negative.    Endocrine: Negative.    Hematologic/Lymphatic: Negative.    Skin: Negative.    Musculoskeletal: Positive for arthritis, back pain and joint pain.   Gastrointestinal: Negative for anorexia.   Genitourinary: Negative.    Neurological:  "Negative.    Psychiatric/Behavioral: Negative.        Objective     Physical Exam:  /98   Pulse 99   Ht 170.2 cm (67\")   Wt 83.9 kg (185 lb)   SpO2 98%   BMI 28.98 kg/m²     Physical Exam  Constitutional:       Appearance: He is well-developed.   HENT:      Head: Normocephalic.   Neck:      Vascular: Normal carotid pulses. No carotid bruit or JVD.      Trachea: No tracheal tenderness or tracheal deviation.   Cardiovascular:      Rate and Rhythm: Regular rhythm.      Pulses: Normal pulses.      Heart sounds: Normal heart sounds.   Pulmonary:      Effort: Pulmonary effort is normal.      Breath sounds: No stridor.   Abdominal:      General: There is no distension.      Palpations: Abdomen is soft.      Tenderness: There is no abdominal tenderness.   Musculoskeletal:      Cervical back: No edema.   Skin:     General: Skin is warm.   Neurological:      Mental Status: He is alert.      Cranial Nerves: No cranial nerve deficit.      Sensory: No sensory deficit.   Psychiatric:         Speech: Speech normal.         Behavior: Behavior normal.         Results Review:        ____________________________________________________________________________________________________________________________________________  Health maintenance and recommendations    Low salt/ HTN/ Heart healthy carbohydrate restricted cardiac diet   The patient is advised to reduce or avoid caffeine or other cardiac stimulants.   Minimize or avoid  NSAID-type medications      Monitor for any signs of bleeding including red or dark stools. Fall precautions.   Advised staying uptodate with immunizations per established standard guidelines.    Offered to give patient  a copy of my notes     Questions were encouraged, asked and answered to the patient's  understanding and satisfaction. Questions if any regarding current medications and side effects, need for refills and importance of compliance to medications stressed.    Reviewed available prior " notes, consults, prior visits, laboratory findings, radiology and cardiology relevant reports. Updated chart as applicable. I have reviewed the patient's medical history in detail and updated the computerized patient record as relevant.      Updated patient regarding any new or relevant abnormalities on review of records or any new findings on physical exam. Mentioned to patient about purpose of visit and desirable health short and long term goals and objectives.    Primary to monitor CBC CMP Lipid panel and TSH as applicable    ___________________________________________________________________________________________________________________________________________     ECG 12 Lead    Date/Time: 6/9/2022 9:57 AM  Performed by: Lauro Jean MD  Authorized by: Lauro Jean MD   Comparison: compared with previous ECG from 6/21/2019  Similar to previous ECG  Comparison to previous ECG:    Rhythm: sinus rhythm  Rate: normal  Conduction: conduction normal  ST Segments: ST segments normal  T Waves: T waves normal  QRS axis: normal  Other findings: left atrial abnormality    Clinical impression: normal ECG            Assessment & Plan   Diagnoses and all orders for this visit:    1. Charcot-Claire-Tooth disease (Primary)    2. Chronic obstructive pulmonary disease, unspecified COPD type (HCC)    3. History of COVID-19    4. Primary hypertension    5. Hypertriglyceridemia    6. History of chronic respiratory failure    7. Palpitations  -     Adult Transthoracic Echo Complete w/ Color, Spectral and Contrast if necessary per protocol; Future  -     Holter Monitor - 72 Hour Up To 15 Days; Future    8. BEJARANO (dyspnea on exertion)  -     Adult Stress Echo W/ Cont or Stress Agent if Necessary Per Protocol; Future    9. Coronary artery calcification on CT chest     10. Other hemochromatosis    11. Spinal stenosis in cervical region    Other orders  -     ECG 12 Lead          Plan    Orders Placed This Encounter   Procedures   •  Holter Monitor - 72 Hour Up To 15 Days     Standing Status:   Future     Standing Expiration Date:   6/9/2023     Order Specific Question:   Reason for exam?     Answer:   Palpitations     Order Specific Question:   Release to patient     Answer:   Immediate     Order Specific Question:   How many days is the patient to wear the monitor?     Answer:   14   • ECG 12 Lead     This order was created via procedure documentation     Order Specific Question:   Release to patient     Answer:   Immediate   • Adult Transthoracic Echo Complete w/ Color, Spectral and Contrast if necessary per protocol     Standing Status:   Future     Standing Expiration Date:   6/9/2023     Scheduling Instructions:      Myocardial strain to be performed during echocardiogram as long as technically feasible     Order Specific Question:   Reason for exam?     Answer:   Dyspnea     Order Specific Question:   Release to patient     Answer:   Immediate   • Adult Stress Echo W/ Cont or Stress Agent if Necessary Per Protocol     Standing Status:   Future     Standing Expiration Date:   6/9/2023     Order Specific Question:   What stress agent will be used?     Answer:   Dobutamine     Order Specific Question:   Difficulty walking criteria?     Answer:   Musculoskeletal (hips, knees, feet, back, amputee)     Order Specific Question:   Reason for exam?     Answer:   Chest Pain     Order Specific Question:   Release to patient     Answer:   Immediate      Keep LDL below 70 mg/dl. Monitor liver and renal functions.   Monitor CBC, CMP, TSH (as indicated) and Lipid Panel by primary     Patient expressed understanding  Encouraged and answered all questions   Discussed with the patient and all questioned fully answered. He will call me if any problems arise.   Discussed results of electrocardiogram from today      Patient was advised to continue CPAP daily.   He nay with the CPAP      Check BP and heart rates twice daily initially till blood pressures  and heart rates under good control and then at least 3x / week,   If blood pressures continue to be well-controlled then can check week a month  at home and bring a recording for review next visit  If BP >130/85 or < 100/60 persistently over 3 reading 30 mins apart or if heart rates persistently above 100 bpm or less than 55 bpm call sooner for evaluation and advise     Follow up with Carie LIPSCOMB , Tony LIPSCOMB, Jessica Avina PA-C  or myself          Return in about 8 weeks (around 8/4/2022).

## 2022-06-09 NOTE — TELEPHONE ENCOUNTER
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  He was afraid it would come off and he would loose it.  He wants to wait until the fall when it gets cooler and he is not as busy to wear the monitor.  I have removed the order.  Marco A Hernandez, CMA

## 2022-06-23 ENCOUNTER — LAB (OUTPATIENT)
Dept: LAB | Facility: HOSPITAL | Age: 57
End: 2022-06-23

## 2022-06-23 ENCOUNTER — CLINICAL SUPPORT (OUTPATIENT)
Dept: ONCOLOGY | Facility: CLINIC | Age: 57
End: 2022-06-23

## 2022-06-23 VITALS
RESPIRATION RATE: 16 BRPM | DIASTOLIC BLOOD PRESSURE: 90 MMHG | HEART RATE: 95 BPM | SYSTOLIC BLOOD PRESSURE: 140 MMHG | OXYGEN SATURATION: 97 %

## 2022-06-23 DIAGNOSIS — E83.110 HEMOCHROMATOSIS ASSOCIATED WITH COMPOUND HETEROZYGOUS MUTATION IN HFE GENE: Primary | ICD-10-CM

## 2022-06-23 DIAGNOSIS — E83.118 OTHER HEMOCHROMATOSIS: ICD-10-CM

## 2022-06-23 DIAGNOSIS — E83.118 OTHER HEMOCHROMATOSIS: Primary | ICD-10-CM

## 2022-06-23 LAB
BASOPHILS # BLD AUTO: 0.09 10*3/MM3 (ref 0–0.2)
BASOPHILS NFR BLD AUTO: 1.4 % (ref 0–1.5)
DEPRECATED RDW RBC AUTO: 43.5 FL (ref 37–54)
EOSINOPHIL # BLD AUTO: 0.29 10*3/MM3 (ref 0–0.4)
EOSINOPHIL NFR BLD AUTO: 4.5 % (ref 0.3–6.2)
ERYTHROCYTE [DISTWIDTH] IN BLOOD BY AUTOMATED COUNT: 12.8 % (ref 12.3–15.4)
FERRITIN SERPL-MCNC: 338.6 NG/ML (ref 30–400)
HCT VFR BLD AUTO: 46 % (ref 37.5–51)
HGB BLD-MCNC: 15.4 G/DL (ref 13–17.7)
IMM GRANULOCYTES # BLD AUTO: 0.04 10*3/MM3 (ref 0–0.05)
IMM GRANULOCYTES NFR BLD AUTO: 0.6 % (ref 0–0.5)
LYMPHOCYTES # BLD AUTO: 1.39 10*3/MM3 (ref 0.7–3.1)
LYMPHOCYTES NFR BLD AUTO: 21.6 % (ref 19.6–45.3)
MCH RBC QN AUTO: 31.1 PG (ref 26.6–33)
MCHC RBC AUTO-ENTMCNC: 33.5 G/DL (ref 31.5–35.7)
MCV RBC AUTO: 92.9 FL (ref 79–97)
MONOCYTES # BLD AUTO: 0.79 10*3/MM3 (ref 0.1–0.9)
MONOCYTES NFR BLD AUTO: 12.3 % (ref 5–12)
NEUTROPHILS NFR BLD AUTO: 3.84 10*3/MM3 (ref 1.7–7)
NEUTROPHILS NFR BLD AUTO: 59.6 % (ref 42.7–76)
NRBC BLD AUTO-RTO: 0 /100 WBC (ref 0–0.2)
PLATELET # BLD AUTO: 249 10*3/MM3 (ref 140–450)
PMV BLD AUTO: 10.1 FL (ref 6–12)
RBC # BLD AUTO: 4.95 10*6/MM3 (ref 4.14–5.8)
WBC NRBC COR # BLD: 6.44 10*3/MM3 (ref 3.4–10.8)
WHOLE BLOOD HOLD SPECIMEN: NORMAL

## 2022-06-23 PROCEDURE — 82728 ASSAY OF FERRITIN: CPT

## 2022-06-23 PROCEDURE — 85025 COMPLETE CBC W/AUTO DIFF WBC: CPT

## 2022-06-23 PROCEDURE — 36415 COLL VENOUS BLD VENIPUNCTURE: CPT

## 2022-06-23 PROCEDURE — 99195 PHLEBOTOMY: CPT | Performed by: NURSE PRACTITIONER

## 2022-06-23 NOTE — PROGRESS NOTES
Patient here for lab evaluation for possible 500 ml phlebotomy for hemachromatosis with goal ferritin <50.  Patient states that he is feeling anxious today.  Skin warm, dry, and color within normal limits.  Labs reviewed hemoglobin 15.4 and ferritin 338.60.  500 ml phlebotomy performed per right antecubital.  Patient tolerated without any difficulty.  Denies being lightheaded or dizzy upon standing.

## 2022-06-23 NOTE — PROGRESS NOTES
Reviewed results ordered by other provider.  Per other provider till seen.    Electronically signed by RUEL Lyman, 06/23/22, 1:02 PM CDT.

## 2022-07-01 DIAGNOSIS — E83.110 HEMOCHROMATOSIS ASSOCIATED WITH COMPOUND HETEROZYGOUS MUTATION IN HFE GENE: Primary | ICD-10-CM

## 2022-07-05 ENCOUNTER — LAB (OUTPATIENT)
Dept: FAMILY MEDICINE CLINIC | Facility: CLINIC | Age: 57
End: 2022-07-05

## 2022-07-05 DIAGNOSIS — I10 PRIMARY HYPERTENSION: Primary | ICD-10-CM

## 2022-07-05 DIAGNOSIS — E83.118 OTHER HEMOCHROMATOSIS: ICD-10-CM

## 2022-07-05 DIAGNOSIS — Z00.00 WELLNESS EXAMINATION: ICD-10-CM

## 2022-07-05 DIAGNOSIS — R79.89 HYPOURICEMIA: ICD-10-CM

## 2022-07-05 DIAGNOSIS — E78.1 HYPERTRIGLYCERIDEMIA: ICD-10-CM

## 2022-07-05 DIAGNOSIS — E61.1 IRON DEFICIENCY: ICD-10-CM

## 2022-07-05 DIAGNOSIS — F41.9 ANXIETY: ICD-10-CM

## 2022-07-05 DIAGNOSIS — E55.9 VITAMIN D DEFICIENCY, UNSPECIFIED: ICD-10-CM

## 2022-07-07 ENCOUNTER — OFFICE VISIT (OUTPATIENT)
Dept: FAMILY MEDICINE CLINIC | Facility: CLINIC | Age: 57
End: 2022-07-07

## 2022-07-07 ENCOUNTER — OFFICE VISIT (OUTPATIENT)
Dept: ONCOLOGY | Facility: CLINIC | Age: 57
End: 2022-07-07

## 2022-07-07 ENCOUNTER — LAB (OUTPATIENT)
Dept: LAB | Facility: HOSPITAL | Age: 57
End: 2022-07-07

## 2022-07-07 VITALS
TEMPERATURE: 97.3 F | HEART RATE: 102 BPM | BODY MASS INDEX: 29.95 KG/M2 | WEIGHT: 190.8 LBS | SYSTOLIC BLOOD PRESSURE: 132 MMHG | DIASTOLIC BLOOD PRESSURE: 78 MMHG | HEIGHT: 67 IN | RESPIRATION RATE: 16 BRPM | OXYGEN SATURATION: 96 %

## 2022-07-07 VITALS
HEART RATE: 114 BPM | BODY MASS INDEX: 29.82 KG/M2 | OXYGEN SATURATION: 97 % | DIASTOLIC BLOOD PRESSURE: 78 MMHG | TEMPERATURE: 97.2 F | HEIGHT: 67 IN | WEIGHT: 190 LBS | SYSTOLIC BLOOD PRESSURE: 128 MMHG | RESPIRATION RATE: 16 BRPM

## 2022-07-07 DIAGNOSIS — J38.02 BILATERAL VOCAL CORD PARESIS: ICD-10-CM

## 2022-07-07 DIAGNOSIS — Z14.8 CARRIER OF HEMOCHROMATOSIS HFE GENE MUTATION: ICD-10-CM

## 2022-07-07 DIAGNOSIS — J44.9 CHRONIC OBSTRUCTIVE PULMONARY DISEASE, UNSPECIFIED COPD TYPE: ICD-10-CM

## 2022-07-07 DIAGNOSIS — I10 HYPERTENSION, UNSPECIFIED TYPE: ICD-10-CM

## 2022-07-07 DIAGNOSIS — E83.110 HEMOCHROMATOSIS ASSOCIATED WITH COMPOUND HETEROZYGOUS MUTATION IN HFE GENE: Primary | ICD-10-CM

## 2022-07-07 DIAGNOSIS — I10 PRIMARY HYPERTENSION: ICD-10-CM

## 2022-07-07 DIAGNOSIS — R06.1 STRIDOR: ICD-10-CM

## 2022-07-07 DIAGNOSIS — E61.1 IRON DEFICIENCY: ICD-10-CM

## 2022-07-07 DIAGNOSIS — R73.01 ELEVATED FASTING GLUCOSE: ICD-10-CM

## 2022-07-07 DIAGNOSIS — N52.9 ERECTILE DYSFUNCTION, UNSPECIFIED ERECTILE DYSFUNCTION TYPE: ICD-10-CM

## 2022-07-07 DIAGNOSIS — Z86.16 HISTORY OF COVID-19: ICD-10-CM

## 2022-07-07 DIAGNOSIS — E79.0 HYPERURICEMIA: ICD-10-CM

## 2022-07-07 DIAGNOSIS — R05.9 COUGH: ICD-10-CM

## 2022-07-07 DIAGNOSIS — K76.0 FATTY LIVER: ICD-10-CM

## 2022-07-07 DIAGNOSIS — K21.9 GASTROESOPHAGEAL REFLUX DISEASE, UNSPECIFIED WHETHER ESOPHAGITIS PRESENT: ICD-10-CM

## 2022-07-07 DIAGNOSIS — E78.1 HYPERTRIGLYCERIDEMIA: ICD-10-CM

## 2022-07-07 DIAGNOSIS — E83.110 HEMOCHROMATOSIS ASSOCIATED WITH COMPOUND HETEROZYGOUS MUTATION IN HFE GENE: ICD-10-CM

## 2022-07-07 DIAGNOSIS — G60.0 CHARCOT-MARIE-TOOTH DISEASE: ICD-10-CM

## 2022-07-07 DIAGNOSIS — R49.0 HOARSENESS: ICD-10-CM

## 2022-07-07 LAB
BASOPHILS # BLD AUTO: 0.06 10*3/MM3 (ref 0–0.2)
BASOPHILS NFR BLD AUTO: 0.9 % (ref 0–1.5)
DEPRECATED RDW RBC AUTO: 42.1 FL (ref 37–54)
EOSINOPHIL # BLD AUTO: 0.28 10*3/MM3 (ref 0–0.4)
EOSINOPHIL NFR BLD AUTO: 4.4 % (ref 0.3–6.2)
ERYTHROCYTE [DISTWIDTH] IN BLOOD BY AUTOMATED COUNT: 12.9 % (ref 12.3–15.4)
FERRITIN SERPL-MCNC: 217.7 NG/ML (ref 30–400)
HCT VFR BLD AUTO: 43.2 % (ref 37.5–51)
HGB BLD-MCNC: 14.8 G/DL (ref 13–17.7)
IMM GRANULOCYTES # BLD AUTO: 0.04 10*3/MM3 (ref 0–0.05)
IMM GRANULOCYTES NFR BLD AUTO: 0.6 % (ref 0–0.5)
LYMPHOCYTES # BLD AUTO: 1.31 10*3/MM3 (ref 0.7–3.1)
LYMPHOCYTES NFR BLD AUTO: 20.4 % (ref 19.6–45.3)
MCH RBC QN AUTO: 31.2 PG (ref 26.6–33)
MCHC RBC AUTO-ENTMCNC: 34.3 G/DL (ref 31.5–35.7)
MCV RBC AUTO: 90.9 FL (ref 79–97)
MONOCYTES # BLD AUTO: 0.67 10*3/MM3 (ref 0.1–0.9)
MONOCYTES NFR BLD AUTO: 10.4 % (ref 5–12)
NEUTROPHILS NFR BLD AUTO: 4.06 10*3/MM3 (ref 1.7–7)
NEUTROPHILS NFR BLD AUTO: 63.3 % (ref 42.7–76)
NRBC BLD AUTO-RTO: 0 /100 WBC (ref 0–0.2)
PLATELET # BLD AUTO: 256 10*3/MM3 (ref 140–450)
PMV BLD AUTO: 10 FL (ref 6–12)
RBC # BLD AUTO: 4.75 10*6/MM3 (ref 4.14–5.8)
WBC NRBC COR # BLD: 6.42 10*3/MM3 (ref 3.4–10.8)

## 2022-07-07 PROCEDURE — 85025 COMPLETE CBC W/AUTO DIFF WBC: CPT

## 2022-07-07 PROCEDURE — 82728 ASSAY OF FERRITIN: CPT

## 2022-07-07 PROCEDURE — 99195 PHLEBOTOMY: CPT | Performed by: NURSE PRACTITIONER

## 2022-07-07 PROCEDURE — 36415 COLL VENOUS BLD VENIPUNCTURE: CPT

## 2022-07-07 PROCEDURE — 99214 OFFICE O/P EST MOD 30 MIN: CPT | Performed by: FAMILY MEDICINE

## 2022-07-07 PROCEDURE — 99214 OFFICE O/P EST MOD 30 MIN: CPT | Performed by: NURSE PRACTITIONER

## 2022-07-07 NOTE — PROGRESS NOTES
Subjective   Iam Gibbs is a 57 y.o. male presenting with chief complaint of:   Chief Complaint   Patient presents with   • Follow-up       History of Present Illness :  Alone.     Has multiple chronic problems to consider that might have a bearing on today's issues;  an interval appointment.       Chronic/acute problems reviewed today:   1. Hypertriglyceridemia: Chronic/variable  Tolerated use of Rx with labs showing improved lipid values past and tolerant liver labs. No muscle aches unexpected.  Advising weight loss/8%     2. Primary hypertension: Chronic/stable. Stable here past/no recent home blood pressures.  No significant chest pain, SOB, LE edema, orthopnea, near syncope, dizziness/light headness.   Recent Vitals       6/9/2022 6/23/2022 7/7/2022       BP: 152/98 140/90 132/78     Pulse: 99 95 102     Temp: -- -- 97.3 °F (36.3 °C)     Weight: 83.9 kg (185 lb) -- 86.5 kg (190 lb 12.8 oz)     BMI (Calculated): 29 -- 29.9            3. 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 Gastroesophageal reflux disease, unspecified whether esophagitis present: Chronic/stable.  Controlled heartburn, reflux without dysphagia, melena.  Rx used, periods not used proven needed with symptoms -currently doing ok.      5. *Hx iron deficiency: Chronic or past history/stable: This has been present before.    There has been GI evaulation in the past. There is no current melena, hematochezia. It has been benign to date and stable/watching.  Contributing comorbidities to date: hemachromatosis.     6. Hyperuricemia-zyloprim: Chronic/stable.  Rx improved uric acid levels with no flares of gout.  Rx tolerated.     7. Chronic obstructive pulmonary disease, unspecified COPD type (HCC): Chronic/stable mild occ cough, sob, wheeze.  Rx ? helps.  No smoking.       8. Fatty liver: Chronic/stable.  Aware treatment involves normalizing weight; usually including low fat  diet and regular exercise.  Aware condition can go on to cirrhosis and death.  Stable occ liver labs and past imaging.       9. Elevated fasting glucose: Chronic/stable.   No problem/pattern hypoglycemia/hyperglycemia manifest by poly- dypsia, phagia, uria, or sweats, diaphoretic episodes, syncope/near.     10. History of COVID-19:   7.6.21 moderna  8.6.21 moderna  1.21.22 + covid  No infusion/no pills   11. Cough: chronic/worse since covid.  Feels a lot related to unability to clear upper secretions   12. Hoarseness: chronic/stable   13. Stridor-infrequent : see #14; none   14. 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  No worse.  Since covid more cough.    15. Erectile dysfunction, unspecified erectile dysfunction type: chronic/variable.  Recently some improvement.  Causes include: anxiety as part.        Has an/another acute issue today: none.    The following portions of the patient's history were reviewed and updated as appropriate: allergies, current medications, past family history, past medical history, past social history, past surgical history and problem list.      Current Outpatient Medications:   •  acetaminophen (TYLENOL) 500 MG tablet, Take 500 mg by mouth Every 6 (Six) Hours As Needed for Mild Pain ., Disp: , Rfl:   •  albuterol (PROVENTIL) (2.5 MG/3ML) 0.083% nebulizer solution, Take 2.5 mg by nebulization 3 (Three) Times a Day., Disp: 90 each, Rfl: 1  •  albuterol sulfate HFA (Ventolin HFA) 108 (90 Base) MCG/ACT inhaler, Inhale 2 puffs Every 4 (Four) Hours As Needed for Wheezing or Shortness of Air., Disp: 18 g, Rfl: 5  •  allopurinol (ZYLOPRIM) 100 MG tablet, Take 1 tablet by mouth Daily., Disp: 90 tablet, Rfl: 1  •  Budeson-Glycopyrrol-Formoterol (Breztri Aerosphere) 160-9-4.8 MCG/ACT aerosol inhaler, Inhale 2 puffs 2 (Two) Times a Day., Disp: 10.7 each, Rfl: 5  •  buPROPion XL (WELLBUTRIN XL) 150 MG 24 hr tablet, TAKE ONE (1) TABLET BY MOUTH DAILY., Disp: 30 tablet, Rfl:  5  •  busPIRone (BUSPAR) 15 MG tablet, TAKE 1/2 TABLET BY MOUTH TWICE DAILY, Disp: 30 tablet, Rfl: 2  •  gemfibrozil (LOPID) 600 MG tablet, TAKE ONE (1) TABLET BY MOUTH DAILY. NEEDS OFFICE APPT., Disp: 90 tablet, Rfl: 4  •  hydroCHLOROthiazide (HYDRODIURIL) 25 MG tablet, TAKE ONE (1) TABLET BY MOUTH DAILY, Disp: 90 tablet, Rfl: 1  •  ibuprofen (ADVIL,MOTRIN) 600 MG tablet, Take 600 mg by mouth Every 6 (Six) Hours As Needed for Mild Pain ., Disp: , Rfl:   •  losartan (COZAAR) 25 MG tablet, TAKE ONE (1) TABLET BY MOUTH DAILY., Disp: 90 tablet, Rfl: 4  •  pantoprazole (PROTONIX) 40 MG EC tablet, TAKE ONE (1) TABLET BY MOUTH DAILY, Disp: 90 tablet, Rfl: 3  •  ALPRAZolam (Xanax) 0.5 MG tablet, Take 1 tablet by mouth 2 (Two) Times a Day As Needed for Anxiety. (Patient taking differently: Take 0.5 mg by mouth As Needed for Anxiety.), Disp: 30 tablet, Rfl: 0  •  cetirizine (ZyrTEC Allergy) 10 MG tablet, Take 1 tablet by mouth Daily., Disp: 30 tablet, Rfl: 1  •  tadalafil (Cialis) 20 MG tablet, Take 1 tablet by mouth Daily As Needed for Erectile Dysfunction., Disp: 30 tablet, Rfl: 1    No problems with medications.    Allergies   Allergen Reactions   • Adhesive Tape Rash     VERY SENSITIVE TO ADHESIVE    • Latex Rash and Unknown - High Severity       Review of Systems  GENERAL:  Inactive/slower with limits, speed, stamina for age and UE/LE weakness. Sleep is ok; apnea denied. No fevers.  SKIN: No rash/skin lesion of concern.   ENDO:  No syncope, near or diaphoretic sweaty spells.  BS never an issue.  HEENT: No recent head injury; or headache.  No vision change.  No significant hearing loss.  Ears without pain/drainage.  No sore throat.  No significant nasal/sinus congestion/drainage. No epistaxis.  CHEST: No chest wall tenderness or mass.  Recent on/off cough, with occ wheeze.  No SOB; no hemoptysis.  CV: No exertional chest pain, palpitations, ankle edema.  GI: No dysphagia or heartburn.  No abdominal pain, diarrhea,  constipation.  No rectal bleeding, or melena.  No colonoscopy.   :  Voids without dysuria, or incontinence to completion.  ORTHO: No painful/swollen joints but various on /off sore.  Same daily sore neck or back.  No acute neck or back pain without recent injury.  Inversion injury L ankle/above.   NEURO: No focal/significant weakness of extremities.  No dizziness.   Occ UE/LE numbness/paresthesias.   PSYCH: No memory loss.  Mood good; not that anxious, depressed but/and not suicidal.  Tries to tolerate stress .   Screening:  Mammogram: NA  Bone density: NA  Low dose CT chest: Tobacco-smoker/age 17/1ppd/dc 2015 (35):   1.19.22  IMPRESSION:  1. No suspicious pulmonary nodule.  2. Lung-RADS 1: Negative  No nodules and definitely benign nodules.  Continue annual screening with low-dose CT in 12 months.  GI:   Colon-p,hem/[facility]/JAMES/2.17.20/5y  EGD-nl/JAMES/[facility]/2.25.20  Prostate: offered 1.3.22/declined  Usual lab order  12m CBC, CMP, LIPID, TSH, fT4, Vit D, B12, folate, iron, PSAs, sed rate, CR-protein    Copy/paste function used for ROS/exam AND each area of these were reviewed, updated, confirmed and supplemented as needed.   Data reviewed:   Recent admit/ER/MD visits: last visit  Last cardiac testing:   Echo: none    Radiology considered:   CT Abdomen Without Contrast    Result Date: 5/27/2022  1. Fatty infiltration of the liver. 2. Mild splenomegaly. 3. Atheromatous disease of the aortoiliac vessels. Degenerative changes of the visualized spine. This report was finalized on 05/27/2022 16:24 by Dr. Devon Davis MD.    Lab Results:  Results for orders placed or performed in visit on 07/05/22   Comprehensive Metabolic Panel    Specimen: Blood    Blood  Manual Differen   Result Value Ref Range    Glucose 104 (H) 65 - 99 mg/dL    BUN 14 6 - 20 mg/dL    Creatinine 0.92 0.76 - 1.27 mg/dL    EGFR Result 98.2 >60.0 mL/min/1.73    BUN/Creatinine Ratio 15.2 7.0 - 25.0    Sodium 142 136 - 145 mmol/L    Potassium 4.1 3.5 -  5.2 mmol/L    Chloride 99 98 - 107 mmol/L    Total CO2 30.2 (H) 22.0 - 29.0 mmol/L    Calcium 9.6 8.6 - 10.5 mg/dL    Total Protein 7.0 6.0 - 8.5 g/dL    Albumin 4.70 3.50 - 5.20 g/dL    Globulin 2.3 gm/dL    A/G Ratio 2.0 g/dL    Total Bilirubin 0.4 0.0 - 1.2 mg/dL    Alkaline Phosphatase 54 39 - 117 U/L    AST (SGOT) 19 1 - 40 U/L    ALT (SGPT) 24 1 - 41 U/L   Lipid Panel With LDL / HDL Ratio    Specimen: Blood    Blood  Manual Differen   Result Value Ref Range    Total Cholesterol 220 (H) 0 - 200 mg/dL    Triglycerides 416 (H) 0 - 150 mg/dL    HDL Cholesterol 36 (L) 40 - 60 mg/dL    VLDL Cholesterol [place] 72 (H) 5 - 40 mg/dL    LDL Chol Calc (NIH) 112 (H) 0 - 100 mg/dL    LDL/HDL RATIO 2.80    TSH    Specimen: Blood    Blood  Manual Differen   Result Value Ref Range    TSH 2.020 0.270 - 4.200 uIU/mL   T4, free    Specimen: Blood    Blood  Manual Differen   Result Value Ref Range    Free T4 1.20 0.93 - 1.70 ng/dL   Vitamin B12    Specimen: Blood    Blood  Manual Differen   Result Value Ref Range    Vitamin B-12 371 211 - 946 pg/mL   Folate    Specimen: Blood    Blood  Manual Differen   Result Value Ref Range    Folate 9.57 4.78 - 24.20 ng/mL   Vitamin D 25 Hydroxy    Specimen: Blood    Blood  Manual Differen   Result Value Ref Range    25 Hydroxy, Vitamin D 37.0 30.0 - 100.0 ng/ml   Iron    Specimen: Blood    Blood  Manual Differen   Result Value Ref Range    Iron 71 59 - 158 mcg/dL   Uric Acid    Specimen: Blood    Blood  Manual Differen   Result Value Ref Range    Uric Acid 7.0 3.4 - 7.0 mg/dL   Sedimentation Rate    Specimen: Blood    Blood  Manual Differen   Result Value Ref Range    Sed Rate 5 0 - 20 mm/hr   C-reactive Protein    Specimen: Blood    Blood  Manual Differen   Result Value Ref Range    C-Reactive Protein <0.30 0.00 - 0.50 mg/dL   CBC & Differential    Blood  Manual Differen   Result Value Ref Range    WBC 6.78 3.40 - 10.80 10*3/mm3    RBC 5.03 4.14 - 5.80 10*6/mm3    Hemoglobin 15.7 13.0 - 17.7  g/dL    Hematocrit 46.5 37.5 - 51.0 %    MCV 92.4 79.0 - 97.0 fL    MCH 31.2 26.6 - 33.0 pg    MCHC 33.8 31.5 - 35.7 g/dL    RDW 12.9 12.3 - 15.4 %    Platelets 265 140 - 450 10*3/mm3    Neutrophil Rel % 56.6 42.7 - 76.0 %    Lymphocyte Rel % 24.6 19.6 - 45.3 %    Monocyte Rel % 11.2 5.0 - 12.0 %    Eosinophil Rel % 5.8 0.3 - 6.2 %    Basophil Rel % 1.2 0.0 - 1.5 %    Neutrophils Absolute 3.84 1.70 - 7.00 10*3/mm3    Lymphocytes Absolute 1.67 0.70 - 3.10 10*3/mm3    Monocytes Absolute 0.76 0.10 - 0.90 10*3/mm3    Eosinophils Absolute 0.39 0.00 - 0.40 10*3/mm3    Basophils Absolute 0.08 0.00 - 0.20 10*3/mm3    Immature Granulocyte Rel % 0.6 (H) 0.0 - 0.5 %    Immature Grans Absolute 0.04 0.00 - 0.05 10*3/mm3    nRBC 0.0 0.0 - 0.2 /100 WBC       A1C:No results for input(s): HGBA1C in the last 24575 hours.  GLUCOSE:  Lab Results - Last 18 Months   Lab Units 07/05/22  0730 05/26/22  0903 04/22/22  1008 01/03/22  0853 07/20/21  1015   GLUCOSE mg/dL 104* 90 112* 94 80     LIPID:  Lab Results - Last 18 Months   Lab Units 07/05/22  0730 07/20/21  1015   CHOLESTEROL mg/dL 220* 174   LDL CHOL mg/dL 112* 100   HDL CHOL mg/dL 36* 35*   TRIGLYCERIDES mg/dL 416* 230*     PSA:  Lab Results - Last 18 Months   Lab Units 01/03/22  0853   PSA ng/mL 0.649       CBC:  Lab Results - Last 18 Months   Lab Units 07/05/22  0730 06/23/22  1056 06/09/22  1143 05/26/22  0903 04/22/22  1008 01/03/22  0853 07/20/21  1015   WBC 10*3/mm3 6.78 6.44 7.84 5.43 7.56 7.03 5.82   HEMOGLOBIN g/dL 15.7 15.4 16.0 15.3 16.4 17.3 16.5   HEMATOCRIT % 46.5 46.0 47.4 44.6 48.1 51.4* 48.3   PLATELETS 10*3/mm3 265 249 250 217 229 251 239   IRON mcg/dL 71  --   --   --  120 99  --       BMP/CMP:  Lab Results - Last 18 Months   Lab Units 07/05/22  0730 05/26/22  0903 04/22/22  1008 01/03/22  0853 07/20/21  1015   SODIUM mmol/L 142 138 140 138 138   POTASSIUM mmol/L 4.1 3.8 4.1 4.2 4.1   CHLORIDE mmol/L 99 102 101 98 96*   TOTAL CO2 mmol/L 30.2*  --   --  34.0* 30.3*  "  CO2 mmol/L  --  28.0 31.0*  --   --    GLUCOSE mg/dL 104* 90 112* 94 80   BUN mg/dL 14 26* 19 15 16   CREATININE mg/dL 0.92 0.95 0.85 1.00 0.88   EGFR IF NONAFRICN AM mL/min/1.73  --   --   --  78 90   EGFR IF AFRICN AM mL/min/1.73  --   --   --  94 109   EGFR RESULT mL/min/1.73 98.2  --   --   --   --    CALCIUM mg/dL 9.6 8.9 9.6 9.7 9.6     HEPATIC:  Lab Results - Last 18 Months   Lab Units 07/05/22  0730 05/26/22  0903 04/22/22  1008 01/03/22  0853 07/20/21  1015   ALT (SGPT) U/L 24 20 24 32 16   AST (SGOT) U/L 19 15 19 20 15   ALK PHOS U/L 54 50 47 55 58     Vit D:  Lab Results - Last 18 Months   Lab Units 07/05/22  0730   VIT D 25 HYDROXY ng/ml 37.0     THYROID:  Lab Results - Last 18 Months   Lab Units 07/05/22  0730 01/03/22  0853 07/20/21  1015   TSH uIU/mL 2.020 1.240 0.979   FREE T4 ng/dL 1.20 1.27  --        Objective   /78 (BP Location: Right arm, Patient Position: Sitting, Cuff Size: Large Adult)   Pulse 102   Temp 97.3 °F (36.3 °C) (Infrared)   Resp 16   Ht 170.2 cm (67\")   Wt 86.5 kg (190 lb 12.8 oz)   SpO2 96%   BMI 29.88 kg/m²   Body mass index is 29.88 kg/m².    Recent Vitals       5/26/2022 6/9/2022 6/23/2022       BP: 120/82 152/98 140/90     Pulse: 84 99 95     Temp: 98 °F (36.7 °C) -- --     Weight: 84.4 kg (186 lb 1.6 oz) 83.9 kg (185 lb) --     BMI (Calculated): 29.1 29 --           Physical Exam  GENERAL:  Well nourished/developed in no acute distress.   SKIN: Turgor ok without wound, rash, lesion  HEENT: Normal cephalic without trauma.  Pupils equal round reactive to light. Extraocular motions full without nystagmus.   External canals nonobstructive nontender without reddness. Tymphatic membranes bernard with yazan structures intact.   Oral cavity without growths, exudates, and moist.  Posterior pharynx without mass, obstruction, redness.  No thyromegaly, mass, tenderness, lymphadenopathy and supple.  CV: Regular rhythm.  No murmur, gallop,  edema. Posterior pulses intact.  No " carotid bruits.  CHEST: No chest wall tenderness or mass.   LUNGS: Symmetric motion with clear/decreased to auscultation.  No dullness to percussion  ABD: Soft, nontender without mass.   ORTHO: Symmetric extremities without swelling/point tenderness except L ankle swollen; sore anterior/lateral ankle to touch/movement which is full.   Other full gross range of motion.  Walking without assistive device.   NEURO: CN 2-12 grossly intact.  Symmetric facies and UE/LE.  1-2/5 strength throughout. 1/4 x bicep equal reflexes. Marked hand/foot muscle atrophy.  Nonfocal use extremities. Speech clear.  Reduced light touch with monofilament, vibratory sensation with tuning fork; equal toes/distal feet.    PSYCH: Oriented x 3.  Pleasant calm, well kept.  Purposeful/directed conservation with intact short/long gross memory.     Assessment & Plan     1. Hypertriglyceridemia    2. Primary hypertension    3. 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 Gastroesophageal reflux disease, unspecified whether esophagitis present    5. *Hx iron deficiency    6. Hyperuricemia-zyloprim    7. Chronic obstructive pulmonary disease, unspecified COPD type (HCC)    8. Fatty liver    9. Elevated fasting glucose    10. History of COVID-19    11. Cough    12. Hoarseness    13. Stridor-infrequent    14. 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 Erectile dysfunction, unspecified erectile dysfunction type        Discussions/medical decisions/reviews:  BP ok  Other vitals ok  DM/BS stable pattern-preDM  Lipid /trig 416  PSA ok last  CBC ok  Renal ok  Liver ok  Vit D ok  Thyroid ok    Weight loss advised; 8% in studies; CT abdomen shows fatty liver AND ? Spleenomegaly.  Thankfully liver test ok; but needs close f/u; 1y repeat US liver  Continue to follow with hematology  Augmentation of ED Rx with ring discussed; avoid prolonged application and needs to be firm but not too tight  Pro/con referral to pulmonary;  opinions on residual covid cough    Medical decision issues:   Data review above:   Rx: reviewed and decisions:   Visit today involved chronic significant medical problems or differentials and/or intensive drug monitoring: ie potential to cause serious morbidity or death:   Rx changes: none  No orders of the defined types were placed in this encounter.      Orders placed:   LAB/Testing/Referrals: reviewed/orders:   Today:   Orders Placed This Encounter   Procedures   • Ambulatory Referral to Pulmonology     Chronic/recurrent labs above or change to:   6m CBC, CMP, A1c, ferritin, iron  12m CBC, CMP, A1c, LIPID, TSH, fT4, Vit D, B12, folate, iron, ferritin, PSAs, sed rate, CR-protein, uric acid    Screening reviewed/updated     Immunization History   Administered Date(s) Administered   • COVID-19 (MODERNA) 1st, 2nd, 3rd Dose Only 07/06/2021, 08/06/2021     Vaccine reviewed: today none; later we advised/reaffirmed our support/suggestion for staying complete with covid- covid boosters, seasonal flu/yearly and any missing vaccine from list we supplied; we suggest contact with local health department office to review missing/needed vaccines and then bring nursing documentation for these vaccines to this office.     Health maintenance:   Body mass index is 29.88 kg/m².  BMI is >= 25 and <30. (Overweight) The following options were offered after discussion;: exercise counseling/recommendations, nutrition counseling/recommendations and 8% for fatty liver    Tobacco use reviewed:   Ima Gibbs  reports that he quit smoking about 6 years ago. His smoking use included cigars. He quit smokeless tobacco use about 22 years ago..     There are no Patient Instructions on file for this visit.    Follow up: Return for lab/Dr Choi 6m.  Future Appointments   Date Time Provider Department Center   7/7/2022 12:30 PM  [facility]  [facility]   7/7/2022  1:00 PM Nat Paez APRN [facility]   8/10/2022 10:00  AM [facility] ECHO ROOM 1  [facility]   8/10/2022 11:00 AM [facility] LAB 1  [facility]   8/25/2022  9:45 AM Lauro Jean MD [facility]  [facility]   1/9/2023  9:00 AM [facility]   1/9/2023  9:30 AM Nick Jackson MD [facility]

## 2022-07-07 NOTE — PROGRESS NOTES
[facility] HEMATOLOGY & ONCOLOGY  [address]  [place] [address] [phone]    Patient Name: Iam Gibbs  Encounter Date: 07/07/2022  YOB: 1965  Patient Number: [id number]    Hematology / Oncology Progress Note    HPI / REASON FOR VISIT: Iam Gibbs is a 57 y.o. male who is followed by this office for elevated iron with Hemochromatosis heterozygous with one copy of C282Y and one copy of S65C.   He is being treated with therapeutic phlebotomy q 2 weeks until his ferritin is <50.  He also has health history significant for HTN, GERD, Anxiety, Gout, erectile dysfunction., Carcot Claire Tooth, Soinal Stenosis, and COPD.       His LFT have been normal although he does drink beer regularly.  Low dose CT of chest on 01/03/22 showed no acute processes in the upper abdomen.  May 26, 2022 CT Abdomen showed 1. Fatty infiltration of the liver. 2. Mild splenomegaly.  3. Atheromatous disease of the aortoiliac vessels. Degenerative changes of the visualized spine.  He was referred to Cardiology and saw Dr. Jean on June 9, 2022.  He will have echo and follow-up at that office later this month.    He presents to clinic today for continued follow-up.  Had therapeutic phlebotomy on May 26 for ferritin of 538, June 9 ferritin of 400, June 23 for ferritin of 338.6.  He has tolerated procedures well without any signs and symptoms of hypovolemia or hypotension.  Labs reviewed today and Ferritin 217.  Will continue phlebotomy.       LABS    Lab Results - Last 18 Months   Lab Units 07/07/22  1234 07/05/22  0730 06/23/22  1056 06/09/22  1143 05/26/22  0903 04/22/22  1008   HEMOGLOBIN g/dL 14.8 15.7 15.4 16.0 15.3 16.4   HEMATOCRIT % 43.2 46.5 46.0 47.4 44.6 48.1   MCV fL 90.9 92.4 92.9 92.0 90.8 92.3   WBC 10*3/mm3 6.42 6.78 6.44 7.84 5.43 7.56   RDW % 12.9 12.9 12.8 12.8 12.3 12.7   MPV fL 10.0  --  10.1 9.7 10.2 9.7   PLATELETS  10*3/mm3 256 265 249 250 217 229   IMM GRAN % % 0.6*  --  0.6* 0.8* 0.6* 1.5*   NEUTROS ABS 10*3/mm3 4.06 3.84 3.84 5.63 3.28 4.80   LYMPHS ABS 10*3/mm3 1.31 1.67 1.39 1.10 1.15 1.41   MONOS ABS 10*3/mm3 0.67 0.76 0.79 0.79 0.67 0.89   EOS ABS 10*3/mm3 0.28 0.39 0.29 0.19 0.25 0.27   BASOS ABS 10*3/mm3 0.06 0.08 0.09 0.07 0.05 0.08   IMMATURE GRANS (ABS) 10*3/mm3 0.04  --  0.04 0.06* 0.03 0.11*   NRBC /100 WBC 0.0 0.0 0.0 0.0 0.0 0.0       Lab Results - Last 18 Months   Lab Units 07/05/22  0730 05/26/22  0903 04/22/22  1008 01/03/22  0853 07/20/21  1015   GLUCOSE mg/dL 104* 90 112* 94 80   SODIUM mmol/L 142 138 140 138 138   POTASSIUM mmol/L 4.1 3.8 4.1 4.2 4.1   TOTAL CO2 mmol/L 30.2*  --   --  34.0* 30.3*   CO2 mmol/L  --  28.0 31.0*  --   --    CHLORIDE mmol/L 99 102 101 98 96*   ANION GAP mmol/L  --  8.0 8.0  --   --    CREATININE mg/dL 0.92 0.95 0.85 1.00 0.88   BUN mg/dL 14 26* 19 15 16   BUN / CREAT RATIO  15.2 27.4* 22.4 15.0 18.2   CALCIUM mg/dL 9.6 8.9 9.6 9.7 9.6   EGFR IF NONAFRICN AM mL/min/1.73  --   --   --  78 90   ALK PHOS U/L 54 50 47 55 58   TOTAL PROTEIN g/dL  --  6.3 6.6  --   --    ALT (SGPT) U/L 24 20 24 32 16   AST (SGOT) U/L 19 15 19 20 15   BILIRUBIN mg/dL 0.4 0.4 0.4 0.5 0.7   ALBUMIN g/dL 4.70 4.50 4.60 5.10 4.80   GLOBULIN gm/dL  --  1.8 2.0  --   --        Lab Results - Last 18 Months   Lab Units 07/05/22  0730 01/03/22  0853   URIC ACID mg/dL 7.0 7.2*       Lab Results - Last 18 Months   Lab Units 07/07/22  1234 07/05/22  0730 06/23/22  1056 06/09/22  1143 05/26/22  0903 04/22/22  1008 01/03/22  0853 07/20/21  1015   IRON mcg/dL  --   --   --   --   --  120  --   --    TIBC mcg/dL  --   --   --   --   --  335 393  --    IRON SATURATION %  --   --   --   --   --  36 25  --    FERRITIN ng/mL 217.70  --  338.60 400.00 538.90* 496.30* 706.00*  --    TSH uIU/mL  --  2.020  --   --   --   --  1.240 0.979   FOLATE ng/mL  --  9.57  --   --   --   --   --   --          PAST MEDICAL  HISTORY:  ALLERGIES:  Allergies   Allergen Reactions   • Adhesive Tape Rash     VERY SENSITIVE TO ADHESIVE    • Latex Rash and Unknown - High Severity     CURRENT MEDICATIONS:  Outpatient Encounter Medications as of 7/7/2022   Medication Sig Dispense Refill   • acetaminophen (TYLENOL) 500 MG tablet Take 500 mg by mouth Every 6 (Six) Hours As Needed for Mild Pain .     • albuterol (PROVENTIL) (2.5 MG/3ML) 0.083% nebulizer solution Take 2.5 mg by nebulization 3 (Three) Times a Day. 90 each 1   • albuterol sulfate HFA (Ventolin HFA) 108 (90 Base) MCG/ACT inhaler Inhale 2 puffs Every 4 (Four) Hours As Needed for Wheezing or Shortness of Air. 18 g 5   • allopurinol (ZYLOPRIM) 100 MG tablet Take 1 tablet by mouth Daily. 90 tablet 1   • ALPRAZolam (Xanax) 0.5 MG tablet Take 1 tablet by mouth 2 (Two) Times a Day As Needed for Anxiety. (Patient taking differently: Take 0.5 mg by mouth As Needed for Anxiety.) 30 tablet 0   • Budeson-Glycopyrrol-Formoterol (Breztri Aerosphere) 160-9-4.8 MCG/ACT aerosol inhaler Inhale 2 puffs 2 (Two) Times a Day. 10.7 each 5   • buPROPion XL (WELLBUTRIN XL) 150 MG 24 hr tablet TAKE ONE (1) TABLET BY MOUTH DAILY. 30 tablet 5   • busPIRone (BUSPAR) 15 MG tablet TAKE 1/2 TABLET BY MOUTH TWICE DAILY 30 tablet 2   • cetirizine (ZyrTEC Allergy) 10 MG tablet Take 1 tablet by mouth Daily. 30 tablet 1   • gemfibrozil (LOPID) 600 MG tablet TAKE ONE (1) TABLET BY MOUTH DAILY. NEEDS OFFICE APPT. 90 tablet 4   • hydroCHLOROthiazide (HYDRODIURIL) 25 MG tablet TAKE ONE (1) TABLET BY MOUTH DAILY 90 tablet 1   • ibuprofen (ADVIL,MOTRIN) 600 MG tablet Take 600 mg by mouth Every 6 (Six) Hours As Needed for Mild Pain .     • losartan (COZAAR) 25 MG tablet TAKE ONE (1) TABLET BY MOUTH DAILY. 90 tablet 4   • pantoprazole (PROTONIX) 40 MG EC tablet TAKE ONE (1) TABLET BY MOUTH DAILY 90 tablet 3   • tadalafil (Cialis) 20 MG tablet Take 1 tablet by mouth Daily As Needed for Erectile Dysfunction. 30 tablet 1     No  facility-administered encounter medications on file as of 7/7/2022.     ADULT ILLNESSES:  Patient Active Problem List   Diagnosis Code   • Stridor-infrequent R06.1   • Jelani v cord paresis (resser) ([place]) J38.02   • Charcot-Claire-Tooth disease G60.0   • COPD: (/pulmonary) J44.9   • Gastroesophageal reflux disease K21.9   • History of tobacco use: 1.22/12m Z87.891   • BMI 28.0-28.9,adult Z68.28   • Chronic neck pain M54.2, G89.29   • Spinal stenosis in cervical region M48.02   • Cervical radiculopathy M54.12   • Chronic right shoulder pain M25.511, G89.29   • Wellness examination Z00.00   • Gout M10.9   • History of chronic respiratory failure Z87.09   • Gait difficulty R26.9   • Laboratory test Z01.89   • Hypertriglyceridemia E78.1   • *Hx iron deficiency E61.1   • Hyperuricemia-zyloprim E79.0   • Heme positive stool R19.5   • Obstructive sleep apnea (cpap) G47.33   • Hypertension I10   • High serum ferritin-see hemachromatosis R79.89   • History of COVID-19 Z86.16   • Carrier of hemochromatosis HFE gene mutation (offer referral) Z14.8   • Fatty liver K76.0   • Coronary artery calcification on CT chest  I25.10, I25.84   • Family history of early CAD grandmother  Z82.49   • Other hemochromatosis E83.118   • Elevated fasting glucose R73.01   • Erectile dysfunction N52.9     SURGERIES:  Past Surgical History:   Procedure Laterality Date   • BICEPS TENDONESIS SUBPECTORALIS REPAIR Right 6/28/2019    Procedure: BICEPS TENODESIS - RIGHT;  Surgeon: Yeison Agudelo MD;  Location: [facility] OR;  Service: Orthopedics   • COLONOSCOPY N/A 2/17/2020    Procedure: COLONOSCOPY WITH ANESTHESIA;  Surgeon: Cal Vela DO;  Location: [facility] ENDOSCOPY;  Service: Gastroenterology;  Laterality: N/A;  preop; blood in stool  postop; polyps   PCP anand Jackson    • ENDOSCOPY N/A 2/25/2020    Procedure: ESOPHAGOGASTRODUODENOSCOPY WITH ANESTHESIA;  Surgeon: Cal Vela DO;  Location: [facility] ENDOSCOPY;  Service: Gastroenterology;   Laterality: N/A;  preop; blood in stool  postop; normal   PCP Nick Jackson    • SHOULDER ARTHROSCOPY W/ ROTATOR CUFF REPAIR Right 2019    Procedure: RIGHT SHOULDER ARTHROSCOPIC ROTATOR CUFF REPAIR, SUBACROMIAL DECOMPRESSION;  Surgeon: Yeison Agudelo MD;  Location: [facility];  Service: Orthopedics     HEALTH MAINTENANCE ITEMS:  Health Maintenance Due   Topic Date Due   • Pneumococcal Vaccine 0-64 (1 - PCV) Never done   • TDAP/TD VACCINES (1 - Tdap) Never done   • ZOSTER VACCINE (1 of 2) Never done   • ANNUAL WELLNESS VISIT  Never done   • COVID-19 Vaccine (3 - Booster for Moderna series) 2022       <no information>  Last Completed Colonoscopy          COLORECTAL CANCER SCREENING (COLONOSCOPY - Every 5 Years) Next due on 2020  Surgical Procedure: COLONOSCOPY    2020  COLONOSCOPY    2020  POCT Occult blood x 3, stool              Immunization History   Administered Date(s) Administered   • COVID-19 (MODERNA) 1st, 2nd, 3rd Dose Only 2021, 2021     Last Completed Mammogram     This patient has no relevant Health Maintenance data.            FAMILY HISTORY:  Family History   Problem Relation Age of Onset   • Cancer Mother    • Diabetes Mother    • Colon polyps Mother    • Hyperlipidemia Father    • Hypertension Father    • Cancer Father    • Colon cancer Neg Hx      SOCIAL HISTORY:  Social History     Socioeconomic History   • Marital status: Single   • Years of education: 10   • Highest education level: GED or equivalent   Tobacco Use   • Smoking status: Former Smoker     Types: Cigars     Quit date: 10/2015     Years since quittin.7   • Smokeless tobacco: Former User     Quit date:    Vaping Use   • Vaping Use: Never used   Substance and Sexual Activity   • Alcohol use: Yes     Alcohol/week: 3.0 standard drinks     Types: 3 Cans of beer per week     Comment: weekly   • Drug use: Yes     Types: Marijuana   • Sexual activity: Yes     Partners: Female  "      REVIEW OF SYSTEMS:  Review of Systems  Constitutional: Positive for fatigue.   Respiratory: Positive for shortness of breath. Negative for cough.    Musculoskeletal: Positive for gait problem.   Neurological: Negative for dizziness and confusion.   Hematological: Does not bruise/bleed easily.   Psychiatric/Behavioral: Negative for suicidal ideas and depressed mood.     /78   Pulse 114   Temp 97.2 °F (36.2 °C) (Temporal)   Resp 16   Ht 170.2 cm (67\")   Wt 86.2 kg (190 lb)   SpO2 97%   BMI 29.76 kg/m²  Body surface area is 1.98 meters squared.    Pain Score    07/07/22 1305   PainSc: 0-No pain       Physical Exam:  Physical Exam  Constitutional:       Appearance: Normal appearance.   HENT:      Head: Normocephalic and atraumatic.   Eyes:      Extraocular Movements: Extraocular movements intact.   Cardiovascular:      Rate and Rhythm: Normal rate and regular rhythm.   Pulmonary:      Effort: Pulmonary effort is normal.      Breath sounds: Normal breath sounds.   Abdominal:      General: Abdomen is flat.      Palpations: Abdomen is soft.   Musculoskeletal:      Comments: Abnormal gait r/t CMT.  Uses cane for ambulation   Skin:     General: Skin is warm and dry.   Neurological:      General: No focal deficit present.      Mental Status: He is alert and oriented to person, place, and time.         Iam Gibbs reports a pain score of 0. No intervention indicated.        ASSESSMENT / PLAN    1. 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 Hypertension, unspecified type            ASSESSMENT:   1.  Hemochromatosis   -Pt has compound Heterozygous Hemochromotosis one copy of C282Y and one copy of S65C.  -He also has Charcot-Claire Tooth Disease.  -Liver Function Tests have been normal.  -His Ferritin down to 217.  -Will continue phlebotomy    2. Hypertension  -BP stable today  128/78  -Pt is taking HCTZ, and Losartan  -Managed by PCP    3.  " Charcot-Claire-Tooth Disease  -Abnormal gait  -Managed by neurology     PLAN:  1.  Discussed with patient the treatment for hemochromotosis is therapeutic phlebotomy. Explained that procedure, the risks and benefits.   4.  We will perform therapeutic phlebotomy today.  500 cc whole blood was removed.  Pt tolerated phlebotomy well w/o s/s of hypovolemia or hypotension.    5.  We will continue to perform phlebotomy q 2 weeks until his ferritin is <50.  He will have lab drawn before each treatment.    6.  He will RTC for visist with provider in 3 months.   7.  He will continue all medications and treatment plans per PCP and other providers.    8.   Care discussed with patient.  Understanding expressed.  Patient agreeable with plan.    PHMEBOTOMY TODAY        I spent 25 minutes caring for Iam on this date of service. This time includes time spent by me in the following activities: preparing for the visit, reviewing tests, obtaining and/or reviewing a separately obtained history, counseling and educating the patient/family/caregiver, ordering medications, tests, or procedures and documenting information in the medical record.       Nat Paez, APRN  07/07/2022

## 2022-07-21 ENCOUNTER — CLINICAL SUPPORT (OUTPATIENT)
Dept: ONCOLOGY | Facility: CLINIC | Age: 57
End: 2022-07-21

## 2022-07-21 ENCOUNTER — LAB (OUTPATIENT)
Dept: LAB | Facility: HOSPITAL | Age: 57
End: 2022-07-21

## 2022-07-21 VITALS
DIASTOLIC BLOOD PRESSURE: 80 MMHG | HEART RATE: 106 BPM | RESPIRATION RATE: 14 BRPM | OXYGEN SATURATION: 97 % | SYSTOLIC BLOOD PRESSURE: 130 MMHG

## 2022-07-21 DIAGNOSIS — E83.110 HEMOCHROMATOSIS ASSOCIATED WITH COMPOUND HETEROZYGOUS MUTATION IN HFE GENE: Primary | ICD-10-CM

## 2022-07-21 DIAGNOSIS — E83.118 OTHER HEMOCHROMATOSIS: Primary | ICD-10-CM

## 2022-07-21 DIAGNOSIS — E83.118 OTHER HEMOCHROMATOSIS: ICD-10-CM

## 2022-07-21 LAB
DEPRECATED RDW RBC AUTO: 43.5 FL (ref 37–54)
ERYTHROCYTE [DISTWIDTH] IN BLOOD BY AUTOMATED COUNT: 13.2 % (ref 12.3–15.4)
FERRITIN SERPL-MCNC: 132.2 NG/ML (ref 30–400)
HCT VFR BLD AUTO: 45.3 % (ref 37.5–51)
HGB BLD-MCNC: 15.4 G/DL (ref 13–17.7)
MCH RBC QN AUTO: 31.1 PG (ref 26.6–33)
MCHC RBC AUTO-ENTMCNC: 34 G/DL (ref 31.5–35.7)
MCV RBC AUTO: 91.5 FL (ref 79–97)
PLATELET # BLD AUTO: 263 10*3/MM3 (ref 140–450)
PMV BLD AUTO: 10 FL (ref 6–12)
RBC # BLD AUTO: 4.95 10*6/MM3 (ref 4.14–5.8)
WBC NRBC COR # BLD: 6.28 10*3/MM3 (ref 3.4–10.8)
WHOLE BLOOD HOLD SPECIMEN: NORMAL

## 2022-07-21 PROCEDURE — 82728 ASSAY OF FERRITIN: CPT

## 2022-07-21 PROCEDURE — 99195 PHLEBOTOMY: CPT | Performed by: NURSE PRACTITIONER

## 2022-07-21 PROCEDURE — 85027 COMPLETE CBC AUTOMATED: CPT

## 2022-07-21 PROCEDURE — 36415 COLL VENOUS BLD VENIPUNCTURE: CPT

## 2022-07-21 NOTE — PROGRESS NOTES
I have reviewed the notes, assessments, and/or procedures performed by Angelita Vargas, I concur with her/his documentation of Iam Gibbs.

## 2022-07-21 NOTE — PROGRESS NOTES
Patient here for lab evaluation for possible 500 ml phlebotomy for hemachromatosis for goal ferritin <50.  Patient denies any problems today.  Skin warm,dry, and color within normal limits.  Labs reviewed hemoglobin 15.4 and ferritin 132.20.  500 ml phlebotomy performed per right anticubital.  Patient tolerated without any difficulty.  Denies being lightheaded or dizzy upon standing.  Will return in 2 weeks for repeat labs and possible phlebotomy.

## 2022-08-04 ENCOUNTER — APPOINTMENT (OUTPATIENT)
Dept: LAB | Facility: HOSPITAL | Age: 57
End: 2022-08-04

## 2022-08-11 ENCOUNTER — LAB (OUTPATIENT)
Dept: LAB | Facility: HOSPITAL | Age: 57
End: 2022-08-11

## 2022-08-11 ENCOUNTER — CLINICAL SUPPORT (OUTPATIENT)
Dept: ONCOLOGY | Facility: CLINIC | Age: 57
End: 2022-08-11

## 2022-08-11 VITALS
OXYGEN SATURATION: 91 % | SYSTOLIC BLOOD PRESSURE: 132 MMHG | RESPIRATION RATE: 14 BRPM | DIASTOLIC BLOOD PRESSURE: 80 MMHG | HEART RATE: 83 BPM

## 2022-08-11 DIAGNOSIS — E83.110 HEMOCHROMATOSIS ASSOCIATED WITH COMPOUND HETEROZYGOUS MUTATION IN HFE GENE: ICD-10-CM

## 2022-08-11 DIAGNOSIS — E83.118 OTHER HEMOCHROMATOSIS: ICD-10-CM

## 2022-08-11 DIAGNOSIS — Z14.8 CARRIER OF HEMOCHROMATOSIS HFE GENE MUTATION: ICD-10-CM

## 2022-08-11 LAB
DEPRECATED RDW RBC AUTO: 41.1 FL (ref 37–54)
ERYTHROCYTE [DISTWIDTH] IN BLOOD BY AUTOMATED COUNT: 12.4 % (ref 12.3–15.4)
FERRITIN SERPL-MCNC: 86.27 NG/ML (ref 30–400)
HCT VFR BLD AUTO: 45.9 % (ref 37.5–51)
HGB BLD-MCNC: 15.1 G/DL (ref 13–17.7)
MCH RBC QN AUTO: 29.9 PG (ref 26.6–33)
MCHC RBC AUTO-ENTMCNC: 32.9 G/DL (ref 31.5–35.7)
MCV RBC AUTO: 90.9 FL (ref 79–97)
PLATELET # BLD AUTO: 259 10*3/MM3 (ref 140–450)
PMV BLD AUTO: 9.9 FL (ref 6–12)
RBC # BLD AUTO: 5.05 10*6/MM3 (ref 4.14–5.8)
WBC NRBC COR # BLD: 5.93 10*3/MM3 (ref 3.4–10.8)

## 2022-08-11 PROCEDURE — 85027 COMPLETE CBC AUTOMATED: CPT

## 2022-08-11 PROCEDURE — 99195 PHLEBOTOMY: CPT | Performed by: NURSE PRACTITIONER

## 2022-08-11 PROCEDURE — 82728 ASSAY OF FERRITIN: CPT

## 2022-08-11 PROCEDURE — 36415 COLL VENOUS BLD VENIPUNCTURE: CPT

## 2022-08-11 NOTE — PROGRESS NOTES
Patient here for lab evaluation for possible 500 ml phlebotomy for hemachromatosis for goal ferritin <50.  Patient denies any problems today.  Skin warm, dry, and color within normal limits.  Labs reviewed ferritin 86.27 and hemoglobin 15.1.   500 ml phlebotomy performed per right anticubital.  Patient tolerated without any difficulty.  Denies being light headed or dizzy upon standing.  Will do next phlebotomy at his follow up appointment.

## 2022-08-11 NOTE — PROGRESS NOTES
I have reviewed the notes, assessments, and/or procedures performed by Angelita Vargas RN I concur with her/his documentation of Iam Gibbs.

## 2022-08-29 DIAGNOSIS — J40 BRONCHITIS: ICD-10-CM

## 2022-08-29 DIAGNOSIS — E78.1 HYPERTRIGLYCERIDEMIA: ICD-10-CM

## 2022-08-29 DIAGNOSIS — F41.9 ANXIETY: ICD-10-CM

## 2022-08-29 DIAGNOSIS — R68.89 FLU-LIKE SYMPTOMS: ICD-10-CM

## 2022-08-29 RX ORDER — BUSPIRONE HYDROCHLORIDE 15 MG/1
TABLET ORAL
Qty: 30 TABLET | Refills: 3 | Status: SHIPPED | OUTPATIENT
Start: 2022-08-29

## 2022-08-29 RX ORDER — ALBUTEROL SULFATE 90 UG/1
AEROSOL, METERED RESPIRATORY (INHALATION)
Qty: 18 G | Refills: 5 | Status: SHIPPED | OUTPATIENT
Start: 2022-08-29

## 2022-08-29 NOTE — TELEPHONE ENCOUNTER
Rx Refill Note  Requested Prescriptions     Pending Prescriptions Disp Refills   • Ventolin  (90 Base) MCG/ACT inhaler [Pharmacy Med Name: VENTOLIN HFA  AEROSOL SOLN] 18 g 5     Sig: INHALE 2 PUFFS EVERY 4 (FOUR) HOURS AS NEEDED FOR WHEEZING OR SHORTNESS OF AIR.   • busPIRone (BUSPAR) 15 MG tablet [Pharmacy Med Name: BUSPIRONE HCL 15MG TABLET] 30 tablet 3     Sig: TAKE 1/2 (one-half) TABLET BY MOUTH 2 (TWO) TIMES A DAY.      Last office visit with prescribing clinician: 7/7/2022      Next office visit with prescribing clinician: 1/9/2023            Mitzi Floyd, JEVON  08/29/22, 16:41 CDT

## 2022-08-31 ENCOUNTER — OFFICE VISIT (OUTPATIENT)
Dept: PULMONOLOGY | Facility: CLINIC | Age: 57
End: 2022-08-31

## 2022-08-31 VITALS
SYSTOLIC BLOOD PRESSURE: 128 MMHG | WEIGHT: 185 LBS | OXYGEN SATURATION: 97 % | BODY MASS INDEX: 29.03 KG/M2 | DIASTOLIC BLOOD PRESSURE: 80 MMHG | HEIGHT: 67 IN | HEART RATE: 81 BPM

## 2022-08-31 DIAGNOSIS — J30.89 NON-SEASONAL ALLERGIC RHINITIS, UNSPECIFIED TRIGGER: ICD-10-CM

## 2022-08-31 DIAGNOSIS — Z86.16 HISTORY OF COVID-19: ICD-10-CM

## 2022-08-31 DIAGNOSIS — G60.0 CHARCOT-MARIE-TOOTH DISEASE: ICD-10-CM

## 2022-08-31 DIAGNOSIS — G47.33 OBSTRUCTIVE SLEEP APNEA: ICD-10-CM

## 2022-08-31 DIAGNOSIS — J32.9 RECURRENT SINUSITIS: ICD-10-CM

## 2022-08-31 DIAGNOSIS — J44.9 CHRONIC OBSTRUCTIVE PULMONARY DISEASE, UNSPECIFIED COPD TYPE: Primary | ICD-10-CM

## 2022-08-31 DIAGNOSIS — Z87.891 FORMER SMOKER: ICD-10-CM

## 2022-08-31 PROCEDURE — 99204 OFFICE O/P NEW MOD 45 MIN: CPT | Performed by: INTERNAL MEDICINE

## 2022-08-31 RX ORDER — FLUTICASONE PROPIONATE 50 MCG
2 SPRAY, SUSPENSION (ML) NASAL DAILY
Qty: 16 G | Refills: 5 | Status: SHIPPED | OUTPATIENT
Start: 2022-08-31 | End: 2022-12-07 | Stop reason: SINTOL

## 2022-08-31 RX ORDER — LORATADINE 10 MG/1
10 TABLET ORAL DAILY
Qty: 90 TABLET | Refills: 3 | Status: SHIPPED | OUTPATIENT
Start: 2022-08-31

## 2022-08-31 RX ORDER — AZELASTINE HYDROCHLORIDE 137 UG/1
2 SPRAY, METERED NASAL 2 TIMES DAILY
Qty: 60 ML | Refills: 5 | Status: SHIPPED | OUTPATIENT
Start: 2022-08-31

## 2022-08-31 NOTE — PROGRESS NOTES
RESPIRATORY DISEASE CLINIC NEW CONSULT NOTE     Patient: Iam Gibbs      :  1965   Age: 57 y.o.    Date of Service: 2022      Subjective:   Requesting Physician: Nick Jackson MD     Reason for Consultation:    Diagnosis Plan   1. Chronic obstructive pulmonary disease, unspecified COPD type (HCC)     2. History of COVID-19     3. Former smoker     4. Obstructive sleep apnea (cpap)     5. Non-seasonal allergic rhinitis, unspecified trigger     6. Charcot-Claire-Tooth disease          Chief Complaint:     Chief Complaint   Patient presents with   • COPD       History of present illness: Iam Gibbs is a 57 y.o. male who presents to the office today to be seen for    Diagnosis Plan   1. Chronic obstructive pulmonary disease, unspecified COPD type (HCC)     2. History of COVID-19     3. Former smoker     4. Obstructive sleep apnea (cpap)     5. Non-seasonal allergic rhinitis, unspecified trigger     6. Charcot-Claire-Tooth disease        Other problems per record.  Patient is a very pleasant middle aged  gentleman was seen in the pulmonary clinic as a new consult today.  He was referred to pulmonary clinic by Dr. Jackson.    Patient has Charcot Claire Tooth Disease and also has hemochromatosis.  He is a former smoker and quit smoking in 2016.  He has a diagnosis of COPD and is currently getting Breztri and albuterol rescue inhaler prescribed by Dr. Jackson.  The patient did have albuterol nebulizer but he did not use it.  He gets short of breath on exertion.  He has anxiety issues and also has mobility problems due to underlying disease.  He has allergy problems and also using Zyrtec from time to time but still has limited food and postnasal drainage.  He has sleep apnea medical history done many years ago but unable to tolerate the CPAP.  He has anxiety issues and is on BuSpar but does not use Xanax anymore.    He is vaccinated for COVID and had COVID illness twice  earlier this year and last year.  He did not need any hospitalization.  He is not on any home oxygen.  He has history of hypertension.  He had a low-dose CT scan of the chest done in January which did not show any lung nodule but he had no recent PFT done and he had a PFT done long time ago most likely normal office which is not available at this time.  He also had a sleep study done long time ago but no recent sleep study done.  He has chronic sinus problems and also has some vocal cord dysfunction seen by  physician on timing of but did not have recent sinus imaging studies or work-up done.    Past History  Past Medical History:   Diagnosis Date   • Anemia    • Arthritis    • Back pain    • Charcot-Claire-Tooth disease     DX AT 19    • Claustrophobia    • COPD (chronic obstructive pulmonary disease) (HCC)    • Elevated cholesterol    • Gait abnormality     DUE TO CMT    • GERD (gastroesophageal reflux disease)    • Hyperlipidemia    • Hypertension    • Irritable bowel    • Other diseases of vocal cords     NARROW VOICE BOX    • Sleep apnea     DOES NOT USE BIPAP OR CPAP      Past Surgical History:   Procedure Laterality Date   • BICEPS TENDONESIS SUBPECTORALIS REPAIR Right 6/28/2019    Procedure: BICEPS TENODESIS - RIGHT;  Surgeon: Yeison Agudelo MD;  Location: [facility] OR;  Service: Orthopedics   • COLONOSCOPY N/A 2/17/2020    Procedure: COLONOSCOPY WITH ANESTHESIA;  Surgeon: Cal Vela DO;  Location: [facility] ENDOSCOPY;  Service: Gastroenterology;  Laterality: N/A;  preop; blood in stool  postop; polyps   PCP anand Jackson    • ENDOSCOPY N/A 2/25/2020    Procedure: ESOPHAGOGASTRODUODENOSCOPY WITH ANESTHESIA;  Surgeon: Cal Vela DO;  Location: [facility] ENDOSCOPY;  Service: Gastroenterology;  Laterality: N/A;  preop; blood in stool  postop; normal   PCP Anand Jackson    • SHOULDER ARTHROSCOPY W/ ROTATOR CUFF REPAIR Right 6/28/2019    Procedure: RIGHT SHOULDER ARTHROSCOPIC ROTATOR CUFF REPAIR,  SUBACROMIAL DECOMPRESSION;  Surgeon: Yeison Agudelo MD;  Location: [facility] OR;  Service: Orthopedics     Allergies   Allergen Reactions   • Adhesive Tape Rash     VERY SENSITIVE TO ADHESIVE    • Latex Rash and Unknown - High Severity     Current Outpatient Medications   Medication Sig Dispense Refill   • acetaminophen (TYLENOL) 500 MG tablet Take 500 mg by mouth Every 6 (Six) Hours As Needed for Mild Pain .     • allopurinol (ZYLOPRIM) 100 MG tablet Take 1 tablet by mouth Daily. 90 tablet 1   • Budeson-Glycopyrrol-Formoterol (Breztri Aerosphere) 160-9-4.8 MCG/ACT aerosol inhaler Inhale 2 puffs 2 (Two) Times a Day. 10.7 each 5   • buPROPion XL (WELLBUTRIN XL) 150 MG 24 hr tablet TAKE ONE (1) TABLET BY MOUTH DAILY. 30 tablet 5   • busPIRone (BUSPAR) 15 MG tablet TAKE 1/2 (one-half) TABLET BY MOUTH 2 (TWO) TIMES A DAY. 30 tablet 3   • gemfibrozil (LOPID) 600 MG tablet TAKE ONE (1) TABLET BY MOUTH DAILY. NEEDS OFFICE APPT. 90 tablet 4   • hydroCHLOROthiazide (HYDRODIURIL) 25 MG tablet TAKE ONE (1) TABLET BY MOUTH DAILY 90 tablet 1   • ibuprofen (ADVIL,MOTRIN) 600 MG tablet Take 600 mg by mouth Every 6 (Six) Hours As Needed for Mild Pain .     • losartan (COZAAR) 25 MG tablet TAKE ONE (1) TABLET BY MOUTH DAILY. 90 tablet 4   • pantoprazole (PROTONIX) 40 MG EC tablet TAKE ONE (1) TABLET BY MOUTH DAILY 90 tablet 3   • tadalafil (Cialis) 20 MG tablet Take 1 tablet by mouth Daily As Needed for Erectile Dysfunction. 30 tablet 1   • Ventolin  (90 Base) MCG/ACT inhaler INHALE 2 PUFFS EVERY 4 (FOUR) HOURS AS NEEDED FOR WHEEZING OR SHORTNESS OF AIR. 18 g 5   • albuterol (PROVENTIL) (2.5 MG/3ML) 0.083% nebulizer solution Take 2.5 mg by nebulization 3 (Three) Times a Day. 90 each 1   • ALPRAZolam (Xanax) 0.5 MG tablet Take 1 tablet by mouth 2 (Two) Times a Day As Needed for Anxiety. (Patient taking differently: Take 0.5 mg by mouth As Needed for Anxiety.) 30 tablet 0   • cetirizine (ZyrTEC Allergy) 10 MG tablet Take 1 tablet by  "mouth Daily. 30 tablet 1     No current facility-administered medications for this visit.     Social History     Socioeconomic History   • Marital status: Single   • Years of education: 10   • Highest education level: GED or equivalent   Tobacco Use   • Smoking status: Former Smoker     Packs/day: 1.00     Years: 22.00     Pack years: 22.00     Types: Cigars, Cigarettes     Quit date: 10/2015     Years since quittin.9   • Smokeless tobacco: Former User     Quit date:    Vaping Use   • Vaping Use: Never used   Substance and Sexual Activity   • Alcohol use: Yes     Alcohol/week: 3.0 standard drinks     Types: 3 Cans of beer per week     Comment: weekly   • Drug use: Yes     Types: Marijuana   • Sexual activity: Yes     Partners: Female     Family History   Problem Relation Age of Onset   • Cancer Mother    • Diabetes Mother    • Colon polyps Mother    • Hyperlipidemia Father    • Hypertension Father    • Cancer Father    • Colon cancer Neg Hx      Immunization History   Administered Date(s) Administered   • COVID-19 (MODERNA) 1st, 2nd, 3rd Dose Only 2021, 2021       Review of Systems  A complete review of systems is performed and all other systems were reviewed and negative except as noted above in the HPI.  Review of Systems   Constitutional: Positive for fatigue.   HENT: Positive for congestion, postnasal drip and sinus pressure.    Eyes: Negative.    Respiratory: Positive for chest tightness and shortness of breath.    Cardiovascular: Negative.    Gastrointestinal: Negative.    Endocrine: Negative.    Genitourinary: Negative.    Musculoskeletal: Positive for arthralgias and back pain.   Skin: Negative.    Allergic/Immunologic: Positive for environmental allergies.   Neurological: Positive for weakness and light-headedness.   Psychiatric/Behavioral: Negative.          Objective:     Vital Signs:  /80   Pulse 81   Ht 170.2 cm (67\")   Wt 83.9 kg (185 lb)   SpO2 97% Comment: RA  BMI 28.98 " kg/m²     Pulmonary Functions Testing Results:    No results found for this or any previous visit.        Physical Exam  General:  Patient is a 57 y.o. middle aged  male. Looks states age. Appears to be in no acute distress.  Eyes:  EOMI.  PERRLA.  Vision intact.  No scleral icterus.    Ear, Nose, Mouth and Throat:  External inspection of the ears and nose appear to be normal.  No obvious scars or lesions.  Hearing is grossly intact.  No leukoplakia, pharyngitis, stomatitis or thrush. Swollen nasal mucosa with post nasal drip.   Neck:  Range of motion of neck normal.  No thyromegaly or masses. Malanpati Class 3, no jugular venous distention, no thyroid swelling  Respiratory:  Clear to auscultation bilaterally.  No use of accessory muscles. Decreased breath sounds.      Cardiovascular:  Regularly regular rhythm without S3, S4 or murmur.  No hepatojugular reflux nor carotid bruits.  Pulses intact in four extremities.  No obvious signs of edema.    Gastrointestinal:  Nontender, nondistended, soft.  Bowel sounds positive in all four quadrants.  No organomegaly or masses nor bruit.    Lymphatic:  No significant adenopathy appreciated in the neck, axilla or elsewhere.    Musculoskeletal:  Gait was grossly intact.  Range of motion, strength and sensitivity of all four extremities appear to be intact.  Distal tendon reflexes intact.   Skin:  No obvious rashes, lesions, ulcers or large amount of bruising.    Neurological:  Refer to Eye, Ear, Nose and Throat and musculoskeletal exams for additional details.  Distal tendon reflexes intact.  No clonus or Babinski.  Sensation to touch is grossly intact.  Contracture of hands noted.   Psychiatric:  Patient is alert and oriented to person, place and time.  Recent and remote memory appear to be intact.  Patient does not show outward signs of depression.      Diagnostic Findings:   Pertinent findings noted and abnormal findings also noted. Reviewed .      Chest  Imaging    Study Result  Narrative & Impression   CT CHEST LOW DOSE CANCER SCREENING WO- 1/19/2022 1:26 PM CST     HISTORY: Lung cancer screening     COMPARISON: None     DOSE LENGTH PRODUCT: 44.4 mGy cm. Automated exposure control was also  utilized to decrease patient radiation dose.     TECHNIQUE: Noncontrast CT of the chest was performed using low-dose  protocol.     FINDINGS:      Nodules: Left lower lobe atelectasis/scarring.     Other lung findings: Emphysema.     Airway: The airway is patent.     Pleura: No mass or fluid collection.     Aorta and great vessels: Atherosclerosis is seen. There is no aneurysm  identified.     Heart and pericardium: The heart is normal in size. Coronary artery  calcifications are seen. No pericardial effusion.     Lymph nodes: No pathologic lymphadenopathy is noted.     Bones and soft tissues: No acute osseous or soft tissue abnormality is  seen.     Upper abdomen: No acute process is seen in the upper abdomen.     IMPRESSION:  1. No suspicious pulmonary nodule.  2. Lung-RADS 1: Negative      No nodules and definitely benign nodules.     Continue annual screening with low-dose CT in 12 months.        ACR Lung-RADS version 1.1 Categories and Recommendations:     1: Negative -- Recommend continued annual screening with low-dose CT in  12 months.     2: Benign appearance or behavior. -- Recommend continued annual  screening with low-dose CT in 12 months.     3: Probably benign -- Recommend low-dose CT in 6 months.     4A: Probably suspicious (5-15% of malignancy)--recommend low-dose CT in  3 months. PET/CT may be used when there is a a solid component of at  least 8 mm.     4B: Suspicious (greater than 15% chance of malignancy)--options for  additional evaluation include chest CT, PET/CT and/or tissue sampling  depending on clinical morbidities. For new large nodules developing on  an annual repeat screening CT, a one-month low-dose CT may be used to  address potentially infectious  or inflammatory conditions.     4X: Suspicious --category 3 or 4 nodules with additional features are  imaging that increases the suspicion of malignancy. Recommendation same  as 4B.     S: Other clinically significant or potentially clinically significant  findings--recommendation depends on finding.  This report was finalized on 01/19/2022 13:44 by Dr. Ania Kendrick MD.       Assessment      1. Chronic obstructive pulmonary disease, unspecified COPD type (HCC)    2. History of COVID-19    3. Former smoker    4. Obstructive sleep apnea (cpap)    5. Non-seasonal allergic rhinitis, unspecified trigger    6. Charcot-Claire-Tooth disease        Plan/Recommendations     1.  His shortness of breath and cough and postnasal drip related to underlying allergic rhinitis he also has COPD from history of cocaine abuse.  No recent PFTs done we will do pulmonary function test and a sinus CT scan for further evaluation.  2.  For his COPD can continue Breztri and albuterol rescue inhaler.  He is not requiring any nebulizer.  He did not need any medication refills and already had recommendations.  3.  He has a lot of allergies but not using Zyrtec regularly and I started him on fluticasone nasal spray, Astelin nasal spray and loratadine prescriptions were sent to pharmacy.  4.  He has history of tobacco use but he quit smoking 7 years ago.  He is not on any oxygen.  He will need another sleep study and probably will need to start CPAP again.  He had CPAP at home before but he did not use it and it was returned to the DME company.  5.  He will continue follow-up with Dr. Jackson and get his booster dose for COVID-vaccine and will also need pneumonia and influenza vaccine.  He will continue other medications as before.  6.  I advised him to return to the pulmonary clinic in 3 months time after the CT scan of the sinuses, PFT and sleep study.  He can return to the pulmonary clinic earlier if needed.    Follow Up    3 months    Time  Spent   45 minutes      I appreciate the opportunity of participating in this patients care. I would like to thank the PCP for the referral. Please feel free to contact me with any other questions.       Shanice Hebert MD   Pulmonologist/Intensivist     13:21 CDT

## 2022-09-09 DIAGNOSIS — E78.1 HYPERTRIGLYCERIDEMIA: ICD-10-CM

## 2022-09-09 DIAGNOSIS — F41.9 ANXIETY: ICD-10-CM

## 2022-09-12 ENCOUNTER — TELEPHONE (OUTPATIENT)
Dept: PULMONOLOGY | Facility: CLINIC | Age: 57
End: 2022-09-12

## 2022-09-12 DIAGNOSIS — G47.33 OBSTRUCTIVE SLEEP APNEA: Primary | ICD-10-CM

## 2022-09-12 RX ORDER — ALLOPURINOL 100 MG/1
TABLET ORAL
Qty: 90 TABLET | Refills: 1 | Status: SHIPPED | OUTPATIENT
Start: 2022-09-12 | End: 2023-03-22

## 2022-09-12 NOTE — TELEPHONE ENCOUNTER
Rx Refill Note  Requested Prescriptions     Pending Prescriptions Disp Refills   • allopurinol (ZYLOPRIM) 100 MG tablet [Pharmacy Med Name: ALLOPURINOL 100MG TABLET] 90 tablet 1     Sig: TAKE ONE (1) TABLET BY MOUTH DAILY.      Last office visit with prescribing clinician: 7/7/2022      Next office visit with prescribing clinician: 1/9/2023            Mitzi Floyd LPN  09/12/22, 16:09 CDT

## 2022-09-13 ENCOUNTER — TELEPHONE (OUTPATIENT)
Dept: PULMONOLOGY | Facility: CLINIC | Age: 57
End: 2022-09-13

## 2022-09-13 NOTE — TELEPHONE ENCOUNTER
[place] Sleepiness Scale    Situation Chance of Dozing or Sleeping   • Sitting and reading 0 - would never dose or sleep   • Watching TV 0 - would never dose or sleep   • Sitting inactive in a public place 1 - slight chance of dosing or sleeping   • Being a passenger in a motor vehicle for an hour or more 1 - slight chance of dosing or sleeping   • Lying down in the afternoon 3 - high chance of dosing or sleeping   • Sitting and talking to someone 0 - would never dose or sleep   • Sitting quietly after lunch (no alcohol) 1 - slight chance of dosing or sleeping   • Stopped for a few minutes in traffic while driving 0 - would never dose or sleep   Total score (add the scores up) 6       PATIENT REQUESTS HOME SLEEP STUDY.  HE DOES NOT WEAR O2.

## 2022-09-21 ENCOUNTER — HOSPITAL ENCOUNTER (OUTPATIENT)
Dept: SLEEP MEDICINE | Facility: HOSPITAL | Age: 57
Discharge: HOME | End: 2022-09-21

## 2022-09-21 ENCOUNTER — HOSPITAL ENCOUNTER (OUTPATIENT)
Dept: CT IMAGING | Facility: HOSPITAL | Age: 57
Discharge: HOME | End: 2022-09-21

## 2022-09-21 DIAGNOSIS — G47.33 OBSTRUCTIVE SLEEP APNEA: ICD-10-CM

## 2022-09-21 DIAGNOSIS — J32.9 RECURRENT SINUSITIS: ICD-10-CM

## 2022-09-21 PROCEDURE — 95806 SLEEP STUDY UNATT&RESP EFFT: CPT

## 2022-09-21 PROCEDURE — 95806 SLEEP STUDY UNATT&RESP EFFT: CPT | Performed by: INTERNAL MEDICINE

## 2022-09-21 PROCEDURE — 70486 CT MAXILLOFACIAL W/O DYE: CPT

## 2022-09-30 DIAGNOSIS — F32.9 REACTIVE DEPRESSION: ICD-10-CM

## 2022-09-30 DIAGNOSIS — E78.1 HYPERTRIGLYCERIDEMIA: ICD-10-CM

## 2022-09-30 DIAGNOSIS — F41.9 ANXIETY: ICD-10-CM

## 2022-09-30 RX ORDER — BUPROPION HYDROCHLORIDE 150 MG/1
150 TABLET ORAL DAILY
Qty: 90 TABLET | Refills: 1 | Status: SHIPPED | OUTPATIENT
Start: 2022-09-30 | End: 2023-04-06

## 2022-09-30 RX ORDER — HYDROCHLOROTHIAZIDE 25 MG/1
TABLET ORAL
Qty: 90 TABLET | Refills: 1 | Status: SHIPPED | OUTPATIENT
Start: 2022-09-30 | End: 2023-03-22

## 2022-09-30 NOTE — TELEPHONE ENCOUNTER
Rx Refill Note  Requested Prescriptions     Pending Prescriptions Disp Refills   • hydroCHLOROthiazide (HYDRODIURIL) 25 MG tablet [Pharmacy Med Name: HYDROCHLOROTHIAZIDE 25MG TABLET] 90 tablet 1     Sig: TAKE ONE (1) TABLET BY MOUTH DAILY   • buPROPion XL (WELLBUTRIN XL) 150 MG 24 hr tablet 90 tablet 1     Sig: Take 1 tablet by mouth Daily.      Last office visit with prescribing clinician: 7/7/2022      Next office visit with prescribing clinician: 1/9/2023            Mitzi Floyd LPN  09/30/22, 11:32 CDT

## 2022-10-14 DIAGNOSIS — G47.33 OSA (OBSTRUCTIVE SLEEP APNEA): Primary | ICD-10-CM

## 2022-11-01 ENCOUNTER — TELEPHONE (OUTPATIENT)
Dept: ONCOLOGY | Facility: CLINIC | Age: 57
End: 2022-11-01

## 2022-11-01 NOTE — TELEPHONE ENCOUNTER
Caller: Iam Gibbs    Relationship to patient: Self    Best call back number: [phone]    Chief complaint: PATIENT TO SCHEDULE FU AN LAB    Requested date: NEXT AVAILABLE

## 2022-11-07 ENCOUNTER — TELEPHONE (OUTPATIENT)
Dept: CARDIOLOGY | Facility: CLINIC | Age: 57
End: 2022-11-07

## 2022-11-07 ENCOUNTER — APPOINTMENT (OUTPATIENT)
Dept: CARDIOLOGY | Facility: HOSPITAL | Age: 57
End: 2022-11-07

## 2022-11-07 NOTE — TELEPHONE ENCOUNTER
Caller: Iam Gibbs    Relationship: Self    Best call back number: [phone]    What is the best time to reach you: ANYTIME     Who are you requesting to speak with (clinical staff, provider,  specific staff member): ANYONE     What was the call regarding: PATIENT WOULD LIKE TO RESCHEDULE ECHO TOWARDS THE MIDDLE OF January. CANCELED APPT WITH RUEL WARNER. UNABLE TO WARM TRANSFER.     Do you require a callback: YES

## 2022-11-16 ENCOUNTER — OFFICE VISIT (OUTPATIENT)
Dept: FAMILY MEDICINE CLINIC | Facility: CLINIC | Age: 57
End: 2022-11-16

## 2022-11-16 VITALS
OXYGEN SATURATION: 94 % | WEIGHT: 185 LBS | TEMPERATURE: 98.8 F | HEIGHT: 67 IN | BODY MASS INDEX: 29.03 KG/M2 | RESPIRATION RATE: 14 BRPM | HEART RATE: 102 BPM

## 2022-11-16 DIAGNOSIS — J02.9 SORE THROAT: ICD-10-CM

## 2022-11-16 DIAGNOSIS — J40 BRONCHITIS: Primary | ICD-10-CM

## 2022-11-16 DIAGNOSIS — R05.9 COUGH, UNSPECIFIED TYPE: ICD-10-CM

## 2022-11-16 PROCEDURE — 87804 INFLUENZA ASSAY W/OPTIC: CPT | Performed by: NURSE PRACTITIONER

## 2022-11-16 PROCEDURE — 87426 SARSCOV CORONAVIRUS AG IA: CPT | Performed by: NURSE PRACTITIONER

## 2022-11-16 PROCEDURE — 87880 STREP A ASSAY W/OPTIC: CPT | Performed by: NURSE PRACTITIONER

## 2022-11-16 PROCEDURE — 99213 OFFICE O/P EST LOW 20 MIN: CPT | Performed by: NURSE PRACTITIONER

## 2022-11-16 PROCEDURE — 96372 THER/PROPH/DIAG INJ SC/IM: CPT | Performed by: NURSE PRACTITIONER

## 2022-11-16 RX ORDER — DEXAMETHASONE SODIUM PHOSPHATE 4 MG/ML
8 INJECTION, SOLUTION INTRA-ARTICULAR; INTRALESIONAL; INTRAMUSCULAR; INTRAVENOUS; SOFT TISSUE ONCE
Status: COMPLETED | OUTPATIENT
Start: 2022-11-16 | End: 2022-11-16

## 2022-11-16 RX ORDER — CEFTRIAXONE 1 G/1
1 INJECTION, POWDER, FOR SOLUTION INTRAMUSCULAR; INTRAVENOUS ONCE
Status: COMPLETED | OUTPATIENT
Start: 2022-11-16 | End: 2022-11-16

## 2022-11-16 RX ORDER — AZITHROMYCIN 250 MG/1
TABLET, FILM COATED ORAL
Qty: 6 TABLET | Refills: 0 | Status: SHIPPED | OUTPATIENT
Start: 2022-11-16 | End: 2022-11-21

## 2022-11-16 RX ADMIN — CEFTRIAXONE 1 G: 1 INJECTION, POWDER, FOR SOLUTION INTRAMUSCULAR; INTRAVENOUS at 16:00

## 2022-11-16 RX ADMIN — DEXAMETHASONE SODIUM PHOSPHATE 8 MG: 4 INJECTION, SOLUTION INTRA-ARTICULAR; INTRALESIONAL; INTRAMUSCULAR; INTRAVENOUS; SOFT TISSUE at 16:01

## 2022-11-16 NOTE — PROGRESS NOTES
Subjective   Chief Complaint:  Evaluation of Respiratory Symptoms    History of Present Illness:  This 57 y.o. male was seen in the office today in the drive-thru.      The patient presents today with cough, respiratory congestion and hoarseness with an acute onset this week.    Allergies   Allergen Reactions   • Adhesive Tape Rash     VERY SENSITIVE TO ADHESIVE    • Latex Rash and Unknown - High Severity      Current Outpatient Medications on File Prior to Visit   Medication Sig   • acetaminophen (TYLENOL) 500 MG tablet Take 500 mg by mouth Every 6 (Six) Hours As Needed for Mild Pain .   • albuterol (PROVENTIL) (2.5 MG/3ML) 0.083% nebulizer solution Take 2.5 mg by nebulization 3 (Three) Times a Day.   • allopurinol (ZYLOPRIM) 100 MG tablet TAKE ONE (1) TABLET BY MOUTH DAILY.   • ALPRAZolam (Xanax) 0.5 MG tablet Take 1 tablet by mouth 2 (Two) Times a Day As Needed for Anxiety. (Patient taking differently: Take 0.5 mg by mouth As Needed for Anxiety.)   • Azelastine HCl 137 MCG/SPRAY solution 2 sprays into the nostril(s) as directed by provider 2 (Two) Times a Day.   • Budeson-Glycopyrrol-Formoterol (Breztri Aerosphere) 160-9-4.8 MCG/ACT aerosol inhaler Inhale 2 puffs 2 (Two) Times a Day.   • buPROPion XL (WELLBUTRIN XL) 150 MG 24 hr tablet Take 1 tablet by mouth Daily.   • busPIRone (BUSPAR) 15 MG tablet TAKE 1/2 (one-half) TABLET BY MOUTH 2 (TWO) TIMES A DAY.   • fluticasone (Flonase Allergy Relief) 50 MCG/ACT nasal spray 2 sprays into the nostril(s) as directed by provider Daily.   • gemfibrozil (LOPID) 600 MG tablet TAKE ONE (1) TABLET BY MOUTH DAILY. NEEDS OFFICE APPT.   • hydroCHLOROthiazide (HYDRODIURIL) 25 MG tablet TAKE ONE (1) TABLET BY MOUTH DAILY   • ibuprofen (ADVIL,MOTRIN) 600 MG tablet Take 600 mg by mouth Every 6 (Six) Hours As Needed for Mild Pain .   • loratadine (CLARITIN) 10 MG tablet Take 1 tablet by mouth Daily.   • losartan (COZAAR) 25 MG tablet TAKE ONE (1) TABLET BY MOUTH DAILY.   •  pantoprazole (PROTONIX) 40 MG EC tablet TAKE ONE (1) TABLET BY MOUTH DAILY   • tadalafil (Cialis) 20 MG tablet Take 1 tablet by mouth Daily As Needed for Erectile Dysfunction.   • Ventolin  (90 Base) MCG/ACT inhaler INHALE 2 PUFFS EVERY 4 (FOUR) HOURS AS NEEDED FOR WHEEZING OR SHORTNESS OF AIR.     No current facility-administered medications on file prior to visit.      Past Medical, Surgical, Social, and Family History:  Past Medical History:   Diagnosis Date   • Anemia    • Arthritis    • Back pain    • Charcot-Claire-Tooth disease     DX AT 19    • Claustrophobia    • COPD (chronic obstructive pulmonary disease) (HCC)    • Elevated cholesterol    • Gait abnormality     DUE TO CMT    • GERD (gastroesophageal reflux disease)    • Hyperlipidemia    • Hypertension    • Irritable bowel    • Other diseases of vocal cords     NARROW VOICE BOX    • Sleep apnea     DOES NOT USE BIPAP OR CPAP      Past Surgical History:   Procedure Laterality Date   • BICEPS TENDONESIS SUBPECTORALIS REPAIR Right 6/28/2019    Procedure: BICEPS TENODESIS - RIGHT;  Surgeon: Yeison Agudelo MD;  Location: [facility] OR;  Service: Orthopedics   • COLONOSCOPY N/A 2/17/2020    Procedure: COLONOSCOPY WITH ANESTHESIA;  Surgeon: Cal Vela DO;  Location: [facility] ENDOSCOPY;  Service: Gastroenterology;  Laterality: N/A;  preop; blood in stool  postop; polyps   PCP anand Jackson    • ENDOSCOPY N/A 2/25/2020    Procedure: ESOPHAGOGASTRODUODENOSCOPY WITH ANESTHESIA;  Surgeon: Cal Vela DO;  Location: [facility] ENDOSCOPY;  Service: Gastroenterology;  Laterality: N/A;  preop; blood in stool  postop; normal   PCP Anand Jackson    • SHOULDER ARTHROSCOPY W/ ROTATOR CUFF REPAIR Right 6/28/2019    Procedure: RIGHT SHOULDER ARTHROSCOPIC ROTATOR CUFF REPAIR, SUBACROMIAL DECOMPRESSION;  Surgeon: Yeison Agudelo MD;  Location: [facility] OR;  Service: Orthopedics     Social History     Socioeconomic History   • Marital status: Single   • Years of education: 10  "  • Highest education level: GED or equivalent   Tobacco Use   • Smoking status: Former     Packs/day: 1.00     Years: 22.00     Pack years: 22.00     Types: Cigars, Cigarettes     Quit date: 10/2015     Years since quittin.1   • Smokeless tobacco: Former     Quit date:    Vaping Use   • Vaping Use: Never used   Substance and Sexual Activity   • Alcohol use: Yes     Alcohol/week: 3.0 standard drinks     Types: 3 Cans of beer per week     Comment: weekly   • Drug use: Yes     Types: Marijuana   • Sexual activity: Yes     Partners: Female     Family History   Problem Relation Age of Onset   • Cancer Mother    • Diabetes Mother    • Colon polyps Mother    • Hyperlipidemia Father    • Hypertension Father    • Cancer Father    • Colon cancer Neg Hx      Objective   Covid Precautions Maintained  Physical Exam  Constitutional:       General: He is not in acute distress.     Comments: Voice is hoarse.   Pulmonary:      Effort: Pulmonary effort is normal. No respiratory distress.   Neurological:      Mental Status: He is alert.     Pulse 102   Temp 98.8 °F (37.1 °C)   Resp 14   Ht 170.2 cm (67\")   Wt 83.9 kg (185 lb)   SpO2 94%   BMI 28.98 kg/m²     Assessment & Plan   Diagnoses and all orders for this visit:    1. Bronchitis (Primary)  -     dexamethasone (DECADRON) injection 8 mg  -     cefTRIAXone (ROCEPHIN) injection 1 g    Other orders  -     azithromycin (Zithromax Z-Gavino) 250 MG tablet; Take 2 tablets the first day, then 1 tablet daily for 4 days.  Dispense: 6 tablet; Refill: 0    Discussion:  We will give steroid antibiotic injection here at office. Advised to follow up starting tomorrow with an oral antibiotic. Advised patient point of care, influenza, COVID-19 and strep test are all negative.    Follow-up:  Return if symptoms worsen or fail to improve.    Electronically signed by Moisés Yang, 22, 01:10 PM CST.    "

## 2022-11-17 ENCOUNTER — APPOINTMENT (OUTPATIENT)
Dept: LAB | Facility: HOSPITAL | Age: 57
End: 2022-11-17

## 2022-11-17 LAB
EXPIRATION DATE: NORMAL
FLUAV AG NPH QL: NEGATIVE
FLUBV AG NPH QL: NEGATIVE
INTERNAL CONTROL: NORMAL
Lab: NORMAL
S PYO AG THROAT QL: NEGATIVE
SARS-COV-2 AG UPPER RESP QL IA.RAPID: NOT DETECTED

## 2022-11-21 ENCOUNTER — OFFICE VISIT (OUTPATIENT)
Dept: FAMILY MEDICINE CLINIC | Facility: CLINIC | Age: 57
End: 2022-11-21

## 2022-11-21 VITALS
RESPIRATION RATE: 14 BRPM | TEMPERATURE: 98.9 F | BODY MASS INDEX: 29.03 KG/M2 | HEIGHT: 67 IN | OXYGEN SATURATION: 94 % | WEIGHT: 185 LBS | HEART RATE: 104 BPM

## 2022-11-21 DIAGNOSIS — N52.9 ERECTILE DYSFUNCTION, UNSPECIFIED ERECTILE DYSFUNCTION TYPE: ICD-10-CM

## 2022-11-21 DIAGNOSIS — J40 BRONCHITIS: Primary | ICD-10-CM

## 2022-11-21 PROCEDURE — 99213 OFFICE O/P EST LOW 20 MIN: CPT | Performed by: NURSE PRACTITIONER

## 2022-11-21 RX ORDER — PREDNISONE 20 MG/1
20 TABLET ORAL DAILY
Qty: 5 TABLET | Refills: 0 | Status: SHIPPED | OUTPATIENT
Start: 2022-11-21 | End: 2022-11-26

## 2022-11-21 RX ORDER — TADALAFIL 20 MG/1
20 TABLET ORAL DAILY PRN
Qty: 30 TABLET | Refills: 1 | Status: SHIPPED | OUTPATIENT
Start: 2022-11-21

## 2022-11-21 RX ORDER — LEVOFLOXACIN 500 MG/1
500 TABLET, FILM COATED ORAL DAILY
Qty: 10 TABLET | Refills: 0 | Status: SHIPPED | OUTPATIENT
Start: 2022-11-21 | End: 2022-11-29

## 2022-11-21 NOTE — PROGRESS NOTES
Subjective   Chief Complaint:  Evaluation of Respiratory Symptoms    History of Present Illness:  This 57 y.o. male was seen in the office today in the drive-thru with thick sputum production, cough, and sinus pressure. He reports extreme nasal congestion with an acute onset. He reports last week he completed a Z-Gavino and only got slightly better, but he is still experiencing thick sputum.    Allergies   Allergen Reactions   • Adhesive Tape Rash     VERY SENSITIVE TO ADHESIVE    • Latex Rash and Unknown - High Severity      Current Outpatient Medications on File Prior to Visit   Medication Sig   • acetaminophen (TYLENOL) 500 MG tablet Take 500 mg by mouth Every 6 (Six) Hours As Needed for Mild Pain .   • albuterol (PROVENTIL) (2.5 MG/3ML) 0.083% nebulizer solution Take 2.5 mg by nebulization 3 (Three) Times a Day.   • allopurinol (ZYLOPRIM) 100 MG tablet TAKE ONE (1) TABLET BY MOUTH DAILY.   • ALPRAZolam (Xanax) 0.5 MG tablet Take 1 tablet by mouth 2 (Two) Times a Day As Needed for Anxiety. (Patient taking differently: Take 0.5 mg by mouth As Needed for Anxiety.)   • Azelastine HCl 137 MCG/SPRAY solution 2 sprays into the nostril(s) as directed by provider 2 (Two) Times a Day.   • azithromycin (Zithromax Z-Gavino) 250 MG tablet Take 2 tablets the first day, then 1 tablet daily for 4 days.   • Budeson-Glycopyrrol-Formoterol (Breztri Aerosphere) 160-9-4.8 MCG/ACT aerosol inhaler Inhale 2 puffs 2 (Two) Times a Day.   • buPROPion XL (WELLBUTRIN XL) 150 MG 24 hr tablet Take 1 tablet by mouth Daily.   • busPIRone (BUSPAR) 15 MG tablet TAKE 1/2 (one-half) TABLET BY MOUTH 2 (TWO) TIMES A DAY.   • fluticasone (Flonase Allergy Relief) 50 MCG/ACT nasal spray 2 sprays into the nostril(s) as directed by provider Daily.   • gemfibrozil (LOPID) 600 MG tablet TAKE ONE (1) TABLET BY MOUTH DAILY. NEEDS OFFICE APPT.   • hydroCHLOROthiazide (HYDRODIURIL) 25 MG tablet TAKE ONE (1) TABLET BY MOUTH DAILY   • ibuprofen (ADVIL,MOTRIN) 600 MG  tablet Take 600 mg by mouth Every 6 (Six) Hours As Needed for Mild Pain .   • loratadine (CLARITIN) 10 MG tablet Take 1 tablet by mouth Daily.   • losartan (COZAAR) 25 MG tablet TAKE ONE (1) TABLET BY MOUTH DAILY.   • pantoprazole (PROTONIX) 40 MG EC tablet TAKE ONE (1) TABLET BY MOUTH DAILY   • Ventolin  (90 Base) MCG/ACT inhaler INHALE 2 PUFFS EVERY 4 (FOUR) HOURS AS NEEDED FOR WHEEZING OR SHORTNESS OF AIR.   • [DISCONTINUED] tadalafil (Cialis) 20 MG tablet Take 1 tablet by mouth Daily As Needed for Erectile Dysfunction.     No current facility-administered medications on file prior to visit.      Past Medical, Surgical, Social, and Family History:  Past Medical History:   Diagnosis Date   • Anemia    • Arthritis    • Back pain    • Charcot-Claire-Tooth disease     DX AT 19    • Claustrophobia    • COPD (chronic obstructive pulmonary disease) (HCC)    • Elevated cholesterol    • Gait abnormality     DUE TO CMT    • GERD (gastroesophageal reflux disease)    • Hyperlipidemia    • Hypertension    • Irritable bowel    • Other diseases of vocal cords     NARROW VOICE BOX    • Sleep apnea     DOES NOT USE BIPAP OR CPAP      Past Surgical History:   Procedure Laterality Date   • BICEPS TENDONESIS SUBPECTORALIS REPAIR Right 6/28/2019    Procedure: BICEPS TENODESIS - RIGHT;  Surgeon: Yeison Agudelo MD;  Location: [facility] OR;  Service: Orthopedics   • COLONOSCOPY N/A 2/17/2020    Procedure: COLONOSCOPY WITH ANESTHESIA;  Surgeon: Cal Vela DO;  Location: [facility] ENDOSCOPY;  Service: Gastroenterology;  Laterality: N/A;  preop; blood in stool  postop; polyps   PCP anand Jackson    • ENDOSCOPY N/A 2/25/2020    Procedure: ESOPHAGOGASTRODUODENOSCOPY WITH ANESTHESIA;  Surgeon: Cal Vela DO;  Location: [facility] ENDOSCOPY;  Service: Gastroenterology;  Laterality: N/A;  preop; blood in stool  postop; normal   PCP Anand Jackson    • SHOULDER ARTHROSCOPY W/ ROTATOR CUFF REPAIR Right 6/28/2019    Procedure: RIGHT  "SHOULDER ARTHROSCOPIC ROTATOR CUFF REPAIR, SUBACROMIAL DECOMPRESSION;  Surgeon: Yeison Agudelo MD;  Location: [facility] OR;  Service: Orthopedics     Social History     Socioeconomic History   • Marital status: Single   • Years of education: 10   • Highest education level: GED or equivalent   Tobacco Use   • Smoking status: Former     Packs/day: 1.00     Years: 22.00     Pack years: 22.00     Types: Cigars, Cigarettes     Quit date: 10/2015     Years since quittin.1   • Smokeless tobacco: Former     Quit date:    Vaping Use   • Vaping Use: Never used   Substance and Sexual Activity   • Alcohol use: Yes     Alcohol/week: 3.0 standard drinks     Types: 3 Cans of beer per week     Comment: weekly   • Drug use: Yes     Types: Marijuana   • Sexual activity: Yes     Partners: Female     Family History   Problem Relation Age of Onset   • Cancer Mother    • Diabetes Mother    • Colon polyps Mother    • Hyperlipidemia Father    • Hypertension Father    • Cancer Father    • Colon cancer Neg Hx      Objective   Covid Precautions Maintained  Physical Exam  Vitals and nursing note reviewed.   Constitutional:       General: He is not in acute distress.     Appearance: Normal appearance. He is not toxic-appearing.      Comments: Tired-appearing.   Cardiovascular:      Rate and Rhythm: Normal rate and regular rhythm.      Pulses: Normal pulses.      Heart sounds: Normal heart sounds.   Pulmonary:      Effort: Pulmonary effort is normal.      Breath sounds: Normal breath sounds.   Skin:     General: Skin is warm and dry.      Findings: No rash.   Neurological:      Mental Status: He is alert.     Pulse 104   Temp 98.9 °F (37.2 °C)   Resp 14   Ht 170.2 cm (67\")   Wt 83.9 kg (185 lb)   SpO2 94%   BMI 28.98 kg/m²     Assessment & Plan   Diagnoses and all orders for this visit:    1. Bronchitis (Primary)    2. Erectile dysfunction, unspecified erectile dysfunction type    Other orders  -     levoFLOXacin (Levaquin) 500 MG " tablet; Take 1 tablet by mouth Daily for 10 days.  Dispense: 10 tablet; Refill: 0  -     predniSONE (DELTASONE) 20 MG tablet; Take 1 tablet by mouth Daily for 5 days.  Dispense: 5 tablet; Refill: 0  -     tadalafil (Cialis) 20 MG tablet; Take 1 tablet by mouth Daily As Needed for Erectile Dysfunction.  Dispense: 30 tablet; Refill: 1    Discussion:  Side note, he requests a refill on his tadalafil. We will proceed with a stronger antibiotic-Levaquin and short course of steroids. Advised patient it is okay to take albuterol nebulizer treatments as needed and he already has a home supply of this.    Follow-up:  Return if symptoms worsen or fail to improve.    I have personally performed the services described in this document as transcribed by the above individual, and it is both accurate and complete.

## 2022-11-29 ENCOUNTER — OFFICE VISIT (OUTPATIENT)
Dept: ONCOLOGY | Facility: CLINIC | Age: 57
End: 2022-11-29

## 2022-11-29 ENCOUNTER — LAB (OUTPATIENT)
Dept: LAB | Facility: HOSPITAL | Age: 57
End: 2022-11-29

## 2022-11-29 VITALS
TEMPERATURE: 98 F | RESPIRATION RATE: 16 BRPM | HEIGHT: 67 IN | HEART RATE: 96 BPM | BODY MASS INDEX: 29.68 KG/M2 | WEIGHT: 189.1 LBS | OXYGEN SATURATION: 94 % | DIASTOLIC BLOOD PRESSURE: 72 MMHG | SYSTOLIC BLOOD PRESSURE: 122 MMHG

## 2022-11-29 DIAGNOSIS — E83.110 HEMOCHROMATOSIS ASSOCIATED WITH COMPOUND HETEROZYGOUS MUTATION IN HFE GENE: Primary | ICD-10-CM

## 2022-11-29 DIAGNOSIS — E83.118 OTHER HEMOCHROMATOSIS: ICD-10-CM

## 2022-11-29 DIAGNOSIS — I10 HYPERTENSION, UNSPECIFIED TYPE: ICD-10-CM

## 2022-11-29 DIAGNOSIS — G60.0 CHARCOT-MARIE-TOOTH DISEASE: ICD-10-CM

## 2022-11-29 LAB
ALBUMIN SERPL-MCNC: 4.3 G/DL (ref 3.5–5.2)
ALBUMIN/GLOB SERPL: 1.6 G/DL
ALP SERPL-CCNC: 54 U/L (ref 39–117)
ALT SERPL W P-5'-P-CCNC: 24 U/L (ref 1–41)
ANION GAP SERPL CALCULATED.3IONS-SCNC: 8 MMOL/L (ref 5–15)
AST SERPL-CCNC: 16 U/L (ref 1–40)
BILIRUB SERPL-MCNC: 0.4 MG/DL (ref 0–1.2)
BUN SERPL-MCNC: 20 MG/DL (ref 6–20)
BUN/CREAT SERPL: 20.8 (ref 7–25)
CALCIUM SPEC-SCNC: 9.2 MG/DL (ref 8.6–10.5)
CHLORIDE SERPL-SCNC: 98 MMOL/L (ref 98–107)
CO2 SERPL-SCNC: 31 MMOL/L (ref 22–29)
CREAT SERPL-MCNC: 0.96 MG/DL (ref 0.76–1.27)
DEPRECATED RDW RBC AUTO: 43.8 FL (ref 37–54)
EGFRCR SERPLBLD CKD-EPI 2021: 93.3 ML/MIN/1.73
ERYTHROCYTE [DISTWIDTH] IN BLOOD BY AUTOMATED COUNT: 13.5 % (ref 12.3–15.4)
FERRITIN SERPL-MCNC: 87.11 NG/ML (ref 30–400)
GLOBULIN UR ELPH-MCNC: 2.7 GM/DL
GLUCOSE SERPL-MCNC: 96 MG/DL (ref 65–99)
HCT VFR BLD AUTO: 51.8 % (ref 37.5–51)
HGB BLD-MCNC: 16.7 G/DL (ref 13–17.7)
HOLD SPECIMEN: NORMAL
HOLD SPECIMEN: NORMAL
MCH RBC QN AUTO: 28.8 PG (ref 26.6–33)
MCHC RBC AUTO-ENTMCNC: 32.2 G/DL (ref 31.5–35.7)
MCV RBC AUTO: 89.5 FL (ref 79–97)
PLATELET # BLD AUTO: 247 10*3/MM3 (ref 140–450)
PMV BLD AUTO: 9.8 FL (ref 6–12)
POTASSIUM SERPL-SCNC: 4.1 MMOL/L (ref 3.5–5.2)
PROT SERPL-MCNC: 7 G/DL (ref 6–8.5)
RBC # BLD AUTO: 5.79 10*6/MM3 (ref 4.14–5.8)
SODIUM SERPL-SCNC: 137 MMOL/L (ref 136–145)
WBC NRBC COR # BLD: 7.67 10*3/MM3 (ref 3.4–10.8)

## 2022-11-29 PROCEDURE — 80053 COMPREHEN METABOLIC PANEL: CPT

## 2022-11-29 PROCEDURE — 85027 COMPLETE CBC AUTOMATED: CPT

## 2022-11-29 PROCEDURE — 99214 OFFICE O/P EST MOD 30 MIN: CPT | Performed by: NURSE PRACTITIONER

## 2022-11-29 PROCEDURE — 82728 ASSAY OF FERRITIN: CPT

## 2022-11-29 PROCEDURE — 99195 PHLEBOTOMY: CPT | Performed by: NURSE PRACTITIONER

## 2022-11-29 PROCEDURE — 36415 COLL VENOUS BLD VENIPUNCTURE: CPT

## 2022-11-29 NOTE — PROGRESS NOTES
[facility]  [address]  [place] [address] [phone]    Patient Name: Iam Gibbs  Encounter Date: 11/29/2022  YOB: 1965  Patient Number: [id number]    Hematology / Oncology Progress Note    HPI / REASON FOR VISIT: Iam Gibbs is a 57 y.o. male who is followed by this office for elevated iron with Hemochromatosis heterozygous with one copy of C282Y AND one copy of S65C.   He is being treated with therapeutic phlebotomy q 2 weeks until his ferritin is <50.  He also has health history significant for HTN, GERD, Anxiety, Gout, erectile dysfunction., Carcot Claire Tooth, Soinal Stenosis, and COPD.       His LFT have been normal although he does drink beer regularly.  Low dose CT of chest on 01/03/22 showed no acute processes in the upper abdomen.  May 26, 2022 CT Abdomen showed 1. Fatty infiltration of the liver. 2. Mild splenomegaly.  3. Atheromatous disease of the aortoiliac vessels. Degenerative changes of the visualized spine.  He was referred to Cardiology and saw Dr. Jean on June 9, 2022.  He will have echo and follow-up at that office later this month.    INTERVAL HISTORY  He presents to clinic today for continued follow-up.  His last phlebotomy was August 11, 2022 for ferritin 86.27.  He has tolerated procedures well without any signs and symptoms of hypovolemia or  Hypotension.    States he is getting over pneumonia still has cough and shortness of breath.   Labs reviewed today and Ferritin 87.11 .  Hgb 16.7, Hct 51.8.   Will continue phlebotomy.       LABS    Lab Results - Last 18 Months   Lab Units 11/29/22  0920 08/11/22  1228 07/21/22  1005 07/07/22  1234 07/05/22  0730 06/23/22  1056 06/09/22  1143 05/26/22  0903 04/22/22  1008   HEMOGLOBIN g/dL 16.7 15.1 15.4 14.8 15.7 15.4 16.0 15.3 16.4   HEMATOCRIT % 51.8* 45.9 45.3 43.2 46.5 46.0 47.4 44.6 48.1   MCV fL 89.5 90.9 91.5 90.9  92.4 92.9 92.0 90.8 92.3   WBC 10*3/mm3 7.67 5.93 6.28 6.42 6.78 6.44 7.84 5.43 7.56   RDW % 13.5 12.4 13.2 12.9 12.9 12.8 12.8 12.3 12.7   MPV fL 9.8 9.9 10.0 10.0  --  10.1 9.7 10.2 9.7   PLATELETS 10*3/mm3 247 259 263 256 265 249 250 217 229   IMM GRAN % %  --   --   --  0.6*  --  0.6* 0.8* 0.6* 1.5*   NEUTROS ABS 10*3/mm3  --   --   --  4.06 3.84 3.84 5.63 3.28 4.80   LYMPHS ABS 10*3/mm3  --   --   --  1.31 1.67 1.39 1.10 1.15 1.41   MONOS ABS 10*3/mm3  --   --   --  0.67 0.76 0.79 0.79 0.67 0.89   EOS ABS 10*3/mm3  --   --   --  0.28 0.39 0.29 0.19 0.25 0.27   BASOS ABS 10*3/mm3  --   --   --  0.06 0.08 0.09 0.07 0.05 0.08   IMMATURE GRANS (ABS) 10*3/mm3  --   --   --  0.04  --  0.04 0.06* 0.03 0.11*   NRBC /100 WBC  --   --   --  0.0 0.0 0.0 0.0 0.0 0.0       Lab Results - Last 18 Months   Lab Units 07/05/22  0730 05/26/22  0903 04/22/22  1008 01/03/22  0853 07/20/21  1015   GLUCOSE mg/dL 104* 90 112* 94 80   SODIUM mmol/L 142 138 140 138 138   POTASSIUM mmol/L 4.1 3.8 4.1 4.2 4.1   TOTAL CO2 mmol/L 30.2*  --   --  34.0* 30.3*   CO2 mmol/L  --  28.0 31.0*  --   --    CHLORIDE mmol/L 99 102 101 98 96*   ANION GAP mmol/L  --  8.0 8.0  --   --    CREATININE mg/dL 0.92 0.95 0.85 1.00 0.88   BUN mg/dL 14 26* 19 15 16   BUN / CREAT RATIO  15.2 27.4* 22.4 15.0 18.2   CALCIUM mg/dL 9.6 8.9 9.6 9.7 9.6   EGFR IF NONAFRICN AM mL/min/1.73  --   --   --  78 90   ALK PHOS U/L 54 50 47 55 58   TOTAL PROTEIN g/dL  --  6.3 6.6  --   --    ALT (SGPT) U/L 24 20 24 32 16   AST (SGOT) U/L 19 15 19 20 15   BILIRUBIN mg/dL 0.4 0.4 0.4 0.5 0.7   ALBUMIN g/dL 4.70 4.50 4.60 5.10 4.80   GLOBULIN gm/dL  --  1.8 2.0  --   --        Lab Results - Last 18 Months   Lab Units 07/05/22  0730 01/03/22  0853   URIC ACID mg/dL 7.0 7.2*       Lab Results - Last 18 Months   Lab Units 11/29/22  0920 08/11/22  1228 07/21/22  1005 07/07/22  1234 07/05/22  0730 06/23/22  1056 06/09/22  1143 05/26/22  0903 04/22/22  1008 01/03/22  0853 07/20/21  1015    IRON mcg/dL  --   --   --   --   --   --   --   --  120  --   --    TIBC mcg/dL  --   --   --   --   --   --   --   --  335 393  --    IRON SATURATION %  --   --   --   --   --   --   --   --  36 25  --    FERRITIN ng/mL 87.11 86.27 132.20 217.70  --  338.60 400.00   < > 496.30* 706.00*  --    TSH uIU/mL  --   --   --   --  2.020  --   --   --   --  1.240 0.979   FOLATE ng/mL  --   --   --   --  9.57  --   --   --   --   --   --     < > = values in this interval not displayed.         PAST MEDICAL HISTORY:  ALLERGIES:  Allergies   Allergen Reactions   • Adhesive Tape Rash     VERY SENSITIVE TO ADHESIVE    • Latex Rash and Unknown - High Severity     CURRENT MEDICATIONS:  Outpatient Encounter Medications as of 11/29/2022   Medication Sig Dispense Refill   • acetaminophen (TYLENOL) 500 MG tablet Take 500 mg by mouth Every 6 (Six) Hours As Needed for Mild Pain .     • albuterol (PROVENTIL) (2.5 MG/3ML) 0.083% nebulizer solution Take 2.5 mg by nebulization 3 (Three) Times a Day. 90 each 1   • allopurinol (ZYLOPRIM) 100 MG tablet TAKE ONE (1) TABLET BY MOUTH DAILY. 90 tablet 1   • ALPRAZolam (Xanax) 0.5 MG tablet Take 1 tablet by mouth 2 (Two) Times a Day As Needed for Anxiety. (Patient taking differently: Take 0.5 mg by mouth As Needed for Anxiety.) 30 tablet 0   • Azelastine HCl 137 MCG/SPRAY solution 2 sprays into the nostril(s) as directed by provider 2 (Two) Times a Day. 60 mL 5   • buPROPion XL (WELLBUTRIN XL) 150 MG 24 hr tablet Take 1 tablet by mouth Daily. 90 tablet 1   • busPIRone (BUSPAR) 15 MG tablet TAKE 1/2 (one-half) TABLET BY MOUTH 2 (TWO) TIMES A DAY. 30 tablet 3   • fluticasone (Flonase Allergy Relief) 50 MCG/ACT nasal spray 2 sprays into the nostril(s) as directed by provider Daily. 16 g 5   • gemfibrozil (LOPID) 600 MG tablet TAKE ONE (1) TABLET BY MOUTH DAILY. NEEDS OFFICE APPT. 90 tablet 4   • hydroCHLOROthiazide (HYDRODIURIL) 25 MG tablet TAKE ONE (1) TABLET BY MOUTH DAILY 90 tablet 1   •  ibuprofen (ADVIL,MOTRIN) 600 MG tablet Take 600 mg by mouth Every 6 (Six) Hours As Needed for Mild Pain .     • loratadine (CLARITIN) 10 MG tablet Take 1 tablet by mouth Daily. 90 tablet 3   • losartan (COZAAR) 25 MG tablet TAKE ONE (1) TABLET BY MOUTH DAILY. 90 tablet 4   • pantoprazole (PROTONIX) 40 MG EC tablet TAKE ONE (1) TABLET BY MOUTH DAILY 90 tablet 3   • tadalafil (Cialis) 20 MG tablet Take 1 tablet by mouth Daily As Needed for Erectile Dysfunction. 30 tablet 1   • Ventolin  (90 Base) MCG/ACT inhaler INHALE 2 PUFFS EVERY 4 (FOUR) HOURS AS NEEDED FOR WHEEZING OR SHORTNESS OF AIR. 18 g 5   • [DISCONTINUED] Budeson-Glycopyrrol-Formoterol (Breztri Aerosphere) 160-9-4.8 MCG/ACT aerosol inhaler Inhale 2 puffs 2 (Two) Times a Day. 10.7 each 5   • [DISCONTINUED] levoFLOXacin (Levaquin) 500 MG tablet Take 1 tablet by mouth Daily for 10 days. 10 tablet 0     No facility-administered encounter medications on file as of 11/29/2022.     ADULT ILLNESSES:  Patient Active Problem List   Diagnosis Code   • Stridor-infrequent R06.1   • Jelani v cord paresis (resser) ([place]) J38.02   • Charcot-Claire-Tooth disease G60.0   • Gastroesophageal reflux disease K21.9   • Former smoker Z87.891   • BMI 28.0-28.9,adult Z68.28   • Chronic neck pain M54.2, G89.29   • Spinal stenosis in cervical region M48.02   • Cervical radiculopathy M54.12   • Chronic right shoulder pain M25.511, G89.29   • Wellness examination Z00.00   • Gout M10.9   • History of chronic respiratory failure Z87.09   • Gait difficulty R26.9   • Laboratory test Z01.89   • Hypertriglyceridemia E78.1   • *Hx iron deficiency E61.1   • Hyperuricemia-zyloprim E79.0   • Heme positive stool R19.5   • Obstructive sleep apnea (cpap) G47.33   • Hypertension I10   • High serum ferritin-see hemachromatosis R79.89   • History of COVID-19 Z86.16   • Carrier of hemochromatosis HFE gene mutation (offer referral) Z14.8   • Fatty liver K76.0   • Coronary artery calcification on  CT chest  I25.10, I25.84   • Family history of early CAD grandmother  Z82.49   • Other hemochromatosis E83.118   • Elevated fasting glucose R73.01   • Erectile dysfunction N52.9   • COPD (chronic obstructive pulmonary disease) (HCC) J44.9   • Non-seasonal allergic rhinitis J30.89     SURGERIES:  Past Surgical History:   Procedure Laterality Date   • BICEPS TENDONESIS SUBPECTORALIS REPAIR Right 6/28/2019    Procedure: BICEPS TENODESIS - RIGHT;  Surgeon: Yeison Agudelo MD;  Location: [facility] OR;  Service: Orthopedics   • COLONOSCOPY N/A 2/17/2020    Procedure: COLONOSCOPY WITH ANESTHESIA;  Surgeon: Cal Vela DO;  Location:  [facility] ENDOSCOPY;  Service: Gastroenterology;  Laterality: N/A;  preop; blood in stool  postop; polyps   PCP anand Jackson    • ENDOSCOPY N/A 2/25/2020    Procedure: ESOPHAGOGASTRODUODENOSCOPY WITH ANESTHESIA;  Surgeon: Cal Vela DO;  Location: [facility] ENDOSCOPY;  Service: Gastroenterology;  Laterality: N/A;  preop; blood in stool  postop; normal   PCP Anand Jackson    • SHOULDER ARTHROSCOPY W/ ROTATOR CUFF REPAIR Right 6/28/2019    Procedure: RIGHT SHOULDER ARTHROSCOPIC ROTATOR CUFF REPAIR, SUBACROMIAL DECOMPRESSION;  Surgeon: Yeison Agudelo MD;  Location: [facility] OR;  Service: Orthopedics     HEALTH MAINTENANCE ITEMS:  Health Maintenance Due   Topic Date Due   • TDAP/TD VACCINES (1 - Tdap) Never done   • ZOSTER VACCINE (1 of 2) Never done   • ANNUAL WELLNESS VISIT  Never done   • COVID-19 Vaccine (3 - Booster for Moderna series) 10/01/2021   • INFLUENZA VACCINE  Never done       <no information>  Last Completed Colonoscopy          COLORECTAL CANCER SCREENING (COLONOSCOPY - Every 5 Years) Next due on 2/17/2025 02/17/2020  Surgical Procedure: COLONOSCOPY    02/17/2020  COLONOSCOPY    01/27/2020  POCT Occult blood x 3, stool              Immunization History   Administered Date(s) Administered   • COVID-19 (MODERNA) 1st, 2nd, 3rd Dose Only 07/06/2021, 08/06/2021     Last Completed  Mammogram     This patient has no relevant Health Maintenance data.            FAMILY HISTORY:  Family History   Problem Relation Age of Onset   • Cancer Mother    • Diabetes Mother    • Colon polyps Mother    • Hyperlipidemia Father    • Hypertension Father    • Cancer Father    • Colon cancer Neg Hx      SOCIAL HISTORY:  Social History     Socioeconomic History   • Marital status: Single   • Years of education: 10   • Highest education level: GED or equivalent   Tobacco Use   • Smoking status: Former     Packs/day: 1.00     Years: 22.00     Pack years: 22.00     Types: Cigars, Cigarettes     Quit date: 10/2015     Years since quittin.1   • Smokeless tobacco: Former     Quit date:    Vaping Use   • Vaping Use: Never used   Substance and Sexual Activity   • Alcohol use: Yes     Alcohol/week: 3.0 standard drinks     Types: 3 Cans of beer per week     Comment: weekly   • Drug use: Yes     Types: Marijuana   • Sexual activity: Yes     Partners: Female       REVIEW OF SYSTEMS:  Review of Systems   Constitutional: Positive for fatigue. Negative for activity change, appetite change, fever, unexpected weight gain and unexpected weight loss.   HENT: Negative for dental problem, facial swelling, swollen glands and trouble swallowing.    Eyes: Negative for double vision and discharge.   Respiratory: Positive for shortness of breath. Negative for cough and wheezing.    Cardiovascular: Negative for chest pain, palpitations and leg swelling.   Gastrointestinal: Negative for abdominal pain, blood in stool, nausea and vomiting.   Endocrine: Negative.    Genitourinary: Negative for dysuria and hematuria.   Musculoskeletal: Positive for gait problem. Negative for arthralgias and myalgias.   Skin: Negative for rash, skin lesions and wound.   Allergic/Immunologic: Negative for immunocompromised state.   Neurological: Negative for speech difficulty, light-headedness, headache, memory problem and confusion.   Hematological:  "Negative for adenopathy.   Psychiatric/Behavioral: Negative for self-injury, suicidal ideas and depressed mood. The patient is not nervous/anxious.          /72   Pulse 96   Temp 98 °F (36.7 °C)   Resp 16   Ht 170.2 cm (67\")   Wt 85.8 kg (189 lb 1.6 oz)   SpO2 94%   BMI 29.62 kg/m²  Body surface area is 1.97 meters squared.      Physical Exam:  Physical Exam  Constitutional:       Appearance: Normal appearance.   HENT:      Head: Normocephalic and atraumatic.   Eyes:      Extraocular Movements: Extraocular movements intact.   Cardiovascular:      Rate and Rhythm: Normal rate and regular rhythm.   Pulmonary:      Effort: Pulmonary effort is normal.      Breath sounds: Normal breath sounds.   Abdominal:      General: Abdomen is flat.      Palpations: Abdomen is soft.   Musculoskeletal:      Comments: Abnormal gait r/t CMT.  Uses cane for ambulation  Contracted hands     Skin:     General: Skin is warm and dry.   Neurological:      General: No focal deficit present.      Mental Status: He is alert and oriented to person, place, and time.      Deep Tendon Reflexes: Reflexes normal.         Iam Gibbs reports a pain score of 0. No intervention indicated.        ASSESSMENT / PLAN    1. 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 Hypertension, unspecified type    3. Charcot-Claire-Tooth disease            ASSESSMENT:   1.  Hemochromatosis   -Pt has compound Heterozygous Hemochromotosis one copy of C282Y and one copy of S65C.  -He also has Charcot-Claire Tooth Disease.  -Liver Function Tests have been normal.  -Ferritin 87.11 .  Hgb 16.7, Hct 51.8.  -Will continue with phlebotomy today for 500 cc whole blood.       2. Hypertension  -BP stable today  122/72  -Pt is taking HCTZ, and Losartan  -Managed by PCP    3.  Charcot-Claire-Tooth Disease  -Abnormal gait, contracted hands  -Managed by neurology     PLAN:  -We will perform therapeutic phlebotomy today.    -500 " cc whole blood was removed.  Pt tolerated phlebotomy well w/o s/s of hypovolemia or hypotension. -We will continue to perform phlebotomy monthly for ferritin  <50.    - He will RTC for visit with provider in 4 months. Pre office labs for CBC, CMP, Ferritin   -He will continue all medications and treatment plans per PCP and other providers.   -Care discussed with patient.  Understanding expressed.  Patient agreeable with plan.    PHLEBOTOMY TODAY        Nat Paez, APRN  11/29/2022

## 2022-12-07 ENCOUNTER — OFFICE VISIT (OUTPATIENT)
Dept: PULMONOLOGY | Facility: CLINIC | Age: 57
End: 2022-12-07

## 2022-12-07 VITALS
OXYGEN SATURATION: 95 % | WEIGHT: 192.4 LBS | SYSTOLIC BLOOD PRESSURE: 148 MMHG | DIASTOLIC BLOOD PRESSURE: 86 MMHG | HEIGHT: 66 IN | HEART RATE: 77 BPM | BODY MASS INDEX: 30.92 KG/M2

## 2022-12-07 DIAGNOSIS — G60.0 CHARCOT-MARIE-TOOTH DISEASE: ICD-10-CM

## 2022-12-07 DIAGNOSIS — Z86.16 HISTORY OF COVID-19: ICD-10-CM

## 2022-12-07 DIAGNOSIS — J30.89 NON-SEASONAL ALLERGIC RHINITIS, UNSPECIFIED TRIGGER: ICD-10-CM

## 2022-12-07 DIAGNOSIS — G47.33 OBSTRUCTIVE SLEEP APNEA: ICD-10-CM

## 2022-12-07 DIAGNOSIS — E66.9 OBESITY (BMI 30-39.9): ICD-10-CM

## 2022-12-07 DIAGNOSIS — Z87.891 FORMER SMOKER: ICD-10-CM

## 2022-12-07 DIAGNOSIS — F41.8 OTHER SPECIFIED ANXIETY DISORDERS: ICD-10-CM

## 2022-12-07 DIAGNOSIS — J44.9 CHRONIC OBSTRUCTIVE PULMONARY DISEASE, UNSPECIFIED COPD TYPE: ICD-10-CM

## 2022-12-07 DIAGNOSIS — J44.9 CHRONIC OBSTRUCTIVE PULMONARY DISEASE, UNSPECIFIED COPD TYPE: Primary | ICD-10-CM

## 2022-12-07 PROCEDURE — 99214 OFFICE O/P EST MOD 30 MIN: CPT | Performed by: INTERNAL MEDICINE

## 2022-12-07 RX ORDER — BUDESONIDE, GLYCOPYRROLATE, AND FORMOTEROL FUMARATE 160; 9; 4.8 UG/1; UG/1; UG/1
2 AEROSOL, METERED RESPIRATORY (INHALATION) 2 TIMES DAILY
COMMUNITY
End: 2023-04-06

## 2022-12-07 NOTE — PROGRESS NOTES
[facility] OUTPATIENT PROGRESS NOTE    Patient: Iam Gibbs  : 1965  Age: 57 y.o.  Date of Service: 2022    REASON FOR CLINIC VISIT:  Chief Complaint   Patient presents with   • Chronic obstructive pulmonary disease, unspecified COPD type     No pulmonary function test recent pneumonia        Subjective:    History of Present Illness:  Iam Gibbs is a 57 y.o. male who presents to the office today to be seen for    Diagnosis Plan   1. Chronic obstructive pulmonary disease, unspecified COPD type (HCC)        2. Obstructive sleep apnea (cpap)        3. Former smoker        4. History of COVID-19        5. Non-seasonal allergic rhinitis, unspecified trigger        6. Obesity (BMI 30-39.9)        7. Charcot-Claire-Tooth disease        8. Other specified anxiety disorders        .  Other problems per record.  Patient is a very pleasant middle aged  gentleman was seen in the pulmonary clinic for follow-up visit.    Patient is a former smoker and has chronic obstructive pulm disease.  He also has Charcot's nares to disease with neurologic deficit and walks with a cane.  He has obstructive sleep apnea and use CPAP in the past but did not tolerate it well but did tolerate BiPAP before.  During his last clinic visit he was advised to have a sleep study done and he had a home sleep study done and he did try on auto titrating CPAP between 16/8 which is not very well-tolerated and he could not continue it.  He wanted to go back on the BiPAP.  His home sleep study also showed significant oxygen desaturation at night and he did qualify for 2 L oxygen at night.  His settings is changed to BiPAP 16/8 with a flex settings of 3 and 2 L oxygen started with the BiPAP at night.      The patient also has COPD and he was using Breztri and albuterol nebulizer and rescue inhalers but currently not using Breztri diligently using it on and off.  He has nasal allergy and uses Astelin  nasal spray fluticasone and loratadine.  He did not have any hospitalizations and ER visit and urgent care visit.  He had COVID infection twice in the past the last one a few months ago.  He did not get COVID booster vaccine and did not take influenza vaccine.  He is not using any oxygen at this time.  Has anxiety issues and uses buspirone for anxiety.  Recently had upper respiratory infection and treated with antibiotics and unable to do the pulmonary function test which was scheduled for today.    He had a low-dose CT screening of the chest done in January when he did not show any lung nodules and recent CT scan done after COVID in September is also clear.  I explained the CT scan results to the patient.    PFT done today:  Not done today      No results found for this or any previous visit.         Bronchodilator therapy: Not using Breztri as advised . Uses Albuterol    Smoking Status:   Social History     Tobacco Use   Smoking Status Former   • Packs/day: 1.00   • Years: 32.00   • Pack years: 32.00   • Types: Cigars, Cigarettes   • Start date:    • Quit date: 10/2015   • Years since quittin.1   Smokeless Tobacco Former   • Quit date:      Pulm Rehab: no  Sleep: yes, not tolerating CPAP    Support System: lives alone    Code Status:   There are no questions and answers to display.        Review of Systems:  A complete review of systems is performed and all other systems were reviewed and negative as note above in the HPI.  Review of Systems   Constitutional: Positive for fatigue and unexpected weight gain.   HENT: Positive for congestion, postnasal drip and sinus pressure.    Eyes: Negative.    Respiratory: Positive for chest tightness and shortness of breath.    Cardiovascular: Negative.    Gastrointestinal: Negative.    Endocrine: Negative.    Genitourinary: Negative.    Musculoskeletal: Positive for arthralgias, back pain and myalgias.   Skin: Negative.    Allergic/Immunologic: Positive for  environmental allergies.   Neurological: Negative.    Hematological: Negative.    Psychiatric/Behavioral: The patient is nervous/anxious.        CAT/ACT Score:  Not done today    Medications:  Outpatient Encounter Medications as of 12/7/2022   Medication Sig Dispense Refill   • acetaminophen (TYLENOL) 500 MG tablet Take 500 mg by mouth Every 6 (Six) Hours As Needed for Mild Pain .     • albuterol (PROVENTIL) (2.5 MG/3ML) 0.083% nebulizer solution Take 2.5 mg by nebulization 3 (Three) Times a Day. 90 each 1   • allopurinol (ZYLOPRIM) 100 MG tablet TAKE ONE (1) TABLET BY MOUTH DAILY. 90 tablet 1   • ALPRAZolam (Xanax) 0.5 MG tablet Take 1 tablet by mouth 2 (Two) Times a Day As Needed for Anxiety. (Patient taking differently: Take 0.5 mg by mouth As Needed for Anxiety.) 30 tablet 0   • Azelastine HCl 137 MCG/SPRAY solution 2 sprays into the nostril(s) as directed by provider 2 (Two) Times a Day. 60 mL 5   • Budeson-Glycopyrrol-Formoterol (Breztri Aerosphere) 160-9-4.8 MCG/ACT aerosol inhaler Inhale 2 puffs 2 (Two) Times a Day.     • buPROPion XL (WELLBUTRIN XL) 150 MG 24 hr tablet Take 1 tablet by mouth Daily. 90 tablet 1   • busPIRone (BUSPAR) 15 MG tablet TAKE 1/2 (one-half) TABLET BY MOUTH 2 (TWO) TIMES A DAY. 30 tablet 3   • gemfibrozil (LOPID) 600 MG tablet TAKE ONE (1) TABLET BY MOUTH DAILY. NEEDS OFFICE APPT. 90 tablet 4   • hydroCHLOROthiazide (HYDRODIURIL) 25 MG tablet TAKE ONE (1) TABLET BY MOUTH DAILY 90 tablet 1   • ibuprofen (ADVIL,MOTRIN) 600 MG tablet Take 600 mg by mouth Every 6 (Six) Hours As Needed for Mild Pain .     • loratadine (CLARITIN) 10 MG tablet Take 1 tablet by mouth Daily. 90 tablet 3   • losartan (COZAAR) 25 MG tablet TAKE ONE (1) TABLET BY MOUTH DAILY. 90 tablet 4   • pantoprazole (PROTONIX) 40 MG EC tablet TAKE ONE (1) TABLET BY MOUTH DAILY 90 tablet 3   • tadalafil (Cialis) 20 MG tablet Take 1 tablet by mouth Daily As Needed for Erectile Dysfunction. 30 tablet 1   • Ventolin  (90  "Base) MCG/ACT inhaler INHALE 2 PUFFS EVERY 4 (FOUR) HOURS AS NEEDED FOR WHEEZING OR SHORTNESS OF AIR. 18 g 5   • [] predniSONE (DELTASONE) 20 MG tablet Take 1 tablet by mouth Daily for 5 days. 5 tablet 0   • [DISCONTINUED] Budeson-Glycopyrrol-Formoterol (Breztri Aerosphere) 160-9-4.8 MCG/ACT aerosol inhaler Inhale 2 puffs 2 (Two) Times a Day. 10.7 each 5   • [DISCONTINUED] fluticasone (Flonase Allergy Relief) 50 MCG/ACT nasal spray 2 sprays into the nostril(s) as directed by provider Daily. 16 g 5   • [DISCONTINUED] levoFLOXacin (Levaquin) 500 MG tablet Take 1 tablet by mouth Daily for 10 days. 10 tablet 0     No facility-administered encounter medications on file as of 2022.       Allergies:  Allergies   Allergen Reactions   • Adhesive Tape Rash     VERY SENSITIVE TO ADHESIVE    • Latex Rash and Unknown - High Severity       Immunizations:  Immunization History   Administered Date(s) Administered   • COVID-19 (MODERNA) 1st, 2nd, 3rd Dose Only 2021, 2021       Objective:    Vitals:  /86   Pulse 77   Ht 167.6 cm (66\")   Wt 87.3 kg (192 lb 6.4 oz)   SpO2 95%   BMI 31.05 kg/m²     Physical Exam:  General: Patient is a 57 y.o. middle aged pleasant  male. Looks stated age. Appears to be in no acute distress.  Eyes: EOMI. PERRLA. Vision intact. No scleral icterus.  Ear, Nose, Mouth and Throat: Hearing is grossly intact. No Leukoplakia, pharyngitis, stomatitis or thrush. Swollen nasal mucosa with post nasal drop.  Neck: Range of motion of neck normal. No thyromegaly or masses. Mallampati Class 3  Respiratory: Clear to auscultation bilaterally. No use of accessory muscles. Decreased breath sounds.  Cardiovascular: Normal heart sounds. Regularly regular rhythm without murmur.  Gastrointestinal: Non tender, non distended, soft. Bowel sounds positive in all four quadrants. No organomegaly.  Skin: No obvious rashes, lesions, ulcers or large amount of bruising. No edema. "   Neurological: No new motor deficits. Cranial nerves appear intact.Has neurologic features.   Psychiatric: Patient is alert and oriented to person, place and time.    Chest Imaging:    Study Result  Narrative & Impression   CT CHEST LOW DOSE CANCER SCREENING WO- 1/19/2022 1:26 PM CST     HISTORY: Lung cancer screening     COMPARISON: None     DOSE LENGTH PRODUCT: 44.4 mGy cm. Automated exposure control was also  utilized to decrease patient radiation dose.     TECHNIQUE: Noncontrast CT of the chest was performed using low-dose  protocol.     FINDINGS:      Nodules: Left lower lobe atelectasis/scarring.     Other lung findings: Emphysema.     Airway: The airway is patent.     Pleura: No mass or fluid collection.     Aorta and great vessels: Atherosclerosis is seen. There is no aneurysm  identified.     Heart and pericardium: The heart is normal in size. Coronary artery  calcifications are seen. No pericardial effusion.     Lymph nodes: No pathologic lymphadenopathy is noted.     Bones and soft tissues: No acute osseous or soft tissue abnormality is  seen.     Upper abdomen: No acute process is seen in the upper abdomen.     IMPRESSION:  1. No suspicious pulmonary nodule.  2. Lung-RADS 1: Negative      No nodules and definitely benign nodules.     Continue annual screening with low-dose CT in 12 months.        ACR Lung-RADS version 1.1 Categories and Recommendations:     1: Negative -- Recommend continued annual screening with low-dose CT in  12 months.     2: Benign appearance or behavior. -- Recommend continued annual  screening with low-dose CT in 12 months.     3: Probably benign -- Recommend low-dose CT in 6 months.     4A: Probably suspicious (5-15% of malignancy)--recommend low-dose CT in  3 months. PET/CT may be used when there is a a solid component of at  least 8 mm.     4B: Suspicious (greater than 15% chance of malignancy)--options for  additional evaluation include chest CT, PET/CT and/or tissue  sampling  depending on clinical morbidities. For new large nodules developing on  an annual repeat screening CT, a one-month low-dose CT may be used to  address potentially infectious or inflammatory conditions.     4X: Suspicious --category 3 or 4 nodules with additional features are  imaging that increases the suspicion of malignancy. Recommendation same  as 4B.     S: Other clinically significant or potentially clinically significant  findings--recommendation depends on finding.  This report was finalized on 01/19/2022 13:44 by Dr. Ania Kendrick MD.       Assessment:  1. Chronic obstructive pulmonary disease, unspecified COPD type (HCC)    2. Obstructive sleep apnea (cpap)    3. Former smoker    4. History of COVID-19    5. Non-seasonal allergic rhinitis, unspecified trigger    6. Obesity (BMI 30-39.9)    7. Charcot-Claire-Tooth disease    8. Other specified anxiety disorders        Plan/Recommendations:    1.  Patient had a lot of questions about his CPAP usage and settings and told me he is unable to tolerate the CPAP auto titrating mode and it was changed to BiPAP 16/8 with 3 of flex settings and 2 L oxygen at night for hypoxemia.  We will monitor the BiPAP usage and make further adjustment if needed.  He needs more compliance with the BiPAP usage.  2.  He is not using his inhalers properly and I told him to use Breztri regularly as advised 2 puffs twice a day and use albuterol nebulizer and rescue inhaler as needed.  No prescription refill was needed  3.  He has allergy symptoms and he can use allergy medications with fluticasone nasal spray, Astelin nasal spray and loratadine as before.  As he has anxiety issues I advised him to use buspirone before starting the BiPAP and oxygen at night.  4.  Patient is going to get another low-dose CT screening of the chest which is ordered by Dr. Jackson and will be done next year as per current guidelines.  He is already vaccinated for COVID but will need a booster dose  he does not want to take influenza vaccine.  Continue follow-up with the primary care forward and return to the pulmonary clinic for a follow-up visit in 3 months time or earlier if needed.    Follow up:  3 Months    Time Spent:  30 minutes    I appreciate the opportunity of participating in this patient's care. I would like to thank the PCP for the referral.  Please feel free to contact me with any other questions.    Shanice Hebert MD   Pulmonologist/Intensivist     Electronically signed by: Shanice Hebert MD, 12/7/2022 16:07 CST

## 2023-01-10 ENCOUNTER — TELEPHONE (OUTPATIENT)
Dept: ONCOLOGY | Facility: CLINIC | Age: 58
End: 2023-01-10

## 2023-01-10 NOTE — TELEPHONE ENCOUNTER
Caller: Iam Gibbs    Relationship to patient: Self    Best call back number: [phone]    Type of visit: LAB & NURSE VISIT     Requested date: 1/18/2023 AROUND 10 AM , CALL TO DISCUSS    If rescheduling, when is the original appointment: 1/24/2023

## 2023-01-18 ENCOUNTER — LAB (OUTPATIENT)
Dept: LAB | Facility: HOSPITAL | Age: 58
End: 2023-01-18
Payer: MEDICARE

## 2023-01-18 ENCOUNTER — HOSPITAL ENCOUNTER (OUTPATIENT)
Dept: CARDIOLOGY | Facility: HOSPITAL | Age: 58
Discharge: HOME | End: 2023-01-18
Admitting: INTERNAL MEDICINE
Payer: MEDICARE

## 2023-01-18 ENCOUNTER — CLINICAL SUPPORT (OUTPATIENT)
Dept: ONCOLOGY | Facility: CLINIC | Age: 58
End: 2023-01-18
Payer: MEDICARE

## 2023-01-18 VITALS
WEIGHT: 192 LBS | DIASTOLIC BLOOD PRESSURE: 74 MMHG | SYSTOLIC BLOOD PRESSURE: 136 MMHG | BODY MASS INDEX: 30.86 KG/M2 | HEIGHT: 66 IN

## 2023-01-18 DIAGNOSIS — E83.110 HEMOCHROMATOSIS ASSOCIATED WITH COMPOUND HETEROZYGOUS MUTATION IN HFE GENE: ICD-10-CM

## 2023-01-18 DIAGNOSIS — E83.118 OTHER HEMOCHROMATOSIS: ICD-10-CM

## 2023-01-18 DIAGNOSIS — R00.2 PALPITATIONS: ICD-10-CM

## 2023-01-18 LAB
BASOPHILS # BLD AUTO: 0.08 10*3/MM3 (ref 0–0.2)
BASOPHILS NFR BLD AUTO: 1.3 % (ref 0–1.5)
DEPRECATED RDW RBC AUTO: 42.9 FL (ref 37–54)
EOSINOPHIL # BLD AUTO: 0.28 10*3/MM3 (ref 0–0.4)
EOSINOPHIL NFR BLD AUTO: 4.6 % (ref 0.3–6.2)
ERYTHROCYTE [DISTWIDTH] IN BLOOD BY AUTOMATED COUNT: 13.2 % (ref 12.3–15.4)
FERRITIN SERPL-MCNC: 43.89 NG/ML (ref 30–400)
HCT VFR BLD AUTO: 47.8 % (ref 37.5–51)
HGB BLD-MCNC: 15.8 G/DL (ref 13–17.7)
IMM GRANULOCYTES # BLD AUTO: 0.02 10*3/MM3 (ref 0–0.05)
IMM GRANULOCYTES NFR BLD AUTO: 0.3 % (ref 0–0.5)
LYMPHOCYTES # BLD AUTO: 1.17 10*3/MM3 (ref 0.7–3.1)
LYMPHOCYTES NFR BLD AUTO: 19.3 % (ref 19.6–45.3)
MCH RBC QN AUTO: 29.6 PG (ref 26.6–33)
MCHC RBC AUTO-ENTMCNC: 33.1 G/DL (ref 31.5–35.7)
MCV RBC AUTO: 89.5 FL (ref 79–97)
MONOCYTES # BLD AUTO: 0.75 10*3/MM3 (ref 0.1–0.9)
MONOCYTES NFR BLD AUTO: 12.4 % (ref 5–12)
NEUTROPHILS NFR BLD AUTO: 3.76 10*3/MM3 (ref 1.7–7)
NEUTROPHILS NFR BLD AUTO: 62.1 % (ref 42.7–76)
NRBC BLD AUTO-RTO: 0 /100 WBC (ref 0–0.2)
PLATELET # BLD AUTO: 239 10*3/MM3 (ref 140–450)
PMV BLD AUTO: 10.4 FL (ref 6–12)
RBC # BLD AUTO: 5.34 10*6/MM3 (ref 4.14–5.8)
WBC NRBC COR # BLD: 6.06 10*3/MM3 (ref 3.4–10.8)

## 2023-01-18 PROCEDURE — 93356 MYOCRD STRAIN IMG SPCKL TRCK: CPT | Performed by: INTERNAL MEDICINE

## 2023-01-18 PROCEDURE — 82728 ASSAY OF FERRITIN: CPT

## 2023-01-18 PROCEDURE — 93306 TTE W/DOPPLER COMPLETE: CPT

## 2023-01-18 PROCEDURE — 36415 COLL VENOUS BLD VENIPUNCTURE: CPT

## 2023-01-18 PROCEDURE — 85025 COMPLETE CBC W/AUTO DIFF WBC: CPT

## 2023-01-18 PROCEDURE — 99211 OFF/OP EST MAY X REQ PHY/QHP: CPT | Performed by: NURSE PRACTITIONER

## 2023-01-18 PROCEDURE — 93306 TTE W/DOPPLER COMPLETE: CPT | Performed by: INTERNAL MEDICINE

## 2023-01-18 PROCEDURE — 93356 MYOCRD STRAIN IMG SPCKL TRCK: CPT

## 2023-01-18 NOTE — PROGRESS NOTES
Patient here for lab evaluation for possible 500 ml phlebotomy for hemachromatosis for goal ferritin <50.  Patient denies any problems today.  Skin warm, dry, and color within normal limits.  Labs reviewed ferritin 43.9.  Patient does not need a phlebotomy today.  Will return in 4 weeks for repeat labs and possible phlebotomy.

## 2023-01-20 LAB
BH CV ECHO LEFT VENTRICLE GLOBAL LONGITUDINAL STRAIN: -9.5 %
BH CV ECHO MEAS - AO MAX PG: 7.8 MMHG
BH CV ECHO MEAS - AO MEAN PG: 4 MMHG
BH CV ECHO MEAS - AO ROOT DIAM: 2.8 CM
BH CV ECHO MEAS - AO V2 MAX: 140 CM/SEC
BH CV ECHO MEAS - AO V2 VTI: 22.1 CM
BH CV ECHO MEAS - AVA(I,D): 3.3 CM2
BH CV ECHO MEAS - EDV(CUBED): 117.6 ML
BH CV ECHO MEAS - EDV(MOD-SP2): 51 ML
BH CV ECHO MEAS - EDV(MOD-SP4): 59.9 ML
BH CV ECHO MEAS - EF(MOD-BP): 51.9 %
BH CV ECHO MEAS - EF(MOD-SP2): 53.3 %
BH CV ECHO MEAS - EF(MOD-SP4): 49.9 %
BH CV ECHO MEAS - ESV(CUBED): 22 ML
BH CV ECHO MEAS - ESV(MOD-SP2): 23.8 ML
BH CV ECHO MEAS - ESV(MOD-SP4): 30 ML
BH CV ECHO MEAS - FS: 42.9 %
BH CV ECHO MEAS - IVS/LVPW: 1.19 CM
BH CV ECHO MEAS - IVSD: 0.95 CM
BH CV ECHO MEAS - LA DIMENSION: 3.8 CM
BH CV ECHO MEAS - LAT PEAK E' VEL: 10.8 CM/SEC
BH CV ECHO MEAS - LV DIASTOLIC VOL/BSA (35-75): 30.5 CM2
BH CV ECHO MEAS - LV MASS(C)D: 147.4 GRAMS
BH CV ECHO MEAS - LV MAX PG: 6.6 MMHG
BH CV ECHO MEAS - LV MEAN PG: 3 MMHG
BH CV ECHO MEAS - LV SYSTOLIC VOL/BSA (12-30): 15.3 CM2
BH CV ECHO MEAS - LV V1 MAX: 128 CM/SEC
BH CV ECHO MEAS - LV V1 VTI: 20.8 CM
BH CV ECHO MEAS - LVIDD: 4.9 CM
BH CV ECHO MEAS - LVIDS: 2.8 CM
BH CV ECHO MEAS - LVOT AREA: 3.5 CM2
BH CV ECHO MEAS - LVOT DIAM: 2.1 CM
BH CV ECHO MEAS - LVPWD: 0.8 CM
BH CV ECHO MEAS - MED PEAK E' VEL: 5.1 CM/SEC
BH CV ECHO MEAS - MR MAX PG: 145 MMHG
BH CV ECHO MEAS - MR MAX VEL: 602 CM/SEC
BH CV ECHO MEAS - MV A MAX VEL: 108 CM/SEC
BH CV ECHO MEAS - MV DEC SLOPE: 300 CM/SEC2
BH CV ECHO MEAS - MV E MAX VEL: 80.6 CM/SEC
BH CV ECHO MEAS - MV E/A: 0.75
BH CV ECHO MEAS - MV P1/2T: 82.8 MSEC
BH CV ECHO MEAS - MVA(P1/2T): 2.7 CM2
BH CV ECHO MEAS - PA V2 MAX: 134 CM/SEC
BH CV ECHO MEAS - RAP SYSTOLE: 5 MMHG
BH CV ECHO MEAS - RV MAX PG: 3 MMHG
BH CV ECHO MEAS - RV V1 MAX: 86.5 CM/SEC
BH CV ECHO MEAS - RVDD: 2.5 CM
BH CV ECHO MEAS - RVSP: 32.2 MMHG
BH CV ECHO MEAS - SI(MOD-SP2): 13.8 ML/M2
BH CV ECHO MEAS - SI(MOD-SP4): 15.2 ML/M2
BH CV ECHO MEAS - SV(LVOT): 72 ML
BH CV ECHO MEAS - SV(MOD-SP2): 27.2 ML
BH CV ECHO MEAS - SV(MOD-SP4): 29.9 ML
BH CV ECHO MEAS - TAPSE (>1.6): 1.38 CM
BH CV ECHO MEAS - TR MAX PG: 27.2 MMHG
BH CV ECHO MEAS - TR MAX VEL: 261 CM/SEC
BH CV ECHO MEASUREMENTS AVERAGE E/E' RATIO: 10.14
BH CV XLRA - RV BASE: 3 CM
LEFT ATRIUM VOLUME INDEX: 13.2 ML/M2
LEFT ATRIUM VOLUME: 26 ML
MAXIMAL PREDICTED HEART RATE: 165 BPM
STRESS TARGET HR: 140 BPM

## 2023-03-13 ENCOUNTER — OFFICE VISIT (OUTPATIENT)
Dept: FAMILY MEDICINE CLINIC | Facility: CLINIC | Age: 58
End: 2023-03-13
Payer: MEDICARE

## 2023-03-13 VITALS
OXYGEN SATURATION: 93 % | SYSTOLIC BLOOD PRESSURE: 128 MMHG | TEMPERATURE: 97.1 F | DIASTOLIC BLOOD PRESSURE: 82 MMHG | HEIGHT: 66 IN | HEART RATE: 102 BPM | RESPIRATION RATE: 18 BRPM | WEIGHT: 194 LBS | BODY MASS INDEX: 31.18 KG/M2

## 2023-03-13 DIAGNOSIS — J01.90 ACUTE NON-RECURRENT SINUSITIS, UNSPECIFIED LOCATION: Primary | ICD-10-CM

## 2023-03-13 PROCEDURE — 99213 OFFICE O/P EST LOW 20 MIN: CPT | Performed by: NURSE PRACTITIONER

## 2023-03-13 RX ORDER — FLUTICASONE PROPIONATE 50 MCG
SPRAY, SUSPENSION (ML) NASAL
COMMUNITY
Start: 2023-03-02

## 2023-03-13 RX ORDER — PREDNISONE 20 MG/1
20 TABLET ORAL DAILY
Qty: 5 TABLET | Refills: 0 | Status: SHIPPED | OUTPATIENT
Start: 2023-03-13 | End: 2023-03-18

## 2023-03-13 RX ORDER — AMOXICILLIN AND CLAVULANATE POTASSIUM 875; 125 MG/1; MG/1
1 TABLET, FILM COATED ORAL 2 TIMES DAILY
Qty: 20 TABLET | Refills: 0 | Status: SHIPPED | OUTPATIENT
Start: 2023-03-13 | End: 2023-03-23

## 2023-03-13 NOTE — PROGRESS NOTES
Subjective   Chief Complaint:  Sore throat.    History of Present Illness:  This 58 y.o. male was seen in the office today.      The patient presents today with complaints of a scratchy throat, headache, nausea, and diarrhea. He reports he has been feeling well since 03/08/2023. He denies any tick bites in the last 2 weeks. He denies any ear pain. The patient reports he is coughing up green, thick, and sticky phlegm. He reports he has sinus drainage. He denies any bloody phlegm. The patient reports he noticed pea-sized red blotches all over his throat on 03/12/2023.    The patient reports he uses Flonase daily.    Allergies   Allergen Reactions   • Adhesive Tape Rash     VERY SENSITIVE TO ADHESIVE    • Latex Rash and Unknown - High Severity      Current Outpatient Medications on File Prior to Visit   Medication Sig   • acetaminophen (TYLENOL) 500 MG tablet Take 1 tablet by mouth Every 6 (Six) Hours As Needed for Mild Pain.   • albuterol (PROVENTIL) (2.5 MG/3ML) 0.083% nebulizer solution Take 2.5 mg by nebulization 3 (Three) Times a Day.   • allopurinol (ZYLOPRIM) 100 MG tablet TAKE ONE (1) TABLET BY MOUTH DAILY.   • ALPRAZolam (Xanax) 0.5 MG tablet Take 1 tablet by mouth 2 (Two) Times a Day As Needed for Anxiety. (Patient taking differently: Take 1 tablet by mouth As Needed for Anxiety.)   • Azelastine HCl 137 MCG/SPRAY solution 2 sprays into the nostril(s) as directed by provider 2 (Two) Times a Day.   • buPROPion XL (WELLBUTRIN XL) 150 MG 24 hr tablet Take 1 tablet by mouth Daily.   • busPIRone (BUSPAR) 15 MG tablet TAKE 1/2 (one-half) TABLET BY MOUTH 2 (TWO) TIMES A DAY.   • fluticasone (FLONASE) 50 MCG/ACT nasal spray    • gemfibrozil (LOPID) 600 MG tablet TAKE ONE (1) TABLET BY MOUTH DAILY. NEEDS OFFICE APPT.   • hydroCHLOROthiazide (HYDRODIURIL) 25 MG tablet TAKE ONE (1) TABLET BY MOUTH DAILY   • ibuprofen (ADVIL,MOTRIN) 600 MG tablet Take 1 tablet by mouth Every 6 (Six) Hours As Needed for Mild Pain.   •  loratadine (CLARITIN) 10 MG tablet Take 1 tablet by mouth Daily.   • losartan (COZAAR) 25 MG tablet TAKE ONE (1) TABLET BY MOUTH DAILY.   • pantoprazole (PROTONIX) 40 MG EC tablet TAKE ONE (1) TABLET BY MOUTH DAILY   • tadalafil (Cialis) 20 MG tablet Take 1 tablet by mouth Daily As Needed for Erectile Dysfunction.   • Ventolin  (90 Base) MCG/ACT inhaler INHALE 2 PUFFS EVERY 4 (FOUR) HOURS AS NEEDED FOR WHEEZING OR SHORTNESS OF AIR.   • Budeson-Glycopyrrol-Formoterol (Breztri Aerosphere) 160-9-4.8 MCG/ACT aerosol inhaler Inhale 2 puffs 2 (Two) Times a Day.     No current facility-administered medications on file prior to visit.      Past Medical, Surgical, Social, and Family History:  Past Medical History:   Diagnosis Date   • Anemia    • Arthritis    • Back pain    • Charcot-Claire-Tooth disease     DX AT 19    • Claustrophobia    • COPD (chronic obstructive pulmonary disease) (HCC)    • Elevated cholesterol    • Gait abnormality     DUE TO CMT    • GERD (gastroesophageal reflux disease)    • Hyperlipidemia    • Hypertension    • Irritable bowel    • Other diseases of vocal cords     NARROW VOICE BOX    • Sleep apnea     DOES NOT USE BIPAP OR CPAP      Past Surgical History:   Procedure Laterality Date   • BICEPS TENDONESIS SUBPECTORALIS REPAIR Right 6/28/2019    Procedure: BICEPS TENODESIS - RIGHT;  Surgeon: Yeison Agudelo MD;  Location: [facility] OR;  Service: Orthopedics   • COLONOSCOPY N/A 2/17/2020    Procedure: COLONOSCOPY WITH ANESTHESIA;  Surgeon: Cal Vela DO;  Location: [facility] ENDOSCOPY;  Service: Gastroenterology;  Laterality: N/A;  preop; blood in stool  postop; polyps   PCP anand Jackson    • ENDOSCOPY N/A 2/25/2020    Procedure: ESOPHAGOGASTRODUODENOSCOPY WITH ANESTHESIA;  Surgeon: Cal Vela DO;  Location: [facility] ENDOSCOPY;  Service: Gastroenterology;  Laterality: N/A;  preop; blood in stool  postop; normal   PCP Anand Jackson    • SHOULDER ARTHROSCOPY W/ ROTATOR CUFF REPAIR Right  "2019    Procedure: RIGHT SHOULDER ARTHROSCOPIC ROTATOR CUFF REPAIR, SUBACROMIAL DECOMPRESSION;  Surgeon: Yeison Agudelo MD;  Location: [facility] OR;  Service: Orthopedics     Social History     Socioeconomic History   • Marital status: Single   • Years of education: 10   • Highest education level: GED or equivalent   Tobacco Use   • Smoking status: Former     Packs/day: 1.00     Years: 32.00     Pack years: 32.00     Types: Cigars, Cigarettes     Start date:      Quit date: 10/2015     Years since quittin.4   • Smokeless tobacco: Former     Quit date:    Vaping Use   • Vaping Use: Never used   Substance and Sexual Activity   • Alcohol use: Yes     Alcohol/week: 3.0 standard drinks     Types: 3 Cans of beer per week     Comment: weekly   • Drug use: Yes     Types: Marijuana   • Sexual activity: Yes     Partners: Female     Family History   Problem Relation Age of Onset   • Cancer Mother    • Diabetes Mother    • Colon polyps Mother    • Hyperlipidemia Father    • Hypertension Father    • Cancer Father    • Colon cancer Neg Hx        Objective   Physical Exam  Constitutional:       General: He is not in acute distress.  HENT:      Ears:      Comments: Bilateral ear congestion present, worse on the right.     Mouth/Throat:      Pharynx: Posterior oropharyngeal erythema present.   Cardiovascular:      Rate and Rhythm: Normal rate and regular rhythm.      Pulses: Normal pulses.      Heart sounds: No murmur heard.    No friction rub. No gallop.   Pulmonary:      Effort: Pulmonary effort is normal. No respiratory distress.      Breath sounds: Normal breath sounds. No wheezing or rhonchi.   Neurological:      Mental Status: He is alert.     /82 (BP Location: Left arm, Patient Position: Sitting, Cuff Size: Adult)   Pulse 102   Temp 97.1 °F (36.2 °C) (Infrared)   Resp 18   Ht 167.6 cm (66\")   Wt 88 kg (194 lb)   SpO2 93%   BMI 31.31 kg/m²     Prior Visit Notes/Records, Lab, Imaging, and Diagnostic " Results Reviewed:  CBC:  Lab Results - Last 18 Months   Lab Units 01/18/23  0944 11/29/22  0920 08/11/22  1228 07/21/22  1005 07/07/22  1234 07/05/22  0730 06/23/22  1056 05/26/22  0903 04/22/22  1008 01/03/22  0853   WBC 10*3/mm3 6.06 7.67 5.93 6.28 6.42 6.78 6.44   < > 7.56 7.03   HEMOGLOBIN g/dL 15.8 16.7 15.1 15.4 14.8 15.7 15.4   < > 16.4 17.3   HEMATOCRIT % 47.8 51.8* 45.9 45.3 43.2 46.5 46.0   < > 48.1 51.4*   PLATELETS 10*3/mm3 239 247 259 263 256 265 249   < > 229 251   IRON mcg/dL  --   --   --   --   --  71  --   --  120 99    < > = values in this interval not displayed.      Chemistry:  Lab Results - Last 18 Months   Lab Units 11/29/22  0920 07/05/22 0730 05/26/22  0903 04/22/22  1008 01/03/22  0853   SODIUM mmol/L 137 142 138 140 138   POTASSIUM mmol/L 4.1 4.1 3.8 4.1 4.2   CHLORIDE mmol/L 98 99 102 101 98   TOTAL CO2 mmol/L  --  30.2*  --   --  34.0*   CO2 mmol/L 31.0*  --  28.0 31.0*  --    GLUCOSE mg/dL 96 104* 90 112* 94   BUN mg/dL 20 14 26* 19 15   CREATININE mg/dL 0.96 0.92 0.95 0.85 1.00   EGFR IF NONAFRICN AM mL/min/1.73  --   --   --   --  78   EGFR IF AFRICN AM mL/min/1.73  --   --   --   --  94   EGFR RESULT mL/min/1.73  --  98.2  --   --   --    CALCIUM mg/dL 9.2 9.6 8.9 9.6 9.7     Lab Results - Last 18 Months   Lab Units 11/29/22  0920 07/05/22 0730 05/26/22  0903 04/22/22  1008 01/03/22  0853   ALT (SGPT) U/L 24 24 20 24 32   AST (SGOT) U/L 16 19 15 19 20   ALK PHOS U/L 54 54 50 47 55       Assessment & Plan   Diagnoses and all orders for this visit:    1. Acute non-recurrent sinusitis, unspecified location (Primary)    Other orders  -     predniSONE (DELTASONE) 20 MG tablet; Take 1 tablet by mouth Daily for 5 days.  Dispense: 5 tablet; Refill: 0  -     amoxicillin-clavulanate (Augmentin) 875-125 MG per tablet; Take 1 tablet by mouth 2 (Two) Times a Day for 10 days.  Dispense: 20 tablet; Refill: 0    Discussion:  Advised and educated plan of care. Advised Augmentin and prednisone.       Follow-up:  Return if symptoms worsen or fail to improve.    Transcribed from ambient dictation for RUEL Lyman by Faye Roger.  03/13/23   18:13 CDT    Patient or patient representative verbalized consent to the visit recording.  I have personally performed the services described in this document as transcribed by the above individual, and it is both accurate and complete.    Electronically signed by RUEL Bull 03/13/23, 6:13 PM CDT.

## 2023-03-21 ENCOUNTER — TELEPHONE (OUTPATIENT)
Dept: MRI IMAGING | Facility: HOSPITAL | Age: 58
End: 2023-03-21
Payer: MEDICARE

## 2023-03-22 DIAGNOSIS — E78.1 HYPERTRIGLYCERIDEMIA: ICD-10-CM

## 2023-03-22 DIAGNOSIS — F41.9 ANXIETY: ICD-10-CM

## 2023-03-22 RX ORDER — HYDROCHLOROTHIAZIDE 25 MG/1
TABLET ORAL
Qty: 90 TABLET | Refills: 10 | Status: SHIPPED | OUTPATIENT
Start: 2023-03-22

## 2023-03-22 RX ORDER — ALLOPURINOL 100 MG/1
TABLET ORAL
Qty: 90 TABLET | Refills: 10 | Status: SHIPPED | OUTPATIENT
Start: 2023-03-22

## 2023-03-28 ENCOUNTER — LAB (OUTPATIENT)
Dept: LAB | Facility: HOSPITAL | Age: 58
End: 2023-03-28
Payer: MEDICARE

## 2023-03-28 ENCOUNTER — HOSPITAL ENCOUNTER (OUTPATIENT)
Dept: CT IMAGING | Facility: HOSPITAL | Age: 58
Discharge: HOME | End: 2023-03-28
Payer: MEDICARE

## 2023-03-28 ENCOUNTER — OFFICE VISIT (OUTPATIENT)
Dept: ONCOLOGY | Facility: CLINIC | Age: 58
End: 2023-03-28
Payer: MEDICARE

## 2023-03-28 VITALS
HEIGHT: 66 IN | HEART RATE: 86 BPM | BODY MASS INDEX: 31.15 KG/M2 | RESPIRATION RATE: 16 BRPM | TEMPERATURE: 97 F | OXYGEN SATURATION: 93 % | DIASTOLIC BLOOD PRESSURE: 82 MMHG | WEIGHT: 193.8 LBS | SYSTOLIC BLOOD PRESSURE: 122 MMHG

## 2023-03-28 DIAGNOSIS — G60.0 CHARCOT-MARIE-TOOTH DISEASE: ICD-10-CM

## 2023-03-28 DIAGNOSIS — Z87.891 PERSONAL HISTORY OF TOBACCO USE, PRESENTING HAZARDS TO HEALTH: ICD-10-CM

## 2023-03-28 DIAGNOSIS — Z72.0 TOBACCO ABUSE: ICD-10-CM

## 2023-03-28 DIAGNOSIS — E83.110 HEMOCHROMATOSIS ASSOCIATED WITH COMPOUND HETEROZYGOUS MUTATION IN HFE GENE: Primary | ICD-10-CM

## 2023-03-28 DIAGNOSIS — F17.200 TOBACCO USE DISORDER: ICD-10-CM

## 2023-03-28 DIAGNOSIS — I10 HYPERTENSION, UNSPECIFIED TYPE: ICD-10-CM

## 2023-03-28 LAB
ALBUMIN SERPL-MCNC: 4.7 G/DL (ref 3.5–5.2)
ALBUMIN/GLOB SERPL: 1.7 G/DL
ALP SERPL-CCNC: 55 U/L (ref 39–117)
ALT SERPL W P-5'-P-CCNC: 30 U/L (ref 1–41)
ANION GAP SERPL CALCULATED.3IONS-SCNC: 11 MMOL/L (ref 5–15)
AST SERPL-CCNC: 22 U/L (ref 1–40)
BASOPHILS # BLD AUTO: 0.07 10*3/MM3 (ref 0–0.2)
BASOPHILS NFR BLD AUTO: 1 % (ref 0–1.5)
BILIRUB SERPL-MCNC: 0.3 MG/DL (ref 0–1.2)
BUN SERPL-MCNC: 20 MG/DL (ref 6–20)
BUN/CREAT SERPL: 19.8 (ref 7–25)
CALCIUM SPEC-SCNC: 9.6 MG/DL (ref 8.6–10.5)
CHLORIDE SERPL-SCNC: 98 MMOL/L (ref 98–107)
CO2 SERPL-SCNC: 32 MMOL/L (ref 22–29)
CREAT SERPL-MCNC: 1.01 MG/DL (ref 0.76–1.27)
DEPRECATED RDW RBC AUTO: 42.9 FL (ref 37–54)
EGFRCR SERPLBLD CKD-EPI 2021: 87.3 ML/MIN/1.73
EOSINOPHIL # BLD AUTO: 0.38 10*3/MM3 (ref 0–0.4)
EOSINOPHIL NFR BLD AUTO: 5.2 % (ref 0.3–6.2)
ERYTHROCYTE [DISTWIDTH] IN BLOOD BY AUTOMATED COUNT: 13 % (ref 12.3–15.4)
FERRITIN SERPL-MCNC: 87.22 NG/ML (ref 30–400)
GLOBULIN UR ELPH-MCNC: 2.7 GM/DL
GLUCOSE SERPL-MCNC: 99 MG/DL (ref 65–99)
HCT VFR BLD AUTO: 49.6 % (ref 37.5–51)
HGB BLD-MCNC: 16.2 G/DL (ref 13–17.7)
HOLD SPECIMEN: NORMAL
IMM GRANULOCYTES # BLD AUTO: 0.06 10*3/MM3 (ref 0–0.05)
IMM GRANULOCYTES NFR BLD AUTO: 0.8 % (ref 0–0.5)
LYMPHOCYTES # BLD AUTO: 1.49 10*3/MM3 (ref 0.7–3.1)
LYMPHOCYTES NFR BLD AUTO: 20.4 % (ref 19.6–45.3)
MCH RBC QN AUTO: 29.7 PG (ref 26.6–33)
MCHC RBC AUTO-ENTMCNC: 32.7 G/DL (ref 31.5–35.7)
MCV RBC AUTO: 90.8 FL (ref 79–97)
MONOCYTES # BLD AUTO: 0.99 10*3/MM3 (ref 0.1–0.9)
MONOCYTES NFR BLD AUTO: 13.5 % (ref 5–12)
NEUTROPHILS NFR BLD AUTO: 4.33 10*3/MM3 (ref 1.7–7)
NEUTROPHILS NFR BLD AUTO: 59.1 % (ref 42.7–76)
NRBC BLD AUTO-RTO: 0 /100 WBC (ref 0–0.2)
PLATELET # BLD AUTO: 241 10*3/MM3 (ref 140–450)
PMV BLD AUTO: 10.3 FL (ref 6–12)
POTASSIUM SERPL-SCNC: 3.8 MMOL/L (ref 3.5–5.2)
PROT SERPL-MCNC: 7.4 G/DL (ref 6–8.5)
RBC # BLD AUTO: 5.46 10*6/MM3 (ref 4.14–5.8)
SODIUM SERPL-SCNC: 141 MMOL/L (ref 136–145)
WBC NRBC COR # BLD: 7.32 10*3/MM3 (ref 3.4–10.8)

## 2023-03-28 PROCEDURE — 85025 COMPLETE CBC W/AUTO DIFF WBC: CPT

## 2023-03-28 PROCEDURE — 82728 ASSAY OF FERRITIN: CPT

## 2023-03-28 PROCEDURE — 36415 COLL VENOUS BLD VENIPUNCTURE: CPT

## 2023-03-28 PROCEDURE — 80053 COMPREHEN METABOLIC PANEL: CPT

## 2023-03-28 PROCEDURE — 3079F DIAST BP 80-89 MM HG: CPT | Performed by: NURSE PRACTITIONER

## 2023-03-28 PROCEDURE — 99214 OFFICE O/P EST MOD 30 MIN: CPT | Performed by: NURSE PRACTITIONER

## 2023-03-28 PROCEDURE — 1126F AMNT PAIN NOTED NONE PRSNT: CPT | Performed by: NURSE PRACTITIONER

## 2023-03-28 PROCEDURE — 99195 PHLEBOTOMY: CPT | Performed by: NURSE PRACTITIONER

## 2023-03-28 PROCEDURE — 71271 CT THORAX LUNG CANCER SCR C-: CPT

## 2023-03-28 PROCEDURE — 3074F SYST BP LT 130 MM HG: CPT | Performed by: NURSE PRACTITIONER

## 2023-03-28 NOTE — PROGRESS NOTES
[facility] HEMATOLOGY & ONCOLOGY  [address]  [facility] [address] [phone]    Patient Name: Iam Gibbs  Encounter Date: 03/28/2023  YOB: 1965  Patient Number: [id number]    Hematology / Oncology Progress Note    HPI / REASON FOR VISIT: Iam Gibbs is a 57 y.o. male who is followed by this office for elevated iron with Hemochromatosis heterozygous with one copy of C282Y AND one copy of S65C.   He is being treated with therapeutic phlebotomy q 2 weeks until his ferritin is <50.  He also has health history significant for HTN, GERD, Anxiety, Gout, erectile dysfunction., Carcot Claire Tooth, Soinal Stenosis, and COPD.       His LFT have been normal although he does drink beer regularly.  Low dose CT of chest on 01/03/22 showed no acute processes in the upper abdomen.  May 26, 2022 CT Abdomen showed 1. Fatty infiltration of the liver. 2. Mild splenomegaly.  3. Atheromatous disease of the aortoiliac vessels. Degenerative changes of the visualized spine.  He was referred to Cardiology and saw Dr. Jean on June 9, 2022.  He will have echo and follow-up at that office later this month.    INTERVAL HISTORY  He presents to clinic today for continued follow-up.  His last phlebotomy was November 29, 2022 for ferritin 87.11.  He has tolerated procedures well without any signs and symptoms of hypovolemia or  Hypotension.    He has no acute complaints today.     He had labs drawn today and results were reviewed with him in office.       LABS    Lab Results - Last 18 Months   Lab Units 03/28/23  0919 01/18/23  0944 11/29/22  0920 08/11/22  1228 07/21/22  1005 07/07/22  1234 07/05/22  0730 06/23/22  1056 06/09/22  1143 05/26/22  0903   HEMOGLOBIN g/dL 16.2 15.8 16.7 15.1 15.4 14.8 15.7 15.4 16.0 15.3   HEMATOCRIT % 49.6 47.8 51.8* 45.9 45.3 43.2 46.5 46.0 47.4 44.6   MCV fL 90.8 89.5 89.5 90.9 91.5 90.9 92.4 92.9 92.0 90.8    WBC 10*3/mm3 7.32 6.06 7.67 5.93 6.28 6.42 6.78 6.44 7.84 5.43   RDW % 13.0 13.2 13.5 12.4 13.2 12.9 12.9 12.8 12.8 12.3   MPV fL 10.3 10.4 9.8 9.9 10.0 10.0  --  10.1 9.7 10.2   PLATELETS 10*3/mm3 241 239 247 259 263 256 265 249 250 217   IMM GRAN % % 0.8* 0.3  --   --   --  0.6*  --  0.6* 0.8* 0.6*   NEUTROS ABS 10*3/mm3 4.33 3.76  --   --   --  4.06 3.84 3.84 5.63 3.28   LYMPHS ABS 10*3/mm3 1.49 1.17  --   --   --  1.31 1.67 1.39 1.10 1.15   MONOS ABS 10*3/mm3 0.99* 0.75  --   --   --  0.67 0.76 0.79 0.79 0.67   EOS ABS 10*3/mm3 0.38 0.28  --   --   --  0.28 0.39 0.29 0.19 0.25   BASOS ABS 10*3/mm3 0.07 0.08  --   --   --  0.06 0.08 0.09 0.07 0.05   IMMATURE GRANS (ABS) 10*3/mm3 0.06* 0.02  --   --   --  0.04  --  0.04 0.06* 0.03   NRBC /100 WBC 0.0 0.0  --   --   --  0.0 0.0 0.0 0.0 0.0       Lab Results - Last 18 Months   Lab Units 03/28/23  0919 11/29/22  0920 07/05/22  0730 05/26/22  0903 04/22/22  1008 01/03/22  0853   GLUCOSE mg/dL 99 96 104* 90 112* 94   SODIUM mmol/L 141 137 142 138 140 138   POTASSIUM mmol/L 3.8 4.1 4.1 3.8 4.1 4.2   TOTAL CO2 mmol/L  --   --  30.2*  --   --  34.0*   CO2 mmol/L 32.0* 31.0*  --  28.0 31.0*  --    CHLORIDE mmol/L 98 98 99 102 101 98   ANION GAP mmol/L 11.0 8.0  --  8.0 8.0  --    CREATININE mg/dL 1.01 0.96 0.92 0.95 0.85 1.00   BUN mg/dL 20 20 14 26* 19 15   BUN / CREAT RATIO  19.8 20.8 15.2 27.4* 22.4 15.0   CALCIUM mg/dL 9.6 9.2 9.6 8.9 9.6 9.7   EGFR IF NONAFRICN AM mL/min/1.73  --   --   --   --   --  78   ALK PHOS U/L 55 54 54 50 47 55   TOTAL PROTEIN g/dL 7.4 7.0  --  6.3 6.6  --    ALT (SGPT) U/L 30 24 24 20 24 32   AST (SGOT) U/L 22 16 19 15 19 20   BILIRUBIN mg/dL 0.3 0.4 0.4 0.4 0.4 0.5   ALBUMIN g/dL 4.7 4.30 4.70 4.50 4.60 5.10   GLOBULIN gm/dL 2.7 2.7  --  1.8 2.0  --        Lab Results - Last 18 Months   Lab Units 07/05/22  0730 01/03/22  0853   URIC ACID mg/dL 7.0 7.2*       Lab Results - Last 18 Months   Lab Units 03/28/23  0919 01/18/23  0944  11/29/22  0920 08/11/22  1228 07/21/22  1005 07/07/22  1234 07/05/22  0730 05/26/22  0903 04/22/22  1008 01/03/22  0853   IRON mcg/dL  --   --   --   --   --   --   --   --  120  --    TIBC mcg/dL  --   --   --   --   --   --   --   --  335 393   IRON SATURATION %  --   --   --   --   --   --   --   --  36 25   FERRITIN ng/mL 87.22 43.89 87.11 86.27 132.20 217.70  --    < > 496.30* 706.00*   TSH uIU/mL  --   --   --   --   --   --  2.020  --   --  1.240   FOLATE ng/mL  --   --   --   --   --   --  9.57  --   --   --     < > = values in this interval not displayed.         PAST MEDICAL HISTORY:  ALLERGIES:  Allergies   Allergen Reactions   • Adhesive Tape Rash     VERY SENSITIVE TO ADHESIVE    • Latex Rash and Unknown - High Severity     CURRENT MEDICATIONS:  Outpatient Encounter Medications as of 3/28/2023   Medication Sig Dispense Refill   • acetaminophen (TYLENOL) 500 MG tablet Take 1 tablet by mouth Every 6 (Six) Hours As Needed for Mild Pain.     • albuterol (PROVENTIL) (2.5 MG/3ML) 0.083% nebulizer solution Take 2.5 mg by nebulization 3 (Three) Times a Day. 90 each 1   • allopurinol (ZYLOPRIM) 100 MG tablet TAKE ONE (1) TABLET BY MOUTH DAILY. 90 tablet 10   • ALPRAZolam (Xanax) 0.5 MG tablet Take 1 tablet by mouth 2 (Two) Times a Day As Needed for Anxiety. (Patient taking differently: Take 1 tablet by mouth As Needed for Anxiety.) 30 tablet 0   • Azelastine HCl 137 MCG/SPRAY solution 2 sprays into the nostril(s) as directed by provider 2 (Two) Times a Day. 60 mL 5   • Budeson-Glycopyrrol-Formoterol (Breztri Aerosphere) 160-9-4.8 MCG/ACT aerosol inhaler Inhale 2 puffs 2 (Two) Times a Day.     • buPROPion XL (WELLBUTRIN XL) 150 MG 24 hr tablet Take 1 tablet by mouth Daily. 90 tablet 1   • busPIRone (BUSPAR) 15 MG tablet TAKE 1/2 (one-half) TABLET BY MOUTH 2 (TWO) TIMES A DAY. 30 tablet 3   • fluticasone (FLONASE) 50 MCG/ACT nasal spray      • gemfibrozil (LOPID) 600 MG tablet TAKE ONE (1) TABLET BY MOUTH DAILY.  NEEDS OFFICE APPT. 90 tablet 4   • hydroCHLOROthiazide (HYDRODIURIL) 25 MG tablet TAKE ONE (1) TABLET BY MOUTH DAILY 90 tablet 10   • ibuprofen (ADVIL,MOTRIN) 600 MG tablet Take 1 tablet by mouth Every 6 (Six) Hours As Needed for Mild Pain.     • loratadine (CLARITIN) 10 MG tablet Take 1 tablet by mouth Daily. 90 tablet 3   • losartan (COZAAR) 25 MG tablet TAKE ONE (1) TABLET BY MOUTH DAILY. 90 tablet 4   • pantoprazole (PROTONIX) 40 MG EC tablet TAKE ONE (1) TABLET BY MOUTH DAILY 90 tablet 3   • tadalafil (Cialis) 20 MG tablet Take 1 tablet by mouth Daily As Needed for Erectile Dysfunction. 30 tablet 1   • Ventolin  (90 Base) MCG/ACT inhaler INHALE 2 PUFFS EVERY 4 (FOUR) HOURS AS NEEDED FOR WHEEZING OR SHORTNESS OF AIR. 18 g 5     No facility-administered encounter medications on file as of 3/28/2023.     ADULT ILLNESSES:  Patient Active Problem List   Diagnosis Code   • Stridor-infrequent R06.1   • Jelani v cord paresis (resser) ([place]) J38.02   • Charcot-Claire-Tooth disease G60.0   • Gastroesophageal reflux disease K21.9   • Former smoker 3.2023-12m Z87.891   • Chronic neck pain M54.2, G89.29   • Spinal stenosis in cervical region M48.02   • Cervical radiculopathy M54.12   • Chronic right shoulder pain M25.511, G89.29   • Wellness examination Z00.00   • Gout M10.9   • History of chronic respiratory failure Z87.09   • Gait difficulty R26.9   • Laboratory test Z01.89   • Hypertriglyceridemia E78.1   • *Hx iron deficiency E61.1   • Hyperuricemia-zyloprim E79.0   • Heme positive stool R19.5   • Obstructive sleep apnea (cpap) G47.33   • Hypertension I10   • High serum ferritin-see hemachromatosis R79.89   • History of COVID-19 Z86.16   • Carrier of hemochromatosis HFE gene mutation (offer referral) Z14.8   • Fatty liver K76.0   • Coronary artery calcification on CT chest  I25.10, I25.84   • Family history of early CAD grandmother  Z82.49   • Other hemochromatosis E83.118   • Elevated fasting glucose R73.01    • Erectile dysfunction N52.9   • COPD (chronic obstructive pulmonary disease) ([facility]) J44.9   • Non-seasonal allergic rhinitis J30.89   • Obesity (BMI 30-39.9) E66.9   • Other specified anxiety disorders F41.8     SURGERIES:  Past Surgical History:   Procedure Laterality Date   • BICEPS TENDONESIS SUBPECTORALIS REPAIR Right 6/28/2019    Procedure: BICEPS TENODESIS - RIGHT;  Surgeon: Yesion Agudelo MD;  Location: [facility] OR;  Service: Orthopedics   • COLONOSCOPY N/A 2/17/2020    Procedure: COLONOSCOPY WITH ANESTHESIA;  Surgeon: Cal Vela DO;  Location: [facility] ENDOSCOPY;  Service: Gastroenterology;  Laterality: N/A;  preop; blood in stool  postop; polyps   PCP anand Jackson    • ENDOSCOPY N/A 2/25/2020    Procedure: ESOPHAGOGASTRODUODENOSCOPY WITH ANESTHESIA;  Surgeon: aCl Vela DO;  Location: [facility] ENDOSCOPY;  Service: Gastroenterology;  Laterality: N/A;  preop; blood in stool  postop; normal   PCP Anand Jackson    • SHOULDER ARTHROSCOPY W/ ROTATOR CUFF REPAIR Right 6/28/2019    Procedure: RIGHT SHOULDER ARTHROSCOPIC ROTATOR CUFF REPAIR, SUBACROMIAL DECOMPRESSION;  Surgeon: Yeison Agudelo MD;  Location: [facility] OR;  Service: Orthopedics     HEALTH MAINTENANCE ITEMS:  Health Maintenance Due   Topic Date Due   • TDAP/TD VACCINES (1 - Tdap) Never done   • ZOSTER VACCINE (1 of 2) Never done   • ANNUAL WELLNESS VISIT  Never done   • COVID-19 Vaccine (3 - Booster for Moderna series) 10/01/2021       <no information>  Last Completed Colonoscopy          COLORECTAL CANCER SCREENING (COLONOSCOPY - Every 5 Years) Next due on 2/17/2025 02/17/2020  Surgical Procedure: COLONOSCOPY    02/17/2020  COLONOSCOPY    01/27/2020  POCT Occult blood x 3, stool              Immunization History   Administered Date(s) Administered   • COVID-19 (MODERNA) 1st, 2nd, 3rd Dose Only 07/06/2021, 08/06/2021     Last Completed Mammogram     This patient has no relevant Health Maintenance data.            FAMILY HISTORY:  Family History    Problem Relation Age of Onset   • Cancer Mother    • Diabetes Mother    • Colon polyps Mother    • Hyperlipidemia Father    • Hypertension Father    • Cancer Father    • Colon cancer Neg Hx      SOCIAL HISTORY:  Social History     Socioeconomic History   • Marital status: Single   • Years of education: 10   • Highest education level: GED or equivalent   Tobacco Use   • Smoking status: Former     Packs/day: 1.00     Years: 32.00     Pack years: 32.00     Types: Cigars, Cigarettes     Start date:      Quit date: 10/2015     Years since quittin.4   • Smokeless tobacco: Former     Quit date:    Vaping Use   • Vaping Use: Never used   Substance and Sexual Activity   • Alcohol use: Yes     Alcohol/week: 3.0 standard drinks     Types: 3 Cans of beer per week     Comment: weekly   • Drug use: Yes     Types: Marijuana   • Sexual activity: Yes     Partners: Female       REVIEW OF SYSTEMS:  Review of Systems   Constitutional: Positive for fatigue. Negative for activity change, appetite change, fever, unexpected weight gain and unexpected weight loss.   HENT: Negative for dental problem, facial swelling, swollen glands and trouble swallowing.    Eyes: Negative for double vision and discharge.   Respiratory: Positive for shortness of breath. Negative for cough and wheezing.    Cardiovascular: Negative for chest pain, palpitations and leg swelling.   Gastrointestinal: Negative for abdominal pain, blood in stool, nausea and vomiting.   Endocrine: Negative.    Genitourinary: Negative for dysuria and hematuria.   Musculoskeletal: Positive for gait problem. Negative for arthralgias and myalgias.   Skin: Negative for rash, skin lesions and wound.   Allergic/Immunologic: Negative for immunocompromised state.   Neurological: Negative for speech difficulty, light-headedness, headache, memory problem and confusion.   Hematological: Negative for adenopathy.   Psychiatric/Behavioral: Negative for self-injury, suicidal ideas  "and depressed mood. The patient is not nervous/anxious.          /82   Pulse 86   Temp 97 °F (36.1 °C)   Resp 16   Ht 167.6 cm (66\")   Wt 87.9 kg (193 lb 12.8 oz)   SpO2 93%   BMI 31.28 kg/m²  Body surface area is 1.97 meters squared.      Physical Exam:  Physical Exam  Constitutional:       Appearance: Normal appearance.   HENT:      Head: Normocephalic and atraumatic.   Eyes:      Extraocular Movements: Extraocular movements intact.   Cardiovascular:      Rate and Rhythm: Normal rate and regular rhythm.   Pulmonary:      Effort: Pulmonary effort is normal.      Breath sounds: Normal breath sounds.   Abdominal:      General: Abdomen is flat.      Palpations: Abdomen is soft.   Musculoskeletal:      Comments: Abnormal gait r/t CMT.  Uses cane for ambulation  Contracted hands     Skin:     General: Skin is warm and dry.   Neurological:      General: No focal deficit present.      Mental Status: He is alert and oriented to person, place, and time.      Deep Tendon Reflexes: Reflexes normal.         Iam Gibbs reports a pain score of 0. No intervention indicated.        ASSESSMENT / PLAN    1. 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 Hypertension, unspecified type    3. Charcot-Claire-Tooth disease            ASSESSMENT:   1.  Hemochromatosis   -Pt has compound Heterozygous Hemochromotosis one copy of C282Y and one copy of S65C.  -He also has Charcot-Claire Tooth Disease.  -Liver Function Tests have been normal.  -Ferritin 87.3  Hgb 16.2, Hct 49.6.  -Will continue with phlebotomy today for 500 cc whole blood.       2. Hypertension  -BP stable today  122/82  -Pt is taking HCTZ, and Losartan  -Managed by PCP    3.  Charcot-Claire-Tooth Disease  -Abnormal gait, contracted hands  -Managed by neurology     PLAN:  -We will perform therapeutic phlebotomy today.    -500 cc whole blood was removed.  Pt tolerated phlebotomy well w/o s/s of hypovolemia or " hypotension.   -We will continue to perform phlebotomy every 3 months for ferritin > 50.    - He will RTC for visit with provider in 6 months. Pre office labs for CBC, CMP, Ferritin   -He will continue all medications and treatment plans per PCP and other providers.   -Care discussed with patient.  Understanding expressed.  Patient agreeable with plan.    PHLEBOTOMY TODAY        Nat Paez, APRN  03/28/2023

## 2023-04-04 ENCOUNTER — TELEPHONE (OUTPATIENT)
Dept: FAMILY MEDICINE CLINIC | Facility: CLINIC | Age: 58
End: 2023-04-04
Payer: MEDICARE

## 2023-04-04 NOTE — TELEPHONE ENCOUNTER
----- Message from Tracey Henry MA sent at 4/4/2023  1:15 PM CDT -----  Regarding: FW: Abscess on inner left gum at back tooth   Contact: [phone]    ----- Message -----  From: Iam Gibbs  Sent: 4/4/2023  11:07 AM CDT  To: Magi Espinoza [facility]  Subject: Abscess on inner left gum at back tooth          I have an abscess on my gum that popped up a couple days ago and is sometimes painful!! My tooth itself doesn’t bother me. Was wondering if you could give me somekind of antibiotics to get rid of this thing?? Thanks much for all you do!!

## 2023-04-06 DIAGNOSIS — F32.9 REACTIVE DEPRESSION: ICD-10-CM

## 2023-04-06 RX ORDER — BUPROPION HYDROCHLORIDE 150 MG/1
TABLET ORAL
Qty: 90 TABLET | Refills: 10 | Status: SHIPPED | OUTPATIENT
Start: 2023-04-06

## 2023-04-06 RX ORDER — BUDESONIDE, GLYCOPYRROLATE, AND FORMOTEROL FUMARATE 160; 9; 4.8 UG/1; UG/1; UG/1
2 AEROSOL, METERED RESPIRATORY (INHALATION) 2 TIMES DAILY
Qty: 10.7 G | Refills: 10 | Status: SHIPPED | OUTPATIENT
Start: 2023-04-06

## 2023-04-07 ENCOUNTER — OFFICE VISIT (OUTPATIENT)
Dept: FAMILY MEDICINE CLINIC | Facility: CLINIC | Age: 58
End: 2023-04-07
Payer: MEDICARE

## 2023-04-07 VITALS
SYSTOLIC BLOOD PRESSURE: 156 MMHG | HEIGHT: 66 IN | RESPIRATION RATE: 14 BRPM | HEART RATE: 93 BPM | WEIGHT: 192 LBS | DIASTOLIC BLOOD PRESSURE: 92 MMHG | BODY MASS INDEX: 30.86 KG/M2 | OXYGEN SATURATION: 95 %

## 2023-04-07 DIAGNOSIS — K12.0 CANKER SORE: Primary | ICD-10-CM

## 2023-04-07 PROCEDURE — 1160F RVW MEDS BY RX/DR IN RCRD: CPT | Performed by: NURSE PRACTITIONER

## 2023-04-07 PROCEDURE — 1159F MED LIST DOCD IN RCRD: CPT | Performed by: NURSE PRACTITIONER

## 2023-04-07 PROCEDURE — 99213 OFFICE O/P EST LOW 20 MIN: CPT | Performed by: NURSE PRACTITIONER

## 2023-04-07 PROCEDURE — 3077F SYST BP >= 140 MM HG: CPT | Performed by: NURSE PRACTITIONER

## 2023-04-07 PROCEDURE — 3080F DIAST BP >= 90 MM HG: CPT | Performed by: NURSE PRACTITIONER

## 2023-04-07 RX ORDER — VALACYCLOVIR HYDROCHLORIDE 1 G/1
1000 TABLET, FILM COATED ORAL 3 TIMES DAILY
Qty: 9 TABLET | Refills: 0 | Status: SHIPPED | OUTPATIENT
Start: 2023-04-07

## 2023-04-07 NOTE — PROGRESS NOTES
Subjective   Chief Complaint:  Sore on gum.    History of Present Illness:  This 58 y.o. male was seen in the office today.      The patient reports 1 week into a sore on his left lower gum on the inside. He notes it is quite painful at times.    Allergies   Allergen Reactions   • Adhesive Tape Rash     VERY SENSITIVE TO ADHESIVE    • Latex Rash and Unknown - High Severity      Current Outpatient Medications on File Prior to Visit   Medication Sig   • acetaminophen (TYLENOL) 500 MG tablet Take 1 tablet by mouth Every 6 (Six) Hours As Needed for Mild Pain.   • albuterol (PROVENTIL) (2.5 MG/3ML) 0.083% nebulizer solution Take 2.5 mg by nebulization 3 (Three) Times a Day.   • allopurinol (ZYLOPRIM) 100 MG tablet TAKE ONE (1) TABLET BY MOUTH DAILY.   • ALPRAZolam (Xanax) 0.5 MG tablet Take 1 tablet by mouth 2 (Two) Times a Day As Needed for Anxiety. (Patient taking differently: Take 1 tablet by mouth As Needed for Anxiety.)   • Azelastine HCl 137 MCG/SPRAY solution 2 sprays into the nostril(s) as directed by provider 2 (Two) Times a Day.   • Breztri Aerosphere 160-9-4.8 MCG/ACT aerosol inhaler INHALE 2 PUFFS 2 (TWO) TIMES A DAY.   • buPROPion XL (WELLBUTRIN XL) 150 MG 24 hr tablet TAKE ONE (1) TABLET BY MOUTH DAILY.   • busPIRone (BUSPAR) 15 MG tablet TAKE 1/2 (one-half) TABLET BY MOUTH 2 (TWO) TIMES A DAY.   • fluticasone (FLONASE) 50 MCG/ACT nasal spray    • gemfibrozil (LOPID) 600 MG tablet TAKE ONE (1) TABLET BY MOUTH DAILY. NEEDS OFFICE APPT.   • hydroCHLOROthiazide (HYDRODIURIL) 25 MG tablet TAKE ONE (1) TABLET BY MOUTH DAILY   • ibuprofen (ADVIL,MOTRIN) 600 MG tablet Take 1 tablet by mouth Every 6 (Six) Hours As Needed for Mild Pain.   • loratadine (CLARITIN) 10 MG tablet Take 1 tablet by mouth Daily.   • losartan (COZAAR) 25 MG tablet TAKE ONE (1) TABLET BY MOUTH DAILY.   • pantoprazole (PROTONIX) 40 MG EC tablet TAKE ONE (1) TABLET BY MOUTH DAILY   • tadalafil (Cialis) 20 MG tablet Take 1 tablet by mouth Daily  As Needed for Erectile Dysfunction.   • Ventolin  (90 Base) MCG/ACT inhaler INHALE 2 PUFFS EVERY 4 (FOUR) HOURS AS NEEDED FOR WHEEZING OR SHORTNESS OF AIR.     No current facility-administered medications on file prior to visit.      Past Medical, Surgical, Social, and Family History:  Past Medical History:   Diagnosis Date   • Anemia    • Arthritis    • Back pain    • Charcot-Claire-Tooth disease     DX AT 19    • Claustrophobia    • COPD (chronic obstructive pulmonary disease)    • Elevated cholesterol    • Gait abnormality     DUE TO CMT    • GERD (gastroesophageal reflux disease)    • Hyperlipidemia    • Hypertension    • Irritable bowel    • Other diseases of vocal cords     NARROW VOICE BOX    • Sleep apnea     DOES NOT USE BIPAP OR CPAP      Past Surgical History:   Procedure Laterality Date   • BICEPS TENDONESIS SUBPECTORALIS REPAIR Right 6/28/2019    Procedure: BICEPS TENODESIS - RIGHT;  Surgeon: Yeison Agudelo MD;  Location: [facility] OR;  Service: Orthopedics   • COLONOSCOPY N/A 2/17/2020    Procedure: COLONOSCOPY WITH ANESTHESIA;  Surgeon: Cal Vela DO;  Location: [facility] ENDOSCOPY;  Service: Gastroenterology;  Laterality: N/A;  preop; blood in stool  postop; polyps   PCP anand Jackson    • ENDOSCOPY N/A 2/25/2020    Procedure: ESOPHAGOGASTRODUODENOSCOPY WITH ANESTHESIA;  Surgeon: Cal Vela DO;  Location: [facility] ENDOSCOPY;  Service: Gastroenterology;  Laterality: N/A;  preop; blood in stool  postop; normal   PCP Anand Jackson    • SHOULDER ARTHROSCOPY W/ ROTATOR CUFF REPAIR Right 6/28/2019    Procedure: RIGHT SHOULDER ARTHROSCOPIC ROTATOR CUFF REPAIR, SUBACROMIAL DECOMPRESSION;  Surgeon: Yeison Agudelo MD;  Location: [facility] OR;  Service: Orthopedics     Social History     Socioeconomic History   • Marital status: Single   • Years of education: 10   • Highest education level: GED or equivalent   Tobacco Use   • Smoking status: Former     Packs/day: 1.00     Years: 32.00     Pack years: 32.00  "    Types: Cigars, Cigarettes     Start date:      Quit date: 10/2015     Years since quittin.5   • Smokeless tobacco: Former     Quit date:    Vaping Use   • Vaping Use: Never used   Substance and Sexual Activity   • Alcohol use: Yes     Alcohol/week: 3.0 standard drinks     Types: 3 Cans of beer per week     Comment: weekly   • Drug use: Yes     Types: Marijuana   • Sexual activity: Yes     Partners: Female     Family History   Problem Relation Age of Onset   • Cancer Mother    • Diabetes Mother    • Colon polyps Mother    • Hyperlipidemia Father    • Hypertension Father    • Cancer Father    • Colon cancer Neg Hx        Objective   Physical Exam  Constitutional:       General: He is not in acute distress.  HENT:      Mouth/Throat:      Comments: There is a 2 mm round white ulcer on his left inside lower gum.  Pulmonary:      Effort: Pulmonary effort is normal. No respiratory distress.   Neurological:      Mental Status: He is alert.     /92   Pulse 93   Resp 14   Ht 167.6 cm (66\")   Wt 87.1 kg (192 lb)   SpO2 95%   BMI 30.99 kg/m²     Prior Visit Notes/Records, Lab, Imaging, and Diagnostic Results Reviewed:  CBC:  Lab Results - Last 18 Months   Lab Units 23  0919 23  0944 22  0920 22  1228 22  1005 22  1234 22  0730 22  0903 22  1008 22  0853   WBC 10*3/mm3 7.32 6.06 7.67 5.93 6.28 6.42 6.78   < > 7.56 7.03   HEMOGLOBIN g/dL 16.2 15.8 16.7 15.1 15.4 14.8 15.7   < > 16.4 17.3   HEMATOCRIT % 49.6 47.8 51.8* 45.9 45.3 43.2 46.5   < > 48.1 51.4*   PLATELETS 10*3/mm3 241 239 247 259 263 256 265   < > 229 251   IRON mcg/dL  --   --   --   --   --   --  71  --  120 99    < > = values in this interval not displayed.      Chemistry:  Lab Results - Last 18 Months   Lab Units 23  0919 22  0920 22  0730 22  0903 22  1008 22  0853   SODIUM mmol/L 141 137 142 138 140 138   POTASSIUM mmol/L 3.8 4.1 4.1 3.8 4.1 " 4.2   CHLORIDE mmol/L 98 98 99 102 101 98   TOTAL CO2 mmol/L  --   --  30.2*  --   --  34.0*   CO2 mmol/L 32.0* 31.0*  --  28.0 31.0*  --    GLUCOSE mg/dL 99 96 104* 90 112* 94   BUN mg/dL 20 20 14 26* 19 15   CREATININE mg/dL 1.01 0.96 0.92 0.95 0.85 1.00   EGFR IF NONAFRICN AM mL/min/1.73  --   --   --   --   --  78   EGFR IF AFRICN AM mL/min/1.73  --   --   --   --   --  94   EGFR RESULT mL/min/1.73  --   --  98.2  --   --   --    CALCIUM mg/dL 9.6 9.2 9.6 8.9 9.6 9.7     Lab Results - Last 18 Months   Lab Units 03/28/23  0919 11/29/22  0920 07/05/22  0730 05/26/22  0903 04/22/22  1008 01/03/22  0853   ALT (SGPT) U/L 30 24 24 20 24 32   AST (SGOT) U/L 22 16 19 15 19 20   ALK PHOS U/L 55 54 54 50 47 55     Assessment & Plan   Diagnoses and all orders for this visit:    1. Canker sore (Primary)    Other orders  -     valACYclovir (Valtrex) 1000 MG tablet; Take 1 tablet by mouth 3 (Three) Times a Day.  Dispense: 9 tablet; Refill: 0    Discussion:  Advised and educated plan of care. Advised zinc, vitamin C, and D with an acute onset can show some benefit, but he is a week into it now. Advised proceed with 3-day Valtrex. Offered some Magic mouthwash to allow him to be able to eat better, he declined. Alternatively, I advised over the counter Orajel [place] as needed to cover. Otherwise, should be self limiting and resolve on his own. Advised to follow up as needed. I did advise him that his blood pressure is elevated today, but he advises he has not had his blood pressure medication today. He is going to self monitor and on a day that he has his blood pressure medication and let me know if persistently elevated.    Follow-up:  Return for follow-up as needed.    Transcribed from ambient dictation for RUEL Lyman by Edgar Figueredo.  04/07/23   12:08 CDT    I have personally performed the services described in this document as transcribed by the above individual, and it is both accurate and  complete.    Electronically signed by RUEL Lyman, 04/07/23, 12:08 PM CDT.

## 2023-04-26 ENCOUNTER — OFFICE VISIT (OUTPATIENT)
Dept: PULMONOLOGY | Facility: CLINIC | Age: 58
End: 2023-04-26
Payer: MEDICARE

## 2023-04-26 VITALS
WEIGHT: 189 LBS | HEART RATE: 94 BPM | SYSTOLIC BLOOD PRESSURE: 130 MMHG | HEIGHT: 66 IN | DIASTOLIC BLOOD PRESSURE: 86 MMHG | OXYGEN SATURATION: 95 % | BODY MASS INDEX: 30.37 KG/M2

## 2023-04-26 DIAGNOSIS — G47.33 OSA (OBSTRUCTIVE SLEEP APNEA): ICD-10-CM

## 2023-04-26 DIAGNOSIS — F41.9 ANXIETY: ICD-10-CM

## 2023-04-26 DIAGNOSIS — E66.9 OBESITY (BMI 30-39.9): ICD-10-CM

## 2023-04-26 DIAGNOSIS — Z86.16 HISTORY OF COVID-19: ICD-10-CM

## 2023-04-26 DIAGNOSIS — J30.89 NON-SEASONAL ALLERGIC RHINITIS, UNSPECIFIED TRIGGER: ICD-10-CM

## 2023-04-26 DIAGNOSIS — Z87.891 FORMER SMOKER: ICD-10-CM

## 2023-04-26 DIAGNOSIS — E78.1 HYPERTRIGLYCERIDEMIA: ICD-10-CM

## 2023-04-26 DIAGNOSIS — J44.9 CHRONIC OBSTRUCTIVE PULMONARY DISEASE, UNSPECIFIED COPD TYPE: Primary | ICD-10-CM

## 2023-04-26 DIAGNOSIS — G60.0 CHARCOT-MARIE-TOOTH DISEASE: ICD-10-CM

## 2023-04-26 RX ORDER — BUSPIRONE HYDROCHLORIDE 15 MG/1
TABLET ORAL
Qty: 30 TABLET | Refills: 3 | Status: SHIPPED | OUTPATIENT
Start: 2023-04-26

## 2023-04-26 NOTE — PROGRESS NOTES
RESPIRATORY DISEASE CLINIC OUTPATIENT PROGRESS NOTE    Patient: Iam Gibbs  : 1965  Age: 58 y.o.  Date of Service: 2023    REASON FOR CLINIC VISIT:  Chief Complaint   Patient presents with   • COPD     Pt here for follow up   • Shortness of Breath     Pt has been experiencing sob when walking short distances       Subjective:    History of Present Illness:  Iam Gibbs is a 58 y.o. male who presents to the office today to be seen for    Diagnosis Plan   1. Chronic obstructive pulmonary disease, unspecified COPD type        2. DARRIN (obstructive sleep apnea)        3. Former smoker 3.        4. History of COVID-19        5. Charcot-Claire-Tooth disease        6. Non-seasonal allergic rhinitis, unspecified trigger        .  Other problems per record.  Patient is a very pleasant middle aged  gentleman was seen in the pulmonary clinic for a follow-up visit.    Charcot-Claire-Tooth disease with neurologic problem and he walks with a cane and has some muscle wasting.  He is a former smoker and quit smoking a few months ago.  He has chronic obstructive pulmonary disease and he is currently using Breztri inhaler with albuterol nebulizer and rescue inhaler.  He also had a sleep study done which showed significant obstructive sleep apnea but this is a home sleep study which showed his apnea hypopnea index is more than 23 and had significant oxygen desaturation down to 68% with prolonged.  Some oxygen desaturation.  Patient was set up for a home auto titrating CPAP but he is unable to tolerate it and used only 2 or 3 nights but he thinks it is too much pressure and he gets panic attacks and wakes up in the night and takes the mask off.  He is not on any oxygen at night.  I discussed this issue with the respiratory therapist in the clinic Cathy.  The patient is actually in the Medicare insurance and will not qualify for nocturnal oxygen until I see a in lab sleep study and  CPAP titration is done.  Patient is not willing to do it and wanted to try oral titrating CPAP again at home.  Due to his severe oxygen desaturation he will need to work on this.    Otherwise he is doing well from the pulmonary standpoint.  He is also known to have COVID infection in the past although he was vaccinated for COVID.  He had allergic rhinosinusitis and uses Astelin nasal spray fluticasone nasal spray and loratadine.  He had no recent hospitalizations and ER visit and urgent care visit but had some anxiety issues.  Patient told me he lives alone.  He had a recent CT scan of the chest done in 2022 which showed some pulmonary scarring but no lung nodules.      PFT done today:  Not done today      No results found for this or any previous visit.         Bronchodilator therapy: Breztri inhaler, albuterol nebulizer and rescue inhaler.    Smoking Status:   Social History     Tobacco Use   Smoking Status Former   • Packs/day: 1.00   • Years: 32.00   • Pack years: 32.00   • Types: Cigars, Cigarettes   • Start date:    • Quit date: 10/2015   • Years since quittin.5   Smokeless Tobacco Former   • Quit date:      Pulm Rehab: no  Sleep: yes    Support System: lives alone    Code Status:   There are no questions and answers to display.        Review of Systems:  A complete review of systems is performed and all other systems were reviewed and negative as note above in the HPI.  Review of Systems   Constitutional: Positive for fatigue.   HENT: Positive for congestion, postnasal drip and sinus pressure.    Eyes: Negative.    Respiratory: Positive for chest tightness and shortness of breath.    Cardiovascular: Negative.    Gastrointestinal: Negative.    Endocrine: Negative.    Genitourinary: Negative.    Musculoskeletal: Positive for arthralgias, back pain and myalgias.   Skin: Negative.    Allergic/Immunologic: Positive for environmental allergies.   Neurological: Positive for weakness.    Hematological: Negative.    Psychiatric/Behavioral: The patient is nervous/anxious.        CAT/ACT Score:  Not done today    Medications:  Outpatient Encounter Medications as of 4/26/2023   Medication Sig Dispense Refill   • acetaminophen (TYLENOL) 500 MG tablet Take 1 tablet by mouth Every 6 (Six) Hours As Needed for Mild Pain.     • albuterol (PROVENTIL) (2.5 MG/3ML) 0.083% nebulizer solution Take 2.5 mg by nebulization 3 (Three) Times a Day. 90 each 1   • allopurinol (ZYLOPRIM) 100 MG tablet TAKE ONE (1) TABLET BY MOUTH DAILY. 90 tablet 10   • ALPRAZolam (Xanax) 0.5 MG tablet Take 1 tablet by mouth 2 (Two) Times a Day As Needed for Anxiety. (Patient taking differently: Take 1 tablet by mouth As Needed for Anxiety.) 30 tablet 0   • Azelastine HCl 137 MCG/SPRAY solution 2 sprays into the nostril(s) as directed by provider 2 (Two) Times a Day. 60 mL 5   • Breztri Aerosphere 160-9-4.8 MCG/ACT aerosol inhaler INHALE 2 PUFFS 2 (TWO) TIMES A DAY. 10.7 g 10   • buPROPion XL (WELLBUTRIN XL) 150 MG 24 hr tablet TAKE ONE (1) TABLET BY MOUTH DAILY. 90 tablet 10   • busPIRone (BUSPAR) 15 MG tablet TAKE 1/2 (one-half) TABLET BY MOUTH 2 (TWO) TIMES A DAY. 30 tablet 3   • fluticasone (FLONASE) 50 MCG/ACT nasal spray      • gemfibrozil (LOPID) 600 MG tablet TAKE ONE (1) TABLET BY MOUTH DAILY. NEEDS OFFICE APPT. 90 tablet 4   • hydroCHLOROthiazide (HYDRODIURIL) 25 MG tablet TAKE ONE (1) TABLET BY MOUTH DAILY 90 tablet 10   • ibuprofen (ADVIL,MOTRIN) 600 MG tablet Take 1 tablet by mouth Every 6 (Six) Hours As Needed for Mild Pain.     • losartan (COZAAR) 25 MG tablet TAKE ONE (1) TABLET BY MOUTH DAILY. 90 tablet 4   • pantoprazole (PROTONIX) 40 MG EC tablet TAKE ONE (1) TABLET BY MOUTH DAILY 90 tablet 3   • tadalafil (Cialis) 20 MG tablet Take 1 tablet by mouth Daily As Needed for Erectile Dysfunction. 30 tablet 1   • Ventolin  (90 Base) MCG/ACT inhaler INHALE 2 PUFFS EVERY 4 (FOUR) HOURS AS NEEDED FOR WHEEZING OR  "SHORTNESS OF AIR. 18 g 5   • [DISCONTINUED] valACYclovir (Valtrex) 1000 MG tablet Take 1 tablet by mouth 3 (Three) Times a Day. 9 tablet 0   • [DISCONTINUED] loratadine (CLARITIN) 10 MG tablet Take 1 tablet by mouth Daily. (Patient not taking: Reported on 4/26/2023) 90 tablet 3     No facility-administered encounter medications on file as of 4/26/2023.       Allergies:  Allergies   Allergen Reactions   • Adhesive Tape Rash     VERY SENSITIVE TO ADHESIVE    • Latex Rash and Unknown - High Severity       Immunizations:  Immunization History   Administered Date(s) Administered   • COVID-19 (MODERNA) 1st,2nd,3rd Dose Monovalent 07/06/2021, 08/06/2021       Objective:    Vitals:  /86 (BP Location: Left arm, Patient Position: Sitting, Cuff Size: Adult)   Pulse 94   Ht 167.6 cm (66\")   Wt 85.7 kg (189 lb)   SpO2 95%   BMI 30.51 kg/m²     Physical Exam:  General: Patient is a 58 y.o. pleasant middle aged  male. Looks stated age. Appears to be in no acute distress.  Eyes: EOMI. PERRLA. Vision intact. No scleral icterus.  Ear, Nose, Mouth and Throat: Hearing is grossly intact. No Leukoplakia, pharyngitis, stomatitis or thrush. Swollen nasal mucosa with post nasal drop.  Neck: Range of motion of neck normal. No thyromegaly or masses. Mallampati Class 3  Respiratory: Clear to auscultation bilaterally. No use of accessory muscles. Decreased breath sounds.  Cardiovascular: Normal heart sounds. Regularly regular rhythm without murmur.  Gastrointestinal: Non tender, non distended, soft. Bowel sounds positive in all four quadrants. No organomegaly.  Skin: No obvious rashes, lesions, ulcers or large amount of bruising. No edema.   Neurological: No new motor deficits. Cranial nerves appear intact.Walks with a cane . Muscle wasting in hands.  Psychiatric: Patient is alert and oriented to person, place and time.    Chest Imaging:    Study Result on on 3/28/2023  Narrative & Impression   CT CHEST LOW DOSE CANCER " SCREENING WO- 3/28/2023 9:48 AM CDT     HISTORY: annual lung ca screening; Z87.891-Personal history of nicotine  dependence; F17.200-Nicotine dependence, unspecified, uncomplicated;  Z72.0-Tobacco use      COMPARISON: 01/19/2022, 11/18/2016     DOSE LENGTH PRODUCT: 52 mGy cm. Automated exposure control was also  utilized to decrease patient radiation dose.     TECHNIQUE: Axial images the chest are performed following IV contrast  according to low-dose protocol     FINDINGS:  No pathologic intrathoracic or axillary lymphadenopathy.  Heart remains normal in size. No thoracic aortic aneurysm. No  pericardial or pleural effusion. Minimal right greater than left  gynecomastia.     Images the upper abdomen degraded by the low-dose protocol. Adrenal  glands are not imaged in their entirety. Hepatic steatosis is  considered.     Linear scarring left lower lobe. Mild dependent basilar atelectasis. No  new or suspicious pulmonary nodules. No consolidation. No pneumothorax.  No discrete endobronchial lesion.     Moderate degenerative change mid to lower thoracic spine. No aggressive  regional bony lesions.     IMPRESSION:  1. No suspicious pulmonary nodule. Stable linear left lower lobe  scarring.  2. Lung RADS category 1-negative exam. Continued annual screening with  low-dose CT chest in 12 months recommended.    This report was finalized on 03/28/2023 10:07 by Dr. Michelle Madrid MD.       Assessment:  1. Chronic obstructive pulmonary disease, unspecified COPD type    2. DARRIN (obstructive sleep apnea)    3. Former smoker 3.2023-12m    4. History of COVID-19    5. Charcot-Claire-Tooth disease    6. Non-seasonal allergic rhinitis, unspecified trigger      Plan/Recommendations:    1.  I reviewed the last home sleep study and discussed the importance of using the CPAP.  He has severe nocturnal hypoxemia with oxygen saturation in the high apnea-hypopnea index testing severe sleep apnea.  Patient is not very excited to do a in lab  CPAP titration but he was given necessary advice for CPAP usage and he will try to use ordering CPAP again.  2.  If he is unable to do so and tolerate auto titrating CPAP I will start the patient on nocturnal oxygen at least after his in lab CPAP titration.  Patient is agreeable with the current plan.  3.  Patient will continue Breztri albuterol nebulizer and rescue inhaler as before for underlying COPD he is a former smoker and did quit smoking.  No medication refill was needed.  He is also using precaution as per routine for nasal allergy which will be continued.  4.  Patient is already vaccinated for COVID but will need a booster dose and he will also need annual vaccination for pneumonia and influenza.  He is getting low-dose CT screening of the chest annually which is ordered by primary care provider.  He will return to pulmonary clinic for follow-up visit in 3 months time to check on her CPAP compliance.  He can return to the pulmonary clinic earlier if needed.    Follow up:  3 Months    Time Spent:  30 minutes    I appreciate the opportunity of participating in this patient's care. I would like to thank the PCP for the referral.  Please feel free to contact me with any other questions.    Shanice Hebert MD   Pulmonologist/Intensivist     Electronically signed by: Shanice Hebert MD, 4/26/2023 16:18 CDT

## 2023-06-09 DIAGNOSIS — J38.02 BILATERAL VOCAL CORD PARESIS: ICD-10-CM

## 2023-06-09 DIAGNOSIS — K21.9 GASTROESOPHAGEAL REFLUX DISEASE: ICD-10-CM

## 2023-06-09 DIAGNOSIS — R06.1 STRIDOR: ICD-10-CM

## 2023-06-09 RX ORDER — PANTOPRAZOLE SODIUM 40 MG/1
TABLET, DELAYED RELEASE ORAL
Qty: 90 TABLET | Refills: 3 | Status: SHIPPED | OUTPATIENT
Start: 2023-06-09

## 2023-08-07 ENCOUNTER — TELEPHONE (OUTPATIENT)
Dept: PULMONOLOGY | Facility: CLINIC | Age: 58
End: 2023-08-07
Payer: MEDICARE

## 2023-08-07 NOTE — TELEPHONE ENCOUNTER
Called patient to reschedule their no show appointment that was originally scheduled on 07/24/2023 .      Unable to contact patient after trying all available phone numbers. No Show letter was mailed, which includes [employer] No Show Policy.

## 2023-09-01 ENCOUNTER — OFFICE VISIT (OUTPATIENT)
Dept: PULMONOLOGY | Facility: CLINIC | Age: 58
End: 2023-09-01
Payer: MEDICARE

## 2023-09-01 VITALS
SYSTOLIC BLOOD PRESSURE: 124 MMHG | HEART RATE: 90 BPM | HEIGHT: 66 IN | OXYGEN SATURATION: 97 % | WEIGHT: 188.2 LBS | BODY MASS INDEX: 30.25 KG/M2 | DIASTOLIC BLOOD PRESSURE: 78 MMHG

## 2023-09-01 DIAGNOSIS — Z23 NEED FOR PNEUMOCOCCAL VACCINATION: ICD-10-CM

## 2023-09-01 DIAGNOSIS — Z86.16 HISTORY OF COVID-19: ICD-10-CM

## 2023-09-01 DIAGNOSIS — Z23 NEED FOR STREPTOCOCCUS PNEUMONIAE AND INFLUENZA VACCINATION: ICD-10-CM

## 2023-09-01 DIAGNOSIS — J44.9 CHRONIC OBSTRUCTIVE PULMONARY DISEASE, UNSPECIFIED COPD TYPE: Primary | ICD-10-CM

## 2023-09-01 DIAGNOSIS — J30.89 NON-SEASONAL ALLERGIC RHINITIS, UNSPECIFIED TRIGGER: ICD-10-CM

## 2023-09-01 DIAGNOSIS — Z87.09 HISTORY OF CHRONIC RESPIRATORY FAILURE: ICD-10-CM

## 2023-09-01 DIAGNOSIS — E66.9 OBESITY (BMI 30-39.9): ICD-10-CM

## 2023-09-01 DIAGNOSIS — Z87.891 PERSONAL HISTORY OF NICOTINE DEPENDENCE: ICD-10-CM

## 2023-09-01 DIAGNOSIS — G47.33 OSA (OBSTRUCTIVE SLEEP APNEA): ICD-10-CM

## 2023-09-01 DIAGNOSIS — J40 BRONCHITIS: ICD-10-CM

## 2023-09-01 RX ORDER — AZELASTINE HYDROCHLORIDE 137 UG/1
2 SPRAY, METERED NASAL 2 TIMES DAILY
Qty: 60 ML | Refills: 5 | Status: SHIPPED | OUTPATIENT
Start: 2023-09-01

## 2023-09-01 RX ORDER — LORATADINE 10 MG/1
10 TABLET ORAL DAILY
Qty: 90 TABLET | Refills: 3 | Status: SHIPPED | OUTPATIENT
Start: 2023-09-01

## 2023-09-01 RX ORDER — ALBUTEROL SULFATE 90 UG/1
2 AEROSOL, METERED RESPIRATORY (INHALATION) EVERY 4 HOURS PRN
Qty: 18 G | Refills: 5 | Status: SHIPPED | OUTPATIENT
Start: 2023-09-01

## 2023-09-01 RX ORDER — ALBUTEROL SULFATE 2.5 MG/3ML
2.5 SOLUTION RESPIRATORY (INHALATION) 3 TIMES DAILY
Qty: 90 EACH | Refills: 1 | Status: SHIPPED | OUTPATIENT
Start: 2023-09-01

## 2023-09-01 RX ORDER — BUDESONIDE, GLYCOPYRROLATE, AND FORMOTEROL FUMARATE 160; 9; 4.8 UG/1; UG/1; UG/1
2 AEROSOL, METERED RESPIRATORY (INHALATION) 2 TIMES DAILY
Qty: 10.7 G | Refills: 10 | Status: SHIPPED | OUTPATIENT
Start: 2023-09-01

## 2023-09-01 RX ORDER — FLUTICASONE PROPIONATE 50 MCG
2 SPRAY, SUSPENSION (ML) NASAL DAILY
Qty: 16 G | Refills: 5 | Status: SHIPPED | OUTPATIENT
Start: 2023-09-01

## 2023-09-01 NOTE — PROGRESS NOTES
RESPIRATORY DISEASE CLINIC OUTPATIENT PROGRESS NOTE    Patient: Iam Gibbs  : 1965  Age: 58 y.o.  Date of Service: 2023    REASON FOR CLINIC VISIT:  Chief Complaint   Patient presents with    COPD       Subjective:    History of Present Illness:  Iam Gibbs is a 58 y.o. male who presents to the office today to be seen for    Diagnosis Plan   1. Chronic obstructive pulmonary disease, unspecified COPD type        2. DARRIN (obstructive sleep apnea)        3. History of COVID-19        4. History of chronic respiratory failure  Overnight Sleep Oximetry Study      5. Non-seasonal allergic rhinitis, unspecified trigger        6. Personal history of nicotine dependence        7. Bronchitis  albuterol (PROVENTIL) (2.5 MG/3ML) 0.083% nebulizer solution    albuterol sulfate HFA (Ventolin HFA) 108 (90 Base) MCG/ACT inhaler      8. Obesity (BMI 30-39.9)        .  Other problems per record.    History:    Patient is a very pleasant middle-aged  gentleman who was seen in the pulmonary clinic for a follow-up visit.    And is a former smoker and quit smoking about 10 years ago but he was smoking cigars on and off but did not smoke much in the last 8 years.  He had chronic obstructive pulmonary disease diagnosed by the PFT in the past and he is currently using Breztri inhaler with albuterol nebulizer and rescue inhaler.  The patient did not have any recent PFT done in the last low-dose CT scan done in the March did not show any lung nodules.  The patient has obesity and obstructive sleep apnea but he is unable to tolerate the BiPAP or CPAP.  He is not on any oxygen but used to have oxygen at home and is requesting oxygen at night if possible.  He had no recent sleep study or overnight oximetry done.  The patient also has allergy symptoms and he is using Astelin nasal spray fluticasone nasal spray and loratadine.  He has anxiety issues and is on alprazolam and BuSpar.    He lives at  home independently he did not have any recent hospitalizations and ER visit and urgent care visit he told me he is going with his son for a outdoor activity for few weeks and coming week.  He did have COVID vaccination and did not take influenza vaccination but is willing to take the pneumonia vaccination.  A pneumonia vaccine was given from the office today.    PFT done today:  Not done today      No results found for this or any previous visit.         Bronchodilator therapy: Breztri and Albuterol nebulizer and rescue inhaler    Smoking Status:   Social History     Tobacco Use   Smoking Status Former    Packs/day: 1.00    Years: 32.00    Pack years: 32.00    Types: Cigars, Cigarettes    Start date:     Quit date: 10/2015    Years since quittin.9   Smokeless Tobacco Former    Quit date:      Pulm Rehab: no  Sleep: yes    Support System: lives alone    Code Status:   There are no questions and answers to display.        Review of Systems:  A complete review of systems is performed and all other systems were reviewed and negative as note above in the HPI.  Review of Systems   Constitutional:  Positive for fatigue.   HENT:  Positive for congestion, postnasal drip and sinus pressure.    Eyes: Negative.    Respiratory:  Positive for chest tightness and shortness of breath.    Cardiovascular: Negative.    Gastrointestinal: Negative.    Endocrine: Negative.    Genitourinary: Negative.    Musculoskeletal:  Positive for arthralgias and back pain.   Skin: Negative.    Allergic/Immunologic: Positive for environmental allergies.   Neurological: Negative.    Hematological: Negative.    Psychiatric/Behavioral: Negative.       CAT/ACT Score:  Not done today    Medications:  Outpatient Encounter Medications as of 2023   Medication Sig Dispense Refill    acetaminophen (TYLENOL) 500 MG tablet Take 1 tablet by mouth Every 6 (Six) Hours As Needed for Mild Pain.      albuterol (PROVENTIL) (2.5 MG/3ML) 0.083% nebulizer  solution Take 2.5 mg by nebulization 3 (Three) Times a Day. 90 each 1    albuterol sulfate HFA (Ventolin HFA) 108 (90 Base) MCG/ACT inhaler Inhale 2 puffs Every 4 (Four) Hours As Needed for Wheezing or Shortness of Air. 18 g 5    allopurinol (ZYLOPRIM) 100 MG tablet TAKE ONE (1) TABLET BY MOUTH DAILY. 90 tablet 10    ALPRAZolam (Xanax) 0.5 MG tablet Take 1 tablet by mouth 2 (Two) Times a Day As Needed for Anxiety. (Patient taking differently: Take 1 tablet by mouth As Needed for Anxiety.) 30 tablet 0    Azelastine HCl 137 MCG/SPRAY solution 2 sprays into the nostril(s) as directed by provider 2 (Two) Times a Day. 60 mL 5    Budeson-Glycopyrrol-Formoterol (Breztri Aerosphere) 160-9-4.8 MCG/ACT aerosol inhaler Inhale 2 puffs 2 (Two) Times a Day. 10.7 g 10    buPROPion XL (WELLBUTRIN XL) 150 MG 24 hr tablet TAKE ONE (1) TABLET BY MOUTH DAILY. 90 tablet 10    busPIRone (BUSPAR) 15 MG tablet TAKE 1/2 (ONE-HALF) TABLET BY MOUTH 2 (TWO) TIMES A DAY. 30 tablet 3    fluticasone (FLONASE) 50 MCG/ACT nasal spray 2 sprays into the nostril(s) as directed by provider Daily. 16 g 5    gemfibrozil (LOPID) 600 MG tablet TAKE ONE (1) TABLET BY MOUTH DAILY. NEEDS OFFICE APPT. 90 tablet 4    hydroCHLOROthiazide (HYDRODIURIL) 25 MG tablet TAKE ONE (1) TABLET BY MOUTH DAILY 90 tablet 10    ibuprofen (ADVIL,MOTRIN) 600 MG tablet Take 1 tablet by mouth Every 6 (Six) Hours As Needed for Mild Pain.      losartan (COZAAR) 25 MG tablet TAKE ONE (1) TABLET BY MOUTH DAILY. 90 tablet 4    pantoprazole (PROTONIX) 40 MG EC tablet TAKE ONE (1) TABLET BY MOUTH DAILY 90 tablet 3    tadalafil (Cialis) 20 MG tablet Take 1 tablet by mouth Daily As Needed for Erectile Dysfunction. 30 tablet 1    [DISCONTINUED] albuterol (PROVENTIL) (2.5 MG/3ML) 0.083% nebulizer solution Take 2.5 mg by nebulization 3 (Three) Times a Day. 90 each 1    [DISCONTINUED] Azelastine HCl 137 MCG/SPRAY solution 2 sprays into the nostril(s) as directed by provider 2 (Two) Times a  "Day. 60 mL 5    [DISCONTINUED] Breztri Aerosphere 160-9-4.8 MCG/ACT aerosol inhaler INHALE 2 PUFFS 2 (TWO) TIMES A DAY. 10.7 g 10    [DISCONTINUED] fluticasone (FLONASE) 50 MCG/ACT nasal spray 2 sprays into the nostril(s) as directed by provider Daily.      [DISCONTINUED] Ventolin  (90 Base) MCG/ACT inhaler INHALE 2 PUFFS EVERY 4 (FOUR) HOURS AS NEEDED FOR WHEEZING OR SHORTNESS OF AIR. 18 g 5    loratadine (CLARITIN) 10 MG tablet Take 1 tablet by mouth Daily. 90 tablet 3     No facility-administered encounter medications on file as of 9/1/2023.       Allergies:  Allergies   Allergen Reactions    Adhesive Tape Rash     VERY SENSITIVE TO ADHESIVE     Latex Rash and Unknown - High Severity       Immunizations:  Immunization History   Administered Date(s) Administered    COVID-19 (MODERNA) 1st,2nd,3rd Dose Monovalent 07/06/2021, 08/06/2021       Objective:    Vitals:  /78   Pulse 90   Ht 167.6 cm (65.98\")   Wt 85.4 kg (188 lb 3.2 oz)   SpO2 97% Comment: RA  BMI 30.39 kg/mý     Physical Exam:  General: Patient is a 58 y.o. middle aged pleasant  male. Looks stated age. Appears to be in no acute distress.  Eyes: EOMI. PERRLA. Vision intact. No scleral icterus.  Ear, Nose, Mouth and Throat: Hearing is grossly intact. No Leukoplakia, pharyngitis, stomatitis or thrush. Swollen nasal mucosa with post nasal drop.  Neck: Range of motion of neck normal. No thyromegaly or masses. Mallampati Class 3  Respiratory: Clear to auscultation bilaterally. No use of accessory muscles. Decreased breath sounds.  Cardiovascular: Normal heart sounds. Regularly regular rhythm without murmur.  Gastrointestinal: Non tender, non distended, soft. Bowel sounds positive in all four quadrants. No organomegaly.  Skin: No obvious rashes, lesions, ulcers or large amount of bruising. No edema.   Neurological: No new motor deficits. Cranial nerves appear intact.  Psychiatric: Patient is alert and oriented to person, place and " time.    Chest Imaging:    Study Result    Narrative & Impression   CT CHEST LOW DOSE CANCER SCREENING WO- 3/28/2023 9:48 AM CDT     HISTORY: annual lung ca screening; Z87.891-Personal history of nicotine  dependence; F17.200-Nicotine dependence, unspecified, uncomplicated;  Z72.0-Tobacco use      COMPARISON: 01/19/2022, 11/18/2016     DOSE LENGTH PRODUCT: 52 mGy cm. Automated exposure control was also  utilized to decrease patient radiation dose.     TECHNIQUE: Axial images the chest are performed following IV contrast  according to low-dose protocol     FINDINGS:  No pathologic intrathoracic or axillary lymphadenopathy.  Heart remains normal in size. No thoracic aortic aneurysm. No  pericardial or pleural effusion. Minimal right greater than left  gynecomastia.     Images the upper abdomen degraded by the low-dose protocol. Adrenal  glands are not imaged in their entirety. Hepatic steatosis is  considered.     Linear scarring left lower lobe. Mild dependent basilar atelectasis. No  new or suspicious pulmonary nodules. No consolidation. No pneumothorax.  No discrete endobronchial lesion.     Moderate degenerative change mid to lower thoracic spine. No aggressive  regional bony lesions.     IMPRESSION:  1. No suspicious pulmonary nodule. Stable linear left lower lobe  scarring.  2. Lung RADS category 1-negative exam. Continued annual screening with  low-dose CT chest in 12 months recommended.  This report was finalized on 03/28/2023 10:07 by Dr. Michelle Madrid MD.       Assessment:  1. Chronic obstructive pulmonary disease, unspecified COPD type    2. DARRIN (obstructive sleep apnea)    3. History of COVID-19    4. History of chronic respiratory failure    5. Non-seasonal allergic rhinitis, unspecified trigger    6. Personal history of nicotine dependence    7. Bronchitis    8. Obesity (BMI 30-39.9)        Plan/Recommendations:    1.  Patient did not have any recent CT imaging already done.  He did have one done in  March 2023 and a follow-up low-dose CT screening of the chest will be done in the next 6 months time.  He will need annual CT screening of the chest due to smoking history and his age.  2.  He did not have any recent pulmonary function test done and he is using Breztri and albuterol nebulizer and rescue inhaler.  I ordered a PFT before his next clinic visit and he will also need to continue Breztri and albuterol nebulizer and rescue  Prescription refills were sent to the pharmacy.  3.  Patient could not tolerate CPAP for obstructive sleep apnea in spite of multiple attempts and does not want to use it and I advised the overnight oximetry at home to determine if patient will qualify for home oxygen at night.  It will be done through the [facility] and if needed we will set him up on home oxygen.  Healthy sleep style was discussed with the patient and weight loss was recommended.  4.  The patient will need to continue fluticasone nasal spray and loratadine and Astelin nasal spray and prescription refills were given to the patient.  He did not want to take influenza vaccine but had COVID-vaccine and pneumonia vaccine was given from the office today.  Continue follow-up with the primary care provider and return to the pulmonary clinic for a follow-up visit in 6 months time or earlier if needed.    Follow up:  6 Months    Time Spent:  30 minutes    I appreciate the opportunity of participating in this patient's care. I would like to thank the PCP for the referral.  Please feel free to contact me with any other questions.    Shanice Hebert MD   Pulmonologist/Intensivist     Electronically signed by: Shanice Hebert MD, 9/1/2023 09:53 CDT

## 2023-09-14 ENCOUNTER — OFFICE VISIT (OUTPATIENT)
Dept: FAMILY MEDICINE CLINIC | Facility: CLINIC | Age: 58
End: 2023-09-14
Payer: MEDICARE

## 2023-09-14 VITALS
WEIGHT: 188 LBS | TEMPERATURE: 97.1 F | SYSTOLIC BLOOD PRESSURE: 130 MMHG | RESPIRATION RATE: 18 BRPM | OXYGEN SATURATION: 99 % | HEIGHT: 66 IN | HEART RATE: 90 BPM | BODY MASS INDEX: 30.22 KG/M2 | DIASTOLIC BLOOD PRESSURE: 84 MMHG

## 2023-09-14 DIAGNOSIS — I10 PRIMARY HYPERTENSION: ICD-10-CM

## 2023-09-14 DIAGNOSIS — U07.1 COVID-19 VIRUS INFECTION: ICD-10-CM

## 2023-09-14 DIAGNOSIS — R05.9 COUGH, UNSPECIFIED TYPE: ICD-10-CM

## 2023-09-14 DIAGNOSIS — J44.9 CHRONIC OBSTRUCTIVE PULMONARY DISEASE, UNSPECIFIED COPD TYPE: ICD-10-CM

## 2023-09-14 LAB
EXPIRATION DATE: ABNORMAL
FLUAV AG UPPER RESP QL IA.RAPID: NOT DETECTED
FLUBV AG UPPER RESP QL IA.RAPID: NOT DETECTED
INTERNAL CONTROL: ABNORMAL
Lab: ABNORMAL
SARS-COV-2 AG UPPER RESP QL IA.RAPID: DETECTED

## 2023-09-14 NOTE — PROGRESS NOTES
BLUE”.   Subjective   Iam Gibbs is a 58 y.o. male presenting with chief complaint of:   Chief Complaint   Patient presents with    URI     Cough,congestion,body aches,  fever, diarrhea, fatigue     AWV NA  Last Completed Annual Wellness Visit       This patient has no relevant Health Maintenance data.             History of Present Illness :  Alone.  Here for primarily an acute issue today; above.   48 hrs of increasing sinus/chest congestion with nasal/sinus drainage, congestion and cough.  No SOB, hemoptysis.  Has had some covid vaccine.   No known exposure.    Has multiple chronic problems to consider that might have a bearing on today's issues; not an interval appointment.       Chronic/acute problems reviewed today:   1. Cough, unspecified type    2. Chronic obstructive pulmonary disease, unspecified COPD type    3. COVID-19 virus infection    4. Primary hypertension      Has an/another acute issue today: none.    The following portions of the patient's history were reviewed and updated as appropriate: allergies, current medications, past family history, past medical history, past social history, past surgical history, and problem list.      Current Outpatient Medications:     acetaminophen (TYLENOL) 500 MG tablet, Take 1 tablet by mouth Every 6 (Six) Hours As Needed for Mild Pain., Disp: , Rfl:     albuterol (PROVENTIL) (2.5 MG/3ML) 0.083% nebulizer solution, Take 2.5 mg by nebulization 3 (Three) Times a Day., Disp: 90 each, Rfl: 1    albuterol sulfate HFA (Ventolin HFA) 108 (90 Base) MCG/ACT inhaler, Inhale 2 puffs Every 4 (Four) Hours As Needed for Wheezing or Shortness of Air., Disp: 18 g, Rfl: 5    allopurinol (ZYLOPRIM) 100 MG tablet, TAKE ONE (1) TABLET BY MOUTH DAILY., Disp: 90 tablet, Rfl: 10    ALPRAZolam (Xanax) 0.5 MG tablet, Take 1 tablet by mouth 2 (Two) Times a Day As Needed for Anxiety. (Patient taking differently: Take 1 tablet by mouth As Needed for Anxiety.), Disp: 30 tablet,  Rfl: 0    Azelastine HCl 137 MCG/SPRAY solution, 2 sprays into the nostril(s) as directed by provider 2 (Two) Times a Day., Disp: 60 mL, Rfl: 5    Budeson-Glycopyrrol-Formoterol (Breztri Aerosphere) 160-9-4.8 MCG/ACT aerosol inhaler, Inhale 2 puffs 2 (Two) Times a Day., Disp: 10.7 g, Rfl: 10    buPROPion XL (WELLBUTRIN XL) 150 MG 24 hr tablet, TAKE ONE (1) TABLET BY MOUTH DAILY., Disp: 90 tablet, Rfl: 10    busPIRone (BUSPAR) 15 MG tablet, TAKE 1/2 (ONE-HALF) TABLET BY MOUTH 2 (TWO) TIMES A DAY., Disp: 30 tablet, Rfl: 3    fluticasone (FLONASE) 50 MCG/ACT nasal spray, 2 sprays into the nostril(s) as directed by provider Daily., Disp: 16 g, Rfl: 5    gemfibrozil (LOPID) 600 MG tablet, TAKE ONE (1) TABLET BY MOUTH DAILY. NEEDS OFFICE APPT., Disp: 90 tablet, Rfl: 4    hydroCHLOROthiazide (HYDRODIURIL) 25 MG tablet, TAKE ONE (1) TABLET BY MOUTH DAILY, Disp: 90 tablet, Rfl: 10    ibuprofen (ADVIL,MOTRIN) 600 MG tablet, Take 1 tablet by mouth Every 6 (Six) Hours As Needed for Mild Pain., Disp: , Rfl:     loratadine (CLARITIN) 10 MG tablet, Take 1 tablet by mouth Daily., Disp: 90 tablet, Rfl: 3    losartan (COZAAR) 25 MG tablet, TAKE ONE (1) TABLET BY MOUTH DAILY., Disp: 90 tablet, Rfl: 4    pantoprazole (PROTONIX) 40 MG EC tablet, TAKE ONE (1) TABLET BY MOUTH DAILY, Disp: 90 tablet, Rfl: 3    tadalafil (Cialis) 20 MG tablet, Take 1 tablet by mouth Daily As Needed for Erectile Dysfunction., Disp: 30 tablet, Rfl: 1    No problems with medications.    Allergies   Allergen Reactions    Adhesive Tape Rash     VERY SENSITIVE TO ADHESIVE     Latex Rash and Unknown - High Severity       Review of Systems   Constitutional:  Positive for activity change, appetite change and fatigue. Negative for fever.   HENT:  Positive for congestion.    Respiratory:  Positive for cough. Negative for shortness of breath and wheezing.    Cardiovascular:  Negative for chest pain and leg swelling.   Gastrointestinal: Negative.    Genitourinary:   Negative for dysuria.     Screening:  Mammogram: NA  Bone density: NA  Low dose CT chest: Tobacco-smoker/age 17/1ppd/dc 2015 (35):   IMPRESSION:  3.28.23  1. No suspicious pulmonary nodule. Stable linear left lower lobe  scarring.  2. Lung RADS category 1-negative exam. Continued annual screening with  low-dose CT chest in 12 months recommended.  GI:   Colon-p,hem/[facility]/JAMES/2.17.20/5y  EGD-nl/JAMES/[facility]/2.25.20  Prostate: offered 1.3.22/declined  Usual lab order  12m CBC, CMP, LIPID, TSH, fT4, Vit D, B12, folate, iron, PSAs, sed rate, CR-protein    Copy/paste function used for ROS/exam AND each area of these were reviewed, updated, confirmed and supplemented as needed.  Data reviewed:   Recent admit/ER/MD visits: 7.7.22    1. Hypertriglyceridemia    2. Primary hypertension    3. 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 Gastroesophageal reflux disease, unspecified whether esophagitis present    5. *Hx iron deficiency    6. Hyperuricemia-zyloprim    7. Chronic obstructive pulmonary disease, unspecified COPD type (HCC)    8. Fatty liver    9. Elevated fasting glucose    10. History of COVID-19    11. Cough    12. Hoarseness    13. Stridor-infrequent    14. Jelani v cord paresis (resser) ([place])    15. Erectile dysfunction, unspecified erectile dysfunction type          Discussions/medical decisions/reviews:  BP ok  Other vitals ok  DM/BS stable pattern-preDM  Lipid /trig 416  PSA ok last  CBC ok  Renal ok  Liver ok  Vit D ok  Thyroid ok     Weight loss advised; 8% in studies; CT abdomen shows fatty liver AND ? Spleenomegaly.  Thankfully liver test ok; but needs close f/u; 1y repeat US liver  Continue to follow with hematology  Augmentation of ED Rx with ring discussed; avoid prolonged application and needs to be firm but not too tight  Pro/con referral to pulmonary; opinions on residual covid cough     Medical decision issues:   Data review above:   Rx: reviewed and decisions:    Visit today involved chronic significant medical problems or differentials and/or intensive drug monitoring: ie potential to cause serious morbidity or death:   Rx changes: none  No orders of the defined types were placed in this encounter.        Orders placed:   LAB/Testing/Referrals: reviewed/orders:   Today:       Orders Placed This Encounter   Procedures    Ambulatory Referral to Pulmonology       Last cardiac testing:   Echo:   Results for orders placed during the hospital encounter of 01/18/23    Adult Transthoracic Echo Complete w/ Color, Spectral and Contrast if necessary per protocol    Interpretation Summary    Left ventricular systolic function is normal. Left ventricular ejection fraction appears to be 56 - 60%.    Left ventricular diastolic function was normal.    Abnormal global longitudinal LV strain (GLS) = -9.5%.    Estimated right ventricular systolic pressure from tricuspid regurgitation is normal (<35 mmHg).    There is a trivial pericardial effusion. The effusion is fluid filled. There is no evidence of cardiac tamponade.    Radiology considered:   No radiology results for the last 90 days.    Lab Results:  Results for orders placed or performed in visit on 09/14/23   Covid-19 + Flu A&B AG, Veritor    Specimen: Swab   Result Value Ref Range    SARS Antigen Detected (A) Not Detected, Presumptive Negative    Influenza A Antigen AARON Not Detected Not Detected    Influenza B Antigen AARON Not Detected Not Detected    Internal Control Passed Passed    Lot Number [id number]     Expiration Date 4,[id number]        A1C:No results for input(s): HGBA1C in the last 64169 hours.  GLUCOSE:  Lab Results - Last 18 Months   Lab Units 06/27/23  1040 03/28/23  0919 11/29/22  0920 07/05/22  0730 05/26/22  0903 04/22/22  1008   GLUCOSE mg/dL 119* 99 96 104* 90 112*     LIPID:  Lab Results - Last 18 Months   Lab Units 07/05/22  0730   CHOLESTEROL mg/dL 220*   LDL CHOL mg/dL 112*   HDL CHOL mg/dL 36*   TRIGLYCERIDES  mg/dL 416*     PSA:  Lab Results   Component Value Date/Time    PSA 0.649 01/03/2022 08:53 AM    PSA 0.657 01/21/2020 07:19 AM       CBC:  Lab Results - Last 18 Months   Lab Units 06/27/23  1040 03/28/23  0919 01/18/23  0944 11/29/22  0920 08/11/22  1228 07/21/22  1005 07/07/22  1234 07/05/22  0730 05/26/22  0903 04/22/22  1008   WBC 10*3/mm3 7.09 7.32 6.06 7.67 5.93 6.28 6.42 6.78   < > 7.56   HEMOGLOBIN g/dL 16.9 16.2 15.8 16.7 15.1 15.4 14.8 15.7   < > 16.4   HEMATOCRIT % 52.1* 49.6 47.8 51.8* 45.9 45.3 43.2 46.5   < > 48.1   PLATELETS 10*3/mm3 234 241 239 247 259 263 256 265   < > 229   IRON mcg/dL  --   --   --   --   --   --   --  71  --  120   VITAMIN B 12 pg/mL  --   --   --   --   --   --   --  371  --   --    FOLATE ng/mL  --   --   --   --   --   --   --  9.57  --   --     < > = values in this interval not displayed.     BMP/CMP:  Lab Results - Last 18 Months   Lab Units 06/27/23  1040 03/28/23  0919 11/29/22  0920 07/05/22  0730 05/26/22  0903 04/22/22  1008   SODIUM mmol/L 139 141 137 142 138 140   POTASSIUM mmol/L 4.7 3.8 4.1 4.1 3.8 4.1   CHLORIDE mmol/L 98 98 98 99 102 101   CO2 mmol/L 29.0 32.0* 31.0* 30.2* 28.0 31.0*   GLUCOSE mg/dL 119* 99 96 104* 90 112*   BUN mg/dL 23* 20 20 14 26* 19   CREATININE mg/dL 1.05 1.01 0.96 0.92 0.95 0.85   EGFR RESULT mL/min/1.73  --   --   --  98.2  --   --    CALCIUM mg/dL 9.5 9.6 9.2 9.6 8.9 9.6   URIC ACID mg/dL  --   --   --  7.0  --   --        HEPATIC:  Lab Results - Last 18 Months   Lab Units 06/27/23  1040 03/28/23  0919 11/29/22  0920 07/05/22  0730 05/26/22  0903 04/22/22  1008   ALT (SGPT) U/L 31 30 24 24 20 24   AST (SGOT) U/L 19 22 16 19 15 19   ALK PHOS U/L 55 55 54 54 50 47     HEPATITIS C ANTIBODY:   Lab Results   Component Value Date/Time    HEPCVIRUSABY <0.1 05/28/2020 07:28 AM     Vit D:  Lab Results - Last 18 Months   Lab Units 07/05/22  0730   VIT D 25 HYDROXY ng/ml 37.0     THYROID:  Lab Results - Last 18 Months   Lab Units 07/05/22 0730   TSH  "uIU/mL 2.020   FREE T4 ng/dL 1.20       Objective   /84   Pulse 90   Temp 97.1 °F (36.2 °C) (Temporal)   Resp 18   Ht 167.6 cm (65.98\")   Wt 85.3 kg (188 lb)   SpO2 99%   BMI 30.36 kg/m²   Body mass index is 30.36 kg/m².    Recent Vitals         6/27/2023 9/1/2023 9/14/2023       BP: 148/90 124/78 130/84     Pulse: 93 90 90     Temp: -- -- 97.1 °F (36.2 °C)     Weight: -- 85.4 kg (188 lb 3.2 oz) 85.3 kg (188 lb)     BMI (Calculated): -- 30.4 30.4           Wt Readings from Last 15 Encounters:   09/14/23 1058 85.3 kg (188 lb)   09/01/23 0917 85.4 kg (188 lb 3.2 oz)   04/26/23 1559 85.7 kg (189 lb)   04/07/23 1014 87.1 kg (192 lb)   03/28/23 1007 87.9 kg (193 lb 12.8 oz)   03/13/23 1440 88 kg (194 lb)   01/18/23 1541 87.1 kg (192 lb)   12/07/22 1518 87.3 kg (192 lb 6.4 oz)   11/29/22 0927 85.8 kg (189 lb 1.6 oz)   11/21/22 0836 83.9 kg (185 lb)   11/16/22 1556 83.9 kg (185 lb)   08/31/22 1305 83.9 kg (185 lb)   07/07/22 1305 86.2 kg (190 lb)   07/07/22 0954 86.5 kg (190 lb 12.8 oz)   06/09/22 0938 83.9 kg (185 lb)       Physical Exam  GENERAL:  Well nourished/developed in no acute distress.   SKIN: Turgor excellent, without wound, rash, lesion  HEENT: Normal cephalic without trauma.  Pupils equal round reactive to light. Extraocular motions full without nystagmus.  No thyromegaly, mass, tenderness, lymphadenopathy and supple.  CV: Regular rhythm.  No murmur, gallop,  edema. .  CHEST: No chest wall tenderness or mass.   LUNGS: Symmetric motion with clear/decreased to auscultation.    ABD: Soft, nontender without mass.   ORTHO: Symmetric extremities without swelling/point tenderness  Other full gross range of motion.  Walking without assistive device.   NEURO: CN 2-12 grossly intact.  Symmetric facies and UE/LE.  1-2/5 strength throughout. 1/4 x bicep equal reflexes. Marked hand/foot muscle atrophy.  Nonfocal use extremities. Speech clear.    PSYCH: Oriented x 3.  Pleasant calm, well kept.  " Purposeful/directed conservation with intact short/long gross memory.     Assessment & Plan     1. Cough, unspecified type    2. Chronic obstructive pulmonary disease, unspecified COPD type    3. COVID-19 virus infection    4. Primary hypertension        Data review above:   Discussions/medical decisions/reviews:  BP ok  Other vitals ok  Recent Vitals         6/27/2023 9/1/2023 9/14/2023       BP: 148/90 124/78 130/84     Pulse: 93 90 90     Temp: -- -- 97.1 °F (36.2 °C)     Weight: -- 85.4 kg (188 lb 3.2 oz) 85.3 kg (188 lb)     BMI (Calculated): -- 30.4 30.4           DM/ 6.27.23  Lipid  tri 416 7.5.22; lopid  PSA ok 1.3.22  CBC Hct 52 6.27.23; sees hematology  Iron 71 7.5.22  B12 371 7.5.22  Folate 9.57 7.5.22  Renal ok 6.27.23  Liver ok 6.27.23  Vit D 37 7.5.22  Thyroid TSH, fT4 ok 7.5.22    Vaccines discussed (see below) covid, flu and RSV this winter; into October  While typing; saw PSAs missing; asking staff to help him get wellness/annual before end of year at office (goes beyond hematology and pulmonary doctors)    No cialis for 10 days  We discussed COVID has changed from the initial delta omicron L foot which seem to be more severe and cause a lot more morbidity and mortality; to the current variants that generally cause a mild sinusitis bronchitis type picture.  We discussed the current recommendations of 5 days isolation; if feeling well on day 5 with no fever for 24 hours, the next 5 days can be a return to society by social distancing (avoid crowds large groups, diligently wear the mask, diligently keep 6 foot distance)  We discussed a role for retesting that we call curiosity testing; it makes sense to do this around day 10 and its primary purpose would be to discover whether 1 might be contagious on day 10-11 if they chose to be around ill folks (suggested they google for free government test to be shipped to them if they do not have any at home)  We discussed how many employers will  have their own policies that go along with this  Pros and cons of Paxlovid were discussed; there seems to be very few negatives or significant side effects as long as we checked their medications and health issues  We discussed Paxlovid relapses; asking them to let us know if that seems to happen  If there is any chance of shortness of breath developing we suggest going to [facility]  We discussed individuals staying current with vaccinations which we encourage        Follow up: Return for covid +/rapid discharge; return as planned.  Future Appointments   Date Time Provider Department Center   9/26/2023 10:30 AM  [facility]  [facility]   9/26/2023 11:00 AM Nat Paez APRN [facility]   3/5/2024 10:45 AM THERAPIST RESPIRATORY DI [facility]   3/5/2024 11:00 AM Shanice Hebert MD [facility]   3/29/2024  3:30 PM [facility] [place] [facility] 1  [facility] CT [facility] [place]       Data review above:   Rx: reviewed and decisions:   Rx new/changes:   New Medications Ordered This Visit   Medications    Nirmatrelvir&Ritonavir 300/100 (PAXLOVID) 20 x 150 MG & 10 x 100MG tablet therapy pack tablet     Sig: Take 3 tablets by mouth 2 (Two) Times a Day for 5 days.     Dispense:  30 tablet     Refill:  0     DO NOT use in patients < 12 years or in patients with eGFR < 30. Dose adjustment to 2 tablets BID required for patients with eGFR >/= 30 - < 60.     Order Specific Question:   I have reviewed the patient's medication list prior to prescribing Paxlovid due to the risk of serious adverse reactions secondary to drug interactions. I will consult with pharmacy, if needed     Answer:   Patient's medication list reviewed       Orders placed:   LAB/Testing/Referrals: reviewed/orders:   Today:   Orders Placed This Encounter   Procedures    Covid-19 + Flu A&B AG, Veritor     Chronic/recurrent labs above or change to:   Same     Immunization History   Administered Date(s) Administered    COVID-19 (MODERNA)  "1st,2nd,3rd Dose Monovalent 07/06/2021, 08/06/2021    Pneumococcal Conjugate 20-Valent (PCV20) 09/01/2023     We advised/reaffirmed our support/suggestion for staying complete with covid- covid boosters, seasonal flu/yearly and any missing vaccine from list we supplied; when cannot be given here we suggest contact with local health department office or pharmacy to review missing/needed vaccines and then bring nursing documentation for these vaccines to this office or call this information in. Shingles became \"free\" 1.1.23 for medicare insurance.    Health maintenance:   Body mass index is 30.36 kg/m².  BMI is >= 30 and <35. (Class 1 Obesity). The following options were offered after discussion;: exercise counseling/recommendations and nutrition counseling/recommendations      Tobacco use reviewed:   Iam Gibbs  reports that he quit smoking about 7 years ago. His smoking use included cigars and cigarettes. He started smoking about 40 years ago. He has a 32.00 pack-year smoking history. He quit smokeless tobacco use about 23 years ago..    There are no Patient Instructions on file for this visit.          "

## 2023-09-21 ENCOUNTER — PATIENT MESSAGE (OUTPATIENT)
Dept: FAMILY MEDICINE CLINIC | Facility: CLINIC | Age: 58
End: 2023-09-21
Payer: MEDICARE

## 2023-09-22 NOTE — TELEPHONE ENCOUNTER
From: Iam Gibbs  To: Nick Jackson  Sent: 9/21/2023 6:47 PM CDT  Subject: Thrush    After having Covid and a round of Paxlovid, I now have thrush and am requesting an antibiotic to get rid of it please! Thanks much!

## 2023-10-03 DIAGNOSIS — Z87.09 HISTORY OF CHRONIC RESPIRATORY FAILURE: ICD-10-CM

## 2023-10-03 NOTE — PROGRESS NOTES
Reviewed notes - this test ordered by pulmonary as part of a bigger workup.  Will awaiting their responses before ordering anything further.    Electronically signed by RUEL Lyman, 10/03/23, 11:03 AM CDT.

## 2023-10-09 RX ORDER — LOSARTAN POTASSIUM 25 MG/1
TABLET ORAL
Qty: 90 TABLET | Refills: 4 | Status: SHIPPED | OUTPATIENT
Start: 2023-10-09

## 2023-11-20 RX ORDER — GEMFIBROZIL 600 MG/1
TABLET, FILM COATED ORAL
Qty: 90 TABLET | Refills: 4 | Status: SHIPPED | OUTPATIENT
Start: 2023-11-20

## 2023-11-20 NOTE — TELEPHONE ENCOUNTER
Rx Refill Note  Requested Prescriptions     Pending Prescriptions Disp Refills    gemfibrozil (LOPID) 600 MG tablet [Pharmacy Med Name: GEMFIBROZIL 600MG TABLET] 90 tablet 4     Sig: TAKE ONE (1) TABLET BY MOUTH DAILY. NEEDS OFFICE APPT.      Last office visit with prescribing clinician: 4/7/2023   Last telemedicine visit with prescribing clinician: Visit date not found   Next office visit with prescribing clinician: Visit date not found                         Would you like a call back once the refill request has been completed: [] Yes [] No    If the office needs to give you a call back, can they leave a voicemail: [] Yes [] No    Radha Loving Rep  11/20/23, 15:37 CST

## 2023-12-26 DIAGNOSIS — J40 BRONCHITIS: ICD-10-CM

## 2023-12-26 RX ORDER — ALBUTEROL SULFATE 90 UG/1
2 AEROSOL, METERED RESPIRATORY (INHALATION) EVERY 4 HOURS PRN
Qty: 18 G | Refills: 5 | Status: SHIPPED | OUTPATIENT
Start: 2023-12-26

## 2023-12-29 ENCOUNTER — TELEPHONE (OUTPATIENT)
Dept: FAMILY MEDICINE CLINIC | Facility: CLINIC | Age: 58
End: 2023-12-29
Payer: MEDICARE

## 2023-12-29 ENCOUNTER — CLINICAL SUPPORT (OUTPATIENT)
Dept: FAMILY MEDICINE CLINIC | Facility: CLINIC | Age: 58
End: 2023-12-29
Payer: MEDICARE

## 2023-12-29 DIAGNOSIS — R05.9 COUGH, UNSPECIFIED TYPE: Primary | ICD-10-CM

## 2023-12-29 LAB
EXPIRATION DATE: NORMAL
FLUAV AG UPPER RESP QL IA.RAPID: NOT DETECTED
FLUBV AG UPPER RESP QL IA.RAPID: NOT DETECTED
INTERNAL CONTROL: NORMAL
Lab: NORMAL
SARS-COV-2 AG UPPER RESP QL IA.RAPID: NOT DETECTED

## 2023-12-29 RX ORDER — AZITHROMYCIN 250 MG/1
TABLET, FILM COATED ORAL
Qty: 6 TABLET | Refills: 0 | Status: SHIPPED | OUTPATIENT
Start: 2023-12-29

## 2023-12-29 NOTE — TELEPHONE ENCOUNTER
----- Message from Radha Loving sent at 12/29/2023 11:36 AM CST -----  Regarding: FW: Sinus infection   Contact: [phone]  Please advise  ----- Message -----  From: Iam Gibbs  Sent: 12/29/2023  11:01 AM CST  To: Magi Espinoza [place] [facility]  Subject: Sinus infection                                  Such as not suck at

## 2023-12-29 NOTE — PROGRESS NOTES
Patient came in with cough, patient was tested for covid, Flu A/B, all were negative, spoke with RO and was advised to run an RSV through [facility] and prescribe a zpak for patient, patient verbalized understanding

## 2023-12-30 LAB — RSV AG NPH QL IA: NEGATIVE

## 2024-01-02 ENCOUNTER — TELEPHONE (OUTPATIENT)
Dept: FAMILY MEDICINE CLINIC | Facility: CLINIC | Age: 59
End: 2024-01-02

## 2024-01-02 ENCOUNTER — OFFICE VISIT (OUTPATIENT)
Dept: FAMILY MEDICINE CLINIC | Facility: CLINIC | Age: 59
End: 2024-01-02
Payer: MEDICARE

## 2024-01-02 VITALS
TEMPERATURE: 97.3 F | HEART RATE: 86 BPM | RESPIRATION RATE: 18 BRPM | HEIGHT: 66 IN | SYSTOLIC BLOOD PRESSURE: 140 MMHG | BODY MASS INDEX: 30.37 KG/M2 | OXYGEN SATURATION: 95 % | DIASTOLIC BLOOD PRESSURE: 84 MMHG | WEIGHT: 189 LBS

## 2024-01-02 DIAGNOSIS — J44.9 CHRONIC OBSTRUCTIVE PULMONARY DISEASE, UNSPECIFIED COPD TYPE: ICD-10-CM

## 2024-01-02 DIAGNOSIS — R05.1 ACUTE COUGH: ICD-10-CM

## 2024-01-02 DIAGNOSIS — J44.1 COPD EXACERBATION: ICD-10-CM

## 2024-01-02 RX ORDER — PREDNISONE 10 MG/1
TABLET ORAL
Qty: 25 TABLET | Refills: 0 | Status: SHIPPED | OUTPATIENT
Start: 2024-01-02

## 2024-01-02 NOTE — TELEPHONE ENCOUNTER
----- Message from Radha Loving sent at 1/2/2024 10:49 AM CST -----  Patient notified of results, spoke with understanding. Patient reports not getting any better. He stated he was given a Zpak and it doesn't seem to have helped. Says he is coughing constantly and it is not a productive cough. He stated he is having a lot of sinus drainage. No fever. Please advise

## 2024-01-02 NOTE — TELEPHONE ENCOUNTER
Hub staff attempted to follow warm transfer process and was unsuccessful     Caller: Iam Gibbs    Relationship to patient: Self    Best call back number:  [phone]     Patient is needing:  CALLING US BACK RETURNING CALL.  HE SAYS HE FEELS WORSE THAN WHEN HE WAS LAST HERE.

## 2024-01-02 NOTE — PROGRESS NOTES
BLUE”.   Subjective   Iam Gibbs is a 58 y.o. male presenting with chief complaint of:   Chief Complaint   Patient presents with    URI     Cough,sinus drainage     AWV NA  Last Completed Annual Wellness Visit       This patient has no relevant Health Maintenance data.             History of Present Illness :  Alone.  Here for primarily persistant cough.   Known COPD with residual cough/dry after phone message 12.29.23     I’ve had this nasally sinus crap for two days.. It’s my only symptom.  Was hoping that you could prescribe something to help get rid of it suck at antibiotics or steroids or both. Thanks   Stopped with Flu A, B, covid neg; RSV returned neg    Has multiple chronic problems to consider that might have a bearing on today's issues; not an interval appointment.       Chronic/acute problems reviewed today:   1. Chronic obstructive pulmonary disease, unspecified COPD type    2. Acute cough    3. COPD exacerbation      Has an/another acute issue today: none.    The following portions of the patient's history were reviewed and updated as appropriate: allergies, current medications, past family history, past medical history, past social history, past surgical history, and problem list.      Current Outpatient Medications:     acetaminophen (TYLENOL) 500 MG tablet, Take 1 tablet by mouth Every 6 (Six) Hours As Needed for Mild Pain., Disp: , Rfl:     albuterol (PROVENTIL) (2.5 MG/3ML) 0.083% nebulizer solution, Take 2.5 mg by nebulization 3 (Three) Times a Day., Disp: 90 each, Rfl: 1    allopurinol (ZYLOPRIM) 100 MG tablet, TAKE ONE (1) TABLET BY MOUTH DAILY., Disp: 90 tablet, Rfl: 10    ALPRAZolam (Xanax) 0.5 MG tablet, Take 1 tablet by mouth 2 (Two) Times a Day As Needed for Anxiety. (Patient taking differently: Take 1 tablet by mouth As Needed for Anxiety.), Disp: 30 tablet, Rfl: 0    Azelastine HCl 137 MCG/SPRAY solution, 2 sprays into the nostril(s) as directed by provider 2 (Two) Times a  Day., Disp: 60 mL, Rfl: 5    Budeson-Glycopyrrol-Formoterol (Breztri Aerosphere) 160-9-4.8 MCG/ACT aerosol inhaler, Inhale 2 puffs 2 (Two) Times a Day., Disp: 10.7 g, Rfl: 10    buPROPion XL (WELLBUTRIN XL) 150 MG 24 hr tablet, TAKE ONE (1) TABLET BY MOUTH DAILY., Disp: 90 tablet, Rfl: 10    busPIRone (BUSPAR) 15 MG tablet, TAKE 1/2 (ONE-HALF) TABLET BY MOUTH 2 (TWO) TIMES A DAY., Disp: 30 tablet, Rfl: 3    fluticasone (FLONASE) 50 MCG/ACT nasal spray, 2 sprays into the nostril(s) as directed by provider Daily., Disp: 16 g, Rfl: 5    gemfibrozil (LOPID) 600 MG tablet, TAKE ONE (1) TABLET BY MOUTH DAILY. NEEDS OFFICE APPT., Disp: 90 tablet, Rfl: 4    hydroCHLOROthiazide (HYDRODIURIL) 25 MG tablet, TAKE ONE (1) TABLET BY MOUTH DAILY, Disp: 90 tablet, Rfl: 10    ibuprofen (ADVIL,MOTRIN) 600 MG tablet, Take 1 tablet by mouth Every 6 (Six) Hours As Needed for Mild Pain., Disp: , Rfl:     loratadine (CLARITIN) 10 MG tablet, Take 1 tablet by mouth Daily., Disp: 90 tablet, Rfl: 3    losartan (COZAAR) 25 MG tablet, TAKE ONE (1) TABLET BY MOUTH DAILY., Disp: 90 tablet, Rfl: 4    nystatin (MYCOSTATIN) 100,000 unit/mL suspension, Swish and swallow 5 mL 4 (Four) Times a Day., Disp: 120 mL, Rfl: 0    pantoprazole (PROTONIX) 40 MG EC tablet, TAKE ONE (1) TABLET BY MOUTH DAILY, Disp: 90 tablet, Rfl: 3    tadalafil (Cialis) 20 MG tablet, Take 1 tablet by mouth Daily As Needed for Erectile Dysfunction., Disp: 30 tablet, Rfl: 1    Ventolin  (90 Base) MCG/ACT inhaler, INHALE 2 PUFFS EVERY 4 (FOUR) HOURS AS NEEDED FOR WHEEZING OR SHORTNESS OF AIR., Disp: 18 g, Rfl: 5    azithromycin (Zithromax Z-Gavino) 250 MG tablet, Take 2 tablets by mouth on day 1, then 1 tablet daily on days 2-5 (Patient not taking: Reported on 1/2/2024), Disp: 6 tablet, Rfl: 0    No problems with medications.    Allergies   Allergen Reactions    Adhesive Tape Rash     VERY SENSITIVE TO ADHESIVE     Latex Rash and Unknown - High Severity       Review of Systems  "  Constitutional:  Positive for fatigue. Negative for activity change, appetite change and fever.   HENT:  Negative for dental problem.    Respiratory:  Positive for cough and wheezing. Negative for shortness of breath.    Cardiovascular:  Negative for chest pain and leg swelling.   Gastrointestinal: Negative.    Genitourinary:  Negative for dysuria.     Copy/paste function used for ROS/exam AND each area of these were reviewed, updated, confirmed and supplemented as needed.  Data reviewed:   Recent admit/ER/MD visits: none  Last cardiac testing:   Echo:   Results for orders placed during the hospital encounter of 01/18/23    Adult Transthoracic Echo Complete w/ Color, Spectral and Contrast if necessary per protocol    Interpretation Summary    Left ventricular systolic function is normal. Left ventricular ejection fraction appears to be 56 - 60%.    Left ventricular diastolic function was normal.    Abnormal global longitudinal LV strain (GLS) = -9.5%.    Estimated right ventricular systolic pressure from tricuspid regurgitation is normal (<35 mmHg).    There is a trivial pericardial effusion. The effusion is fluid filled. There is no evidence of cardiac tamponade.    Radiology considered:   No radiology results for the last 90 days.    Lab Results:  Results for orders placed or performed in visit on 12/29/23   RSV Screen - ,    NL   Result Value Ref Range    RSV Ag, EIA Negative Negative       A1C:No results for input(s): \"HGBA1C\" in the last 15141 hours.  GLUCOSE:  Lab Results - Last 18 Months   Lab Units 06/27/23  1040 03/28/23  0919 11/29/22  0920 07/05/22  0730   GLUCOSE mg/dL 119* 99 96 104*     LIPID:  Lab Results - Last 18 Months   Lab Units 07/05/22  0730   CHOLESTEROL mg/dL 220*   LDL CHOL mg/dL 112*   HDL CHOL mg/dL 36*   TRIGLYCERIDES mg/dL 416*     PSA:  Lab Results   Component Value Date/Time    PSA 0.649 01/03/2022 08:53 AM    PSA 0.657 01/21/2020 07:19 AM       CBC:  Lab Results - Last 18 Months " "  Lab Units 06/27/23  1040 03/28/23  0919 01/18/23  0944 11/29/22  0920 08/11/22  1228 07/21/22  1005 07/07/22  1234 07/05/22  0730   WBC 10*3/mm3 7.09 7.32 6.06 7.67 5.93 6.28 6.42 6.78   HEMOGLOBIN g/dL 16.9 16.2 15.8 16.7 15.1 15.4 14.8 15.7   HEMATOCRIT % 52.1* 49.6 47.8 51.8* 45.9 45.3 43.2 46.5   PLATELETS 10*3/mm3 234 241 239 247 259 263 256 265   IRON mcg/dL  --   --   --   --   --   --   --  71   VITAMIN B 12 pg/mL  --   --   --   --   --   --   --  371   FOLATE ng/mL  --   --   --   --   --   --   --  9.57     BMP/CMP:  Lab Results - Last 18 Months   Lab Units 06/27/23  1040 03/28/23  0919 11/29/22  0920 07/05/22  0730   SODIUM mmol/L 139 141 137 142   POTASSIUM mmol/L 4.7 3.8 4.1 4.1   CHLORIDE mmol/L 98 98 98 99   CO2 mmol/L 29.0 32.0* 31.0* 30.2*   GLUCOSE mg/dL 119* 99 96 104*   BUN mg/dL 23* 20 20 14   CREATININE mg/dL 1.05 1.01 0.96 0.92   EGFR RESULT mL/min/1.73  --   --   --  98.2   CALCIUM mg/dL 9.5 9.6 9.2 9.6   URIC ACID mg/dL  --   --   --  7.0       HEPATIC:  Lab Results - Last 18 Months   Lab Units 06/27/23  1040 03/28/23  0919 11/29/22  0920 07/05/22  0730   ALT (SGPT) U/L 31 30 24 24   AST (SGOT) U/L 19 22 16 19   ALK PHOS U/L 55 55 54 54     HEPATITIS C ANTIBODY:   Lab Results   Component Value Date/Time    HEPCVIRUSABY <0.1 05/28/2020 07:28 AM     Vit D:  Lab Results - Last 18 Months   Lab Units 07/05/22  0730   VIT D 25 HYDROXY ng/ml 37.0     THYROID:  Lab Results - Last 18 Months   Lab Units 07/05/22  0730   TSH uIU/mL 2.020   FREE T4 ng/dL 1.20       Objective   /84   Pulse 86   Temp 97.3 °F (36.3 °C) (Temporal)   Resp 18   Ht 167.6 cm (65.98\")   Wt 85.7 kg (189 lb)   SpO2 95%   BMI 30.52 kg/m²   Body mass index is 30.52 kg/m².    Recent Vitals         9/1/2023 9/14/2023 1/2/2024       BP: 124/78 130/84 140/84     Pulse: 90 90 86     Temp: -- 97.1 °F (36.2 °C) 97.3 °F (36.3 °C)     Weight: 85.4 kg (188 lb 3.2 oz) 85.3 kg (188 lb) 85.7 kg (189 lb)     BMI (Calculated): 30.4 " 30.4 30.5           Wt Readings from Last 15 Encounters:   01/02/24 1551 85.7 kg (189 lb)   09/14/23 1058 85.3 kg (188 lb)   09/01/23 0917 85.4 kg (188 lb 3.2 oz)   04/26/23 1559 85.7 kg (189 lb)   04/07/23 1014 87.1 kg (192 lb)   03/28/23 1007 87.9 kg (193 lb 12.8 oz)   03/13/23 1440 88 kg (194 lb)   01/18/23 1541 87.1 kg (192 lb)   12/07/22 1518 87.3 kg (192 lb 6.4 oz)   11/29/22 0927 85.8 kg (189 lb 1.6 oz)   11/21/22 0836 83.9 kg (185 lb)   11/16/22 1556 83.9 kg (185 lb)   08/31/22 1305 83.9 kg (185 lb)   07/07/22 1305 86.2 kg (190 lb)   07/07/22 0954 86.5 kg (190 lb 12.8 oz)       Physical Exam  Constitutional:       General: He is not in acute distress.  HENT:      Right Ear: Tympanic membrane, ear canal and external ear normal.      Left Ear: Tympanic membrane, ear canal and external ear normal.      Nose: No congestion or rhinorrhea.      Mouth/Throat:      Mouth: Mucous membranes are moist.      Pharynx: No oropharyngeal exudate or posterior oropharyngeal erythema.   Cardiovascular:      Rate and Rhythm: Normal rate and regular rhythm.   Pulmonary:      Effort: Pulmonary effort is normal.      Breath sounds: Wheezing present. No rhonchi or rales.   Abdominal:      Palpations: Abdomen is soft.   Musculoskeletal:      Right lower leg: No edema.      Left lower leg: No edema.   Neurological:      Mental Status: He is alert and oriented to person, place, and time. Mental status is at baseline.       Assessment & Plan     1. Chronic obstructive pulmonary disease, unspecified COPD type    2. Acute cough    3. COPD exacerbation        Data review above:   Discussions/medical decisions/reviews:  BP ok  Other vitals ok  Recent Vitals         9/1/2023 9/14/2023 1/2/2024       BP: 124/78 130/84 140/84     Pulse: 90 90 86     Temp: -- 97.1 °F (36.2 °C) 97.3 °F (36.3 °C)     Weight: 85.4 kg (188 lb 3.2 oz) 85.3 kg (188 lb) 85.7 kg (189 lb)     BMI (Calculated): 30.4 30.4 30.5           Steroid and   Neb  "altuberol    Follow up: Return for as planned.  Future Appointments   Date Time Provider Department Center   3/5/2024 10:45 AM THERAPIST RESPIRATORY YADY SUAREZ [facility]   3/5/2024 11:00 AM Shanice Hebert MD [facility]   3/29/2024  3:30 PM [facility] [address] [facility] [place]       Data review above:   Rx: reviewed and decisions:   Rx new/changes: brief  New Medications Ordered This Visit   Medications    predniSONE (DELTASONE) 10 MG tablet     Sig: Two twice a day for 3 days then two a day for 3 days then one a day for 7 days     Dispense:  25 tablet     Refill:  0     Pharmacist tell patient: When using steroids A. Do not use anti-inflammatories such as Motrin/ibuprofen, Alleve/naprosyn, Mobic and like medications       Orders placed:   LAB/Testing/Referrals: reviewed/orders:   Today:   No orders of the defined types were placed in this encounter.    Chronic/recurrent labs above or change to:   Same     Immunization History   Administered Date(s) Administered    COVID-19 (MODERNA) 1st,2nd,3rd Dose Monovalent 07/06/2021, 08/06/2021    Pneumococcal Conjugate 20-Valent (PCV20) 09/01/2023     We advised/reaffirmed our support/suggestion for staying complete with covid- covid boosters, seasonal flu/yearly and any missing vaccine from list we supplied; when cannot be given here we suggest contact with local health department office or pharmacy to review missing/needed vaccines and then bring nursing documentation for these vaccines to this office or call this information in. Shingles became \"free\" 1.1.23 for medicare insurance.    Health maintenance:   Body mass index is 30.52 kg/m².         Tobacco use reviewed:   Iam Gibbs  reports that he quit smoking about 8 years ago. His smoking use included cigars and cigarettes. He started smoking about 41 years ago. He has a 32.00 pack-year smoking history. He quit smokeless tobacco use about 24 years ago..     There are no Patient Instructions on " file for this visit.

## 2024-01-17 DIAGNOSIS — F41.9 ANXIETY: ICD-10-CM

## 2024-01-17 DIAGNOSIS — E78.1 HYPERTRIGLYCERIDEMIA: ICD-10-CM

## 2024-01-17 RX ORDER — BUSPIRONE HYDROCHLORIDE 15 MG/1
TABLET ORAL
Qty: 30 TABLET | Refills: 3 | Status: SHIPPED | OUTPATIENT
Start: 2024-01-17

## 2024-01-29 ENCOUNTER — LAB (OUTPATIENT)
Dept: LAB | Facility: HOSPITAL | Age: 59
End: 2024-01-29
Payer: MEDICARE

## 2024-01-29 ENCOUNTER — OFFICE VISIT (OUTPATIENT)
Dept: ONCOLOGY | Facility: CLINIC | Age: 59
End: 2024-01-29
Payer: MEDICARE

## 2024-01-29 VITALS
SYSTOLIC BLOOD PRESSURE: 128 MMHG | WEIGHT: 195 LBS | HEART RATE: 94 BPM | BODY MASS INDEX: 31.34 KG/M2 | RESPIRATION RATE: 16 BRPM | OXYGEN SATURATION: 93 % | DIASTOLIC BLOOD PRESSURE: 86 MMHG | TEMPERATURE: 97.8 F | HEIGHT: 66 IN

## 2024-01-29 DIAGNOSIS — G60.0 CHARCOT-MARIE-TOOTH DISEASE: ICD-10-CM

## 2024-01-29 DIAGNOSIS — I10 HYPERTENSION, UNSPECIFIED TYPE: ICD-10-CM

## 2024-01-29 DIAGNOSIS — E83.110 HEMOCHROMATOSIS ASSOCIATED WITH COMPOUND HETEROZYGOUS MUTATION IN HFE GENE: Primary | ICD-10-CM

## 2024-01-29 DIAGNOSIS — E83.110 HEMOCHROMATOSIS ASSOCIATED WITH COMPOUND HETEROZYGOUS MUTATION IN HFE GENE: ICD-10-CM

## 2024-01-29 LAB
ALBUMIN SERPL-MCNC: 4.3 G/DL (ref 3.5–5.2)
ALBUMIN/GLOB SERPL: 1.7 G/DL
ALP SERPL-CCNC: 46 U/L (ref 39–117)
ALT SERPL W P-5'-P-CCNC: 22 U/L (ref 1–41)
ANION GAP SERPL CALCULATED.3IONS-SCNC: 9 MMOL/L (ref 5–15)
AST SERPL-CCNC: 18 U/L (ref 1–40)
BASOPHILS # BLD AUTO: 0.05 10*3/MM3 (ref 0–0.2)
BASOPHILS NFR BLD AUTO: 0.8 % (ref 0–1.5)
BILIRUB SERPL-MCNC: 0.3 MG/DL (ref 0–1.2)
BUN SERPL-MCNC: 23 MG/DL (ref 6–20)
BUN/CREAT SERPL: 23.2 (ref 7–25)
CALCIUM SPEC-SCNC: 8.8 MG/DL (ref 8.6–10.5)
CHLORIDE SERPL-SCNC: 101 MMOL/L (ref 98–107)
CO2 SERPL-SCNC: 32 MMOL/L (ref 22–29)
CREAT SERPL-MCNC: 0.99 MG/DL (ref 0.76–1.27)
DEPRECATED RDW RBC AUTO: 41.8 FL (ref 37–54)
EGFRCR SERPLBLD CKD-EPI 2021: 88.9 ML/MIN/1.73
EOSINOPHIL # BLD AUTO: 0.37 10*3/MM3 (ref 0–0.4)
EOSINOPHIL NFR BLD AUTO: 5.9 % (ref 0.3–6.2)
ERYTHROCYTE [DISTWIDTH] IN BLOOD BY AUTOMATED COUNT: 12.7 % (ref 12.3–15.4)
FERRITIN SERPL-MCNC: 138.8 NG/ML (ref 30–400)
GLOBULIN UR ELPH-MCNC: 2.6 GM/DL
GLUCOSE SERPL-MCNC: 87 MG/DL (ref 65–99)
HCT VFR BLD AUTO: 44.7 % (ref 37.5–51)
HGB BLD-MCNC: 15 G/DL (ref 13–17.7)
HOLD SPECIMEN: NORMAL
IMM GRANULOCYTES # BLD AUTO: 0.03 10*3/MM3 (ref 0–0.05)
IMM GRANULOCYTES NFR BLD AUTO: 0.5 % (ref 0–0.5)
LYMPHOCYTES # BLD AUTO: 1.37 10*3/MM3 (ref 0.7–3.1)
LYMPHOCYTES NFR BLD AUTO: 21.7 % (ref 19.6–45.3)
MCH RBC QN AUTO: 30.5 PG (ref 26.6–33)
MCHC RBC AUTO-ENTMCNC: 33.6 G/DL (ref 31.5–35.7)
MCV RBC AUTO: 91 FL (ref 79–97)
MONOCYTES # BLD AUTO: 0.83 10*3/MM3 (ref 0.1–0.9)
MONOCYTES NFR BLD AUTO: 13.1 % (ref 5–12)
NEUTROPHILS NFR BLD AUTO: 3.67 10*3/MM3 (ref 1.7–7)
NEUTROPHILS NFR BLD AUTO: 58 % (ref 42.7–76)
NRBC BLD AUTO-RTO: 0 /100 WBC (ref 0–0.2)
PLATELET # BLD AUTO: 213 10*3/MM3 (ref 140–450)
PMV BLD AUTO: 10 FL (ref 6–12)
POTASSIUM SERPL-SCNC: 3.8 MMOL/L (ref 3.5–5.2)
PROT SERPL-MCNC: 6.9 G/DL (ref 6–8.5)
RBC # BLD AUTO: 4.91 10*6/MM3 (ref 4.14–5.8)
SODIUM SERPL-SCNC: 142 MMOL/L (ref 136–145)
WBC NRBC COR # BLD AUTO: 6.32 10*3/MM3 (ref 3.4–10.8)

## 2024-01-29 PROCEDURE — 85025 COMPLETE CBC W/AUTO DIFF WBC: CPT

## 2024-01-29 PROCEDURE — 36415 COLL VENOUS BLD VENIPUNCTURE: CPT

## 2024-01-29 PROCEDURE — 82728 ASSAY OF FERRITIN: CPT

## 2024-01-29 PROCEDURE — 80053 COMPREHEN METABOLIC PANEL: CPT

## 2024-01-29 NOTE — PROGRESS NOTES
[facility] HEMATOLOGY & ONCOLOGY  [address]  [place] [address] [phone]    Patient Name: Iam Gibbs  Encounter Date: 01/29/2024  YOB: 1965  Patient Number: [id number]    Hematology / Oncology Progress Note    HPI / REASON FOR VISIT: Iam Gibbs is a 57 y.o. male who is followed by this office for elevated iron with Hemochromatosis heterozygous with one copy of C282Y AND one copy of S65C.   He is being treated with therapeutic phlebotomy q 2 weeks until his ferritin is <50.  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       His LFT have been normal although he does drink beer regularly.  Low dose CT of chest on 01/03/22 showed no acute processes in the upper abdomen.  May 26, 2022 CT Abdomen showed 1. Fatty infiltration of the liver. 2. Mild splenomegaly.  3. Atheromatous disease of the aortoiliac vessels. Degenerative changes of the visualized spine.  He was referred to Cardiology and saw Dr. Jean on June 9, 2022.  He will have echo and follow-up at that office later this month.    INTERVAL HISTORY  He presents to clinic today for continued follow-up.  His last phlebotomy was June 27, 2023  for ferritin 54.   He has tolerated procedures well without any signs and symptoms of hypovolemia or  Hypotension.    He has no acute complaints today.     He had labs drawn today and results were reviewed with him in office.       LABS    Lab Results - Last 18 Months   Lab Units 01/29/24  1231 06/27/23  1040 03/28/23  0919 01/18/23  0944 11/29/22  0920 08/11/22  1228   HEMOGLOBIN g/dL 15.0 16.9 16.2 15.8 16.7 15.1   HEMATOCRIT % 44.7 52.1* 49.6 47.8 51.8* 45.9   MCV fL 91.0 92.5 90.8 89.5 89.5 90.9   WBC 10*3/mm3 6.32 7.09 7.32 6.06 7.67 5.93   RDW % 12.7 12.6 13.0 13.2 13.5 12.4   MPV fL 10.0 10.4 10.3 10.4 9.8 9.9  "  PLATELETS 10*3/mm3 213 234 241 239 247 259   IMM GRAN % % 0.5 0.6* 0.8* 0.3  --   --    NEUTROS ABS 10*3/mm3 3.67 4.35 4.33 3.76  --   --    LYMPHS ABS 10*3/mm3 1.37 1.50 1.49 1.17  --   --    MONOS ABS 10*3/mm3 0.83 0.78 0.99* 0.75  --   --    EOS ABS 10*3/mm3 0.37 0.35 0.38 0.28  --   --    BASOS ABS 10*3/mm3 0.05 0.07 0.07 0.08  --   --    IMMATURE GRANS (ABS) 10*3/mm3 0.03 0.04 0.06* 0.02  --   --    NRBC /100 WBC 0.0 0.0 0.0 0.0  --   --        Lab Results - Last 18 Months   Lab Units 01/29/24  1231 06/27/23  1040 03/28/23  0919 11/29/22  0920   GLUCOSE mg/dL 87 119* 99 96   SODIUM mmol/L 142 139 141 137   POTASSIUM mmol/L 3.8 4.7 3.8 4.1   CO2 mmol/L 32.0* 29.0 32.0* 31.0*   CHLORIDE mmol/L 101 98 98 98   ANION GAP mmol/L 9.0 12.0 11.0 8.0   CREATININE mg/dL 0.99 1.05 1.01 0.96   BUN mg/dL 23* 23* 20 20   BUN / CREAT RATIO  23.2 21.9 19.8 20.8   CALCIUM mg/dL 8.8 9.5 9.6 9.2   ALK PHOS U/L 46 55 55 54   TOTAL PROTEIN g/dL 6.9 7.6 7.4 7.0   ALT (SGPT) U/L 22 31 30 24   AST (SGOT) U/L 18 19 22 16   BILIRUBIN mg/dL 0.3 0.4 0.3 0.4   ALBUMIN g/dL 4.3 4.7 4.7 4.30   GLOBULIN gm/dL 2.6 2.9 2.7 2.7       No results for input(s): \"MSPIKE\", \"KAPPALAMB\", \"IGLFLC\", \"URICACID\", \"FREEKAPPAL\", \"CEA\", \"LDH\", \"REFLABREPO\" in the last 38720 hours.      Lab Results - Last 18 Months   Lab Units 01/29/24  1231 06/27/23  1040 03/28/23  0919 01/18/23  0944 11/29/22  0920 08/11/22  1228   FERRITIN ng/mL 138.80 54.69 87.22 43.89 87.11 86.27         PAST MEDICAL HISTORY:  ALLERGIES:  Allergies   Allergen Reactions    Adhesive Tape Rash     VERY SENSITIVE TO ADHESIVE     Latex Rash and Unknown - High Severity     CURRENT MEDICATIONS:  Outpatient Encounter Medications as of 1/29/2024   Medication Sig Dispense Refill    acetaminophen (TYLENOL) 500 MG tablet Take 1 tablet by mouth Every 6 (Six) Hours As Needed for Mild Pain.      albuterol (PROVENTIL) (2.5 MG/3ML) 0.083% nebulizer solution Take 2.5 mg by nebulization 3 (Three) Times a " Day. 90 each 1    allopurinol (ZYLOPRIM) 100 MG tablet TAKE ONE (1) TABLET BY MOUTH DAILY. 90 tablet 10    Azelastine HCl 137 MCG/SPRAY solution 2 sprays into the nostril(s) as directed by provider 2 (Two) Times a Day. 60 mL 5    Budeson-Glycopyrrol-Formoterol (Breztri Aerosphere) 160-9-4.8 MCG/ACT aerosol inhaler Inhale 2 puffs 2 (Two) Times a Day. 10.7 g 10    buPROPion XL (WELLBUTRIN XL) 150 MG 24 hr tablet TAKE ONE (1) TABLET BY MOUTH DAILY. 90 tablet 10    busPIRone (BUSPAR) 15 MG tablet TAKE 1/2 (ONE-HALF) TABLET BY MOUTH 2 (TWO) TIMES A DAY. 30 tablet 3    fluticasone (FLONASE) 50 MCG/ACT nasal spray 2 sprays into the nostril(s) as directed by provider Daily. 16 g 5    gemfibrozil (LOPID) 600 MG tablet TAKE ONE (1) TABLET BY MOUTH DAILY. NEEDS OFFICE APPT. 90 tablet 4    hydroCHLOROthiazide (HYDRODIURIL) 25 MG tablet TAKE ONE (1) TABLET BY MOUTH DAILY 90 tablet 10    ibuprofen (ADVIL,MOTRIN) 600 MG tablet Take 1 tablet by mouth Every 6 (Six) Hours As Needed for Mild Pain.      loratadine (CLARITIN) 10 MG tablet Take 1 tablet by mouth Daily. 90 tablet 3    losartan (COZAAR) 25 MG tablet TAKE ONE (1) TABLET BY MOUTH DAILY. 90 tablet 4    nystatin (MYCOSTATIN) 100,000 unit/mL suspension Swish and swallow 5 mL 4 (Four) Times a Day. 120 mL 0    pantoprazole (PROTONIX) 40 MG EC tablet TAKE ONE (1) TABLET BY MOUTH DAILY 90 tablet 3    tadalafil (Cialis) 20 MG tablet Take 1 tablet by mouth Daily As Needed for Erectile Dysfunction. 30 tablet 1    Ventolin  (90 Base) MCG/ACT inhaler INHALE 2 PUFFS EVERY 4 (FOUR) HOURS AS NEEDED FOR WHEEZING OR SHORTNESS OF AIR. 18 g 5    [DISCONTINUED] predniSONE (DELTASONE) 10 MG tablet Two twice a day for 3 days then two a day for 3 days then one a day for 7 days (Patient not taking: Reported on 1/29/2024) 25 tablet 0     No facility-administered encounter medications on file as of 1/29/2024.     ADULT ILLNESSES:  Patient Active Problem List   Diagnosis Code     Stridor-infrequent R06.1    Jelani v cord paresis (resser) ([place]) J38.02    Charcot-Claire-Tooth disease G60.0    Gastroesophageal reflux disease K21.9    Personal history of nicotine dependence Z87.891    Chronic neck pain M54.2, G89.29    Spinal stenosis in cervical region M48.02    Cervical radiculopathy M54.12    Chronic right shoulder pain M25.511, G89.29    Wellness examination Z00.00    Gout M10.9    History of chronic respiratory failure Z87.09    Gait difficulty R26.9    Laboratory test Z01.89    Hypertriglyceridemia E78.1    *Hx iron deficiency E61.1    Hyperuricemia-zyloprim E79.0    Heme positive stool R19.5    DARRIN (obstructive sleep apnea) G47.33    Hypertension I10    High serum ferritin-see hemachromatosis R79.89    History of COVID-19 Z86.16    Carrier of hemochromatosis HFE gene mutation (offer referral) Z14.8    Fatty liver K76.0    Coronary artery calcification on CT chest  I25.10, I25.84    Family history of early CAD grandmother  Z82.49    Other hemochromatosis E83.118    Elevated fasting glucose R73.01    Erectile dysfunction N52.9    COPD (chronic obstructive pulmonary disease) J44.9    Non-seasonal allergic rhinitis J30.89    Obesity (BMI 30-39.9) E66.9    Other specified anxiety disorders F41.8     SURGERIES:  Past Surgical History:   Procedure Laterality Date    BICEPS TENDONESIS SUBPECTORALIS REPAIR Right 6/28/2019    Procedure: BICEPS TENODESIS - RIGHT;  Surgeon: Yeison Agudelo MD;  Location: [facility] OR;  Service: Orthopedics    COLONOSCOPY N/A 2/17/2020    Procedure: COLONOSCOPY WITH ANESTHESIA;  Surgeon: Cal Vela DO;  Location: [facility] ENDOSCOPY;  Service: Gastroenterology;  Laterality: N/A;  preop; blood in stool  postop; polyps   PCP anand Jackson     ENDOSCOPY N/A 2/25/2020    Procedure: ESOPHAGOGASTRODUODENOSCOPY WITH ANESTHESIA;  Surgeon: Cal Vela DO;  Location: [facility] ENDOSCOPY;  Service: Gastroenterology;  Laterality: N/A;  preop; blood in stool  postop; normal    PCP Nick Jackson     SHOULDER ARTHROSCOPY W/ ROTATOR CUFF REPAIR Right 2019    Procedure: RIGHT SHOULDER ARTHROSCOPIC ROTATOR CUFF REPAIR, SUBACROMIAL DECOMPRESSION;  Surgeon: Yeison Agudelo MD;  Location: [facility];  Service: Orthopedics     HEALTH MAINTENANCE ITEMS:  Health Maintenance Due   Topic Date Due    TDAP/TD VACCINES (1 - Tdap) Never done    ZOSTER VACCINE (1 of 2) Never done    ANNUAL WELLNESS VISIT  Never done    LIPID PANEL  2023    INFLUENZA VACCINE  Never done    COVID-19 Vaccine (3 - 2023-24 season) 2023       <no information>  Last Completed Colonoscopy            COLORECTAL CANCER SCREENING (COLONOSCOPY - Every 5 Years) Next due on 2020  Surgical Procedure: COLONOSCOPY    2020  COLONOSCOPY    2020  POCT Occult blood x 3, stool                  Immunization History   Administered Date(s) Administered    COVID-19 (MODERNA) 1st,2nd,3rd Dose Monovalent 2021, 2021    Pneumococcal Conjugate 20-Valent (PCV20) 2023     Last Completed Mammogram       This patient has no relevant Health Maintenance data.              FAMILY HISTORY:  Family History   Problem Relation Age of Onset    Cancer Mother     Diabetes Mother     Colon polyps Mother     Hyperlipidemia Father     Hypertension Father     Cancer Father     Colon cancer Neg Hx      SOCIAL HISTORY:  Social History     Socioeconomic History    Marital status: Single    Years of education: 10    Highest education level: GED or equivalent   Tobacco Use    Smoking status: Former     Packs/day: 1.00     Years: 32.00     Additional pack years: 0.00     Total pack years: 32.00     Types: Cigars, Cigarettes     Start date:      Quit date: 10/2015     Years since quittin.3    Smokeless tobacco: Former     Quit date:    Vaping Use    Vaping Use: Never used   Substance and Sexual Activity    Alcohol use: Yes     Alcohol/week: 3.0 standard drinks of alcohol     Types: 3 Cans of beer per  "week     Comment: weekly    Drug use: Yes     Types: Marijuana    Sexual activity: Yes     Partners: Female       REVIEW OF SYSTEMS:  Review of Systems   Constitutional:  Positive for fatigue. Negative for activity change, appetite change, fever, unexpected weight gain and unexpected weight loss.   HENT:  Negative for dental problem, facial swelling, swollen glands and trouble swallowing.    Eyes:  Negative for double vision and discharge.   Respiratory:  Positive for shortness of breath. Negative for cough and wheezing.    Cardiovascular:  Negative for chest pain, palpitations and leg swelling.   Gastrointestinal:  Negative for abdominal pain, blood in stool, nausea and vomiting.   Endocrine: Negative.    Genitourinary:  Negative for dysuria and hematuria.   Musculoskeletal:  Positive for gait problem. Negative for arthralgias and myalgias.   Skin:  Negative for rash, skin lesions and wound.   Allergic/Immunologic: Negative for immunocompromised state.   Neurological:  Negative for speech difficulty, light-headedness, headache, memory problem and confusion.   Hematological:  Negative for adenopathy.   Psychiatric/Behavioral:  Negative for self-injury, suicidal ideas and depressed mood. The patient is not nervous/anxious.          /86   Pulse 94   Temp 97.8 °F (36.6 °C)   Resp 16   Ht 167.6 cm (66\")   Wt 88.5 kg (195 lb)   SpO2 93%   BMI 31.47 kg/m²  Body surface area is 1.98 meters squared.       Physical Exam  Constitutional:       Appearance: Normal appearance.   HENT:      Head: Normocephalic and atraumatic.   Eyes:      Extraocular Movements: Extraocular movements intact.   Cardiovascular:      Rate and Rhythm: Normal rate and regular rhythm.   Pulmonary:      Effort: Pulmonary effort is normal.      Breath sounds: Normal breath sounds.   Abdominal:      General: Abdomen is flat.      Palpations: Abdomen is soft.   Musculoskeletal:      Comments: Abnormal gait r/t CMT.  Uses cane for " ambulation  Contracted hands     Skin:     General: Skin is warm and dry.   Neurological:      General: No focal deficit present.      Mental Status: He is alert and oriented to person, place, and time.      Deep Tendon Reflexes: Reflexes normal.         Iam Gibbs reports a pain score of 0. No intervention indicated.        ASSESSMENT / PLAN    1. Hemochromatosis associated with compound heterozygous mutation in HFE gene    2. Hypertension, unspecified type    3. Charcot-Claire-Tooth disease              ASSESSMENT:   1.  Hemochromatosis   -Pt has compound Heterozygous Hemochromotosis one copy of C282Y and one copy of S65C.  -He also has Charcot-Claire Tooth Disease.  -Liver Function Tests have been normal.  -Ferritin 138  Hgb 15.0, Hct 44.7.  -Goal is Ferritin < 50  -Will continue with phlebotomy today for 500 cc whole blood.       2. Hypertension  -BP stable today  122/82  -Pt is taking HCTZ, and Losartan  -Managed by PCP    3.  Charcot-Claire-Tooth Disease  -Abnormal gait, contracted hands  -Managed by neurology     PLAN:  -We will perform therapeutic phlebotomy today.    -500 cc whole blood was removed.  Pt tolerated phlebotomy well w/o s/s of hypovolemia or hypotension.   -We will continue to perform phlebotomy every 3 months for ferritin > 50.    - He will RTC for visit with provider in 6 months. Pre office labs for CBC, CMP, Ferritin   -He will continue all medications and treatment plans per PCP and other providers.   -Care discussed with patient.  Understanding expressed.  Patient agreeable with plan.    PHLEBOTOMY TODAY        Nat Paez, RUEL  01/29/2024

## 2024-01-29 NOTE — LETTER
January 29, 2024       No Recipients    Patient: Iam Gibbs   YOB: 1965   Date of Visit: 1/29/2024       Dear Nick Jackson MD    Iam Gibbs was in my office today. Below is a copy of my note.    If you have questions, please do not hesitate to call me. I look forward to following Iam along with you.         Sincerely,        RUEL Deutsch        CC:   No Recipients    [facility] H[facility]AT[facility] & [facility]  [address]  [place] [address]  [phone]    Patient Name: Iam Gibbs  Encounter Date: 01/29/2024  YOB: 1965  Patient Number: [id number]    Hematology / Oncology Progress Note    HPI / REASON FOR VISIT: Iam Gibbs is a 57 y.o. male who is followed by this office for elevated iron with Hemochromatosis heterozygous with one copy of C282Y AND one copy of S65C.   He is being treated with therapeutic phlebotomy q 2 weeks until his ferritin is <50.  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       His LFT have been normal although he does drink beer regularly.  Low dose CT of chest on 01/03/22 showed no acute processes in the upper abdomen.  May 26, 2022 CT Abdomen showed 1. Fatty infiltration of the liver. 2. Mild splenomegaly.  3. Atheromatous disease of the aortoiliac vessels. Degenerative changes of the visualized spine.  He was referred to Cardiology and saw Dr. Jean on June 9, 2022.  He will have echo and follow-up at that office later this month.    INTERVAL HISTORY  He presents to clinic today for continued follow-up.  His last phlebotomy was June 27, 2023  for ferritin 54.   He has tolerated procedures well without any signs and symptoms of hypovolemia or  Hypotension.    He has no acute complaints today.     He had labs drawn today and results were reviewed with  "him in office.       LABS    Lab Results - Last 18 Months   Lab Units 01/29/24  1231 06/27/23  1040 03/28/23  0919 01/18/23  0944 11/29/22  0920 08/11/22  1228   HEMOGLOBIN g/dL 15.0 16.9 16.2 15.8 16.7 15.1   HEMATOCRIT % 44.7 52.1* 49.6 47.8 51.8* 45.9   MCV fL 91.0 92.5 90.8 89.5 89.5 90.9   WBC 10*3/mm3 6.32 7.09 7.32 6.06 7.67 5.93   RDW % 12.7 12.6 13.0 13.2 13.5 12.4   MPV fL 10.0 10.4 10.3 10.4 9.8 9.9   PLATELETS 10*3/mm3 213 234 241 239 247 259   IMM GRAN % % 0.5 0.6* 0.8* 0.3  --   --    NEUTROS ABS 10*3/mm3 3.67 4.35 4.33 3.76  --   --    LYMPHS ABS 10*3/mm3 1.37 1.50 1.49 1.17  --   --    MONOS ABS 10*3/mm3 0.83 0.78 0.99* 0.75  --   --    EOS ABS 10*3/mm3 0.37 0.35 0.38 0.28  --   --    BASOS ABS 10*3/mm3 0.05 0.07 0.07 0.08  --   --    IMMATURE GRANS (ABS) 10*3/mm3 0.03 0.04 0.06* 0.02  --   --    NRBC /100 WBC 0.0 0.0 0.0 0.0  --   --        Lab Results - Last 18 Months   Lab Units 01/29/24  1231 06/27/23  1040 03/28/23  0919 11/29/22  0920   GLUCOSE mg/dL 87 119* 99 96   SODIUM mmol/L 142 139 141 137   POTASSIUM mmol/L 3.8 4.7 3.8 4.1   CO2 mmol/L 32.0* 29.0 32.0* 31.0*   CHLORIDE mmol/L 101 98 98 98   ANION GAP mmol/L 9.0 12.0 11.0 8.0   CREATININE mg/dL 0.99 1.05 1.01 0.96   BUN mg/dL 23* 23* 20 20   BUN / CREAT RATIO  23.2 21.9 19.8 20.8   CALCIUM mg/dL 8.8 9.5 9.6 9.2   ALK PHOS U/L 46 55 55 54   TOTAL PROTEIN g/dL 6.9 7.6 7.4 7.0   ALT (SGPT) U/L 22 31 30 24   AST (SGOT) U/L 18 19 22 16   BILIRUBIN mg/dL 0.3 0.4 0.3 0.4   ALBUMIN g/dL 4.3 4.7 4.7 4.30   GLOBULIN gm/dL 2.6 2.9 2.7 2.7       No results for input(s): \"MSPIKE\", \"KAPPALAMB\", \"IGLFLC\", \"URICACID\", \"FREEKAPPAL\", \"CEA\", \"LDH\", \"REFLABREPO\" in the last 47411 hours.      Lab Results - Last 18 Months   Lab Units 01/29/24  1231 06/27/23  1040 03/28/23  0919 01/18/23  0944 11/29/22  0920 08/11/22  1228   FERRITIN ng/mL 138.80 54.69 87.22 43.89 87.11 86.27         PAST MEDICAL HISTORY:  ALLERGIES:  Allergies   Allergen Reactions   • Adhesive " Tape Rash     VERY SENSITIVE TO ADHESIVE    • Latex Rash and Unknown - High Severity     CURRENT MEDICATIONS:  Outpatient Encounter Medications as of 1/29/2024   Medication Sig Dispense Refill   • acetaminophen (TYLENOL) 500 MG tablet Take 1 tablet by mouth Every 6 (Six) Hours As Needed for Mild Pain.     • albuterol (PROVENTIL) (2.5 MG/3ML) 0.083% nebulizer solution Take 2.5 mg by nebulization 3 (Three) Times a Day. 90 each 1   • allopurinol (ZYLOPRIM) 100 MG tablet TAKE ONE (1) TABLET BY MOUTH DAILY. 90 tablet 10   • Azelastine HCl 137 MCG/SPRAY solution 2 sprays into the nostril(s) as directed by provider 2 (Two) Times a Day. 60 mL 5   • Budeson-Glycopyrrol-Formoterol (Breztri Aerosphere) 160-9-4.8 MCG/ACT aerosol inhaler Inhale 2 puffs 2 (Two) Times a Day. 10.7 g 10   • buPROPion XL (WELLBUTRIN XL) 150 MG 24 hr tablet TAKE ONE (1) TABLET BY MOUTH DAILY. 90 tablet 10   • busPIRone (BUSPAR) 15 MG tablet TAKE 1/2 (ONE-HALF) TABLET BY MOUTH 2 (TWO) TIMES A DAY. 30 tablet 3   • fluticasone (FLONASE) 50 MCG/ACT nasal spray 2 sprays into the nostril(s) as directed by provider Daily. 16 g 5   • gemfibrozil (LOPID) 600 MG tablet TAKE ONE (1) TABLET BY MOUTH DAILY. NEEDS OFFICE APPT. 90 tablet 4   • hydroCHLOROthiazide (HYDRODIURIL) 25 MG tablet TAKE ONE (1) TABLET BY MOUTH DAILY 90 tablet 10   • ibuprofen (ADVIL,MOTRIN) 600 MG tablet Take 1 tablet by mouth Every 6 (Six) Hours As Needed for Mild Pain.     • loratadine (CLARITIN) 10 MG tablet Take 1 tablet by mouth Daily. 90 tablet 3   • losartan (COZAAR) 25 MG tablet TAKE ONE (1) TABLET BY MOUTH DAILY. 90 tablet 4   • nystatin (MYCOSTATIN) 100,000 unit/mL suspension Swish and swallow 5 mL 4 (Four) Times a Day. 120 mL 0   • pantoprazole (PROTONIX) 40 MG EC tablet TAKE ONE (1) TABLET BY MOUTH DAILY 90 tablet 3   • tadalafil (Cialis) 20 MG tablet Take 1 tablet by mouth Daily As Needed for Erectile Dysfunction. 30 tablet 1   • Ventolin  (90 Base) MCG/ACT inhaler INHALE  2 PUFFS EVERY 4 (FOUR) HOURS AS NEEDED FOR WHEEZING OR SHORTNESS OF AIR. 18 g 5   • [DISCONTINUED] predniSONE (DELTASONE) 10 MG tablet Two twice a day for 3 days then two a day for 3 days then one a day for 7 days (Patient not taking: Reported on 1/29/2024) 25 tablet 0     No facility-administered encounter medications on file as of 1/29/2024.     ADULT ILLNESSES:  Patient Active Problem List   Diagnosis Code   • Stridor-infrequent R06.1   • Jelani v cord paresis (resser) ([place]) J38.02   • Charcot-Claire-Tooth disease G60.0   • Gastroesophageal reflux disease K21.9   • Personal history of nicotine dependence Z87.891   • Chronic neck pain M54.2, G89.29   • Spinal stenosis in cervical region M48.02   • Cervical radiculopathy M54.12   • Chronic right shoulder pain M25.511, G89.29   • Wellness examination Z00.00   • Gout M10.9   • History of chronic respiratory failure Z87.09   • Gait difficulty R26.9   • Laboratory test Z01.89   • Hypertriglyceridemia E78.1   • *Hx iron deficiency E61.1   • Hyperuricemia-zyloprim E79.0   • Heme positive stool R19.5   • DARRIN (obstructive sleep apnea) G47.33   • Hypertension I10   • High serum ferritin-see hemachromatosis R79.89   • History of COVID-19 Z86.16   • Carrier of hemochromatosis HFE gene mutation (offer referral) Z14.8   • Fatty liver K76.0   • Coronary artery calcification on CT chest  I25.10, I25.84   • Family history of early CAD grandmother  Z82.49   • Other hemochromatosis E83.118   • Elevated fasting glucose R73.01   • Erectile dysfunction N52.9   • COPD (chronic obstructive pulmonary disease) J44.9   • Non-seasonal allergic rhinitis J30.89   • Obesity (BMI 30-39.9) E66.9   • Other specified anxiety disorders F41.8     SURGERIES:  Past Surgical History:   Procedure Laterality Date   • BICEPS TENDONESIS SUBPECTORALIS REPAIR Right 6/28/2019    Procedure: BICEPS TENODESIS - RIGHT;  Surgeon: Yeison Agudelo MD;  Location: [facility] OR;  Service: Orthopedics   • COLONOSCOPY N/A  2/17/2020    Procedure: COLONOSCOPY WITH ANESTHESIA;  Surgeon: Cal Vela DO;  Location: [facility] ENDOSCOPY;  Service: Gastroenterology;  Laterality: N/A;  preop; blood in stool  postop; polyps   PCP anand Jackson    • ENDOSCOPY N/A 2/25/2020    Procedure: ESOPHAGOGASTRODUODENOSCOPY WITH ANESTHESIA;  Surgeon: Cal Vela DO;  Location: [facility] ENDOSCOPY;  Service: Gastroenterology;  Laterality: N/A;  preop; blood in stool  postop; normal   PCP Anand Jackson    • SHOULDER ARTHROSCOPY W/ ROTATOR CUFF REPAIR Right 6/28/2019    Procedure: RIGHT SHOULDER ARTHROSCOPIC ROTATOR CUFF REPAIR, SUBACROMIAL DECOMPRESSION;  Surgeon: Yeison Agudelo MD;  Location: [facility] OR;  Service: Orthopedics     HEALTH MAINTENANCE ITEMS:  Health Maintenance Due   Topic Date Due   • TDAP/TD VACCINES (1 - Tdap) Never done   • ZOSTER VACCINE (1 of 2) Never done   • ANNUAL WELLNESS VISIT  Never done   • LIPID PANEL  07/05/2023   • INFLUENZA VACCINE  Never done   • COVID-19 Vaccine (3 - 2023-24 season) 09/01/2023       <no information>  Last Completed Colonoscopy            COLORECTAL CANCER SCREENING (COLONOSCOPY - Every 5 Years) Next due on 2/17/2025 02/17/2020  Surgical Procedure: COLONOSCOPY    02/17/2020  COLONOSCOPY    01/27/2020  POCT Occult blood x 3, stool                  Immunization History   Administered Date(s) Administered   • COVID-19 (MODERNA) 1st,2nd,3rd Dose Monovalent 07/06/2021, 08/06/2021   • Pneumococcal Conjugate 20-Valent (PCV20) 09/01/2023     Last Completed Mammogram       This patient has no relevant Health Maintenance data.              FAMILY HISTORY:  Family History   Problem Relation Age of Onset   • Cancer Mother    • Diabetes Mother    • Colon polyps Mother    • Hyperlipidemia Father    • Hypertension Father    • Cancer Father    • Colon cancer Neg Hx      SOCIAL HISTORY:  Social History     Socioeconomic History   • Marital status: Single   • Years of education: 10   • Highest education level: GED or  "equivalent   Tobacco Use   • Smoking status: Former     Packs/day: 1.00     Years: 32.00     Additional pack years: 0.00     Total pack years: 32.00     Types: Cigars, Cigarettes     Start date:      Quit date: 10/2015     Years since quittin.3   • Smokeless tobacco: Former     Quit date:    Vaping Use   • Vaping Use: Never used   Substance and Sexual Activity   • Alcohol use: Yes     Alcohol/week: 3.0 standard drinks of alcohol     Types: 3 Cans of beer per week     Comment: weekly   • Drug use: Yes     Types: Marijuana   • Sexual activity: Yes     Partners: Female       REVIEW OF SYSTEMS:  Review of Systems   Constitutional:  Positive for fatigue. Negative for activity change, appetite change, fever, unexpected weight gain and unexpected weight loss.   HENT:  Negative for dental problem, facial swelling, swollen glands and trouble swallowing.    Eyes:  Negative for double vision and discharge.   Respiratory:  Positive for shortness of breath. Negative for cough and wheezing.    Cardiovascular:  Negative for chest pain, palpitations and leg swelling.   Gastrointestinal:  Negative for abdominal pain, blood in stool, nausea and vomiting.   Endocrine: Negative.    Genitourinary:  Negative for dysuria and hematuria.   Musculoskeletal:  Positive for gait problem. Negative for arthralgias and myalgias.   Skin:  Negative for rash, skin lesions and wound.   Allergic/Immunologic: Negative for immunocompromised state.   Neurological:  Negative for speech difficulty, light-headedness, headache, memory problem and confusion.   Hematological:  Negative for adenopathy.   Psychiatric/Behavioral:  Negative for self-injury, suicidal ideas and depressed mood. The patient is not nervous/anxious.          /86   Pulse 94   Temp 97.8 °F (36.6 °C)   Resp 16   Ht 167.6 cm (66\")   Wt 88.5 kg (195 lb)   SpO2 93%   BMI 31.47 kg/m²  Body surface area is 1.98 meters squared.       Physical Exam  Constitutional:       " Appearance: Normal appearance.   HENT:      Head: Normocephalic and atraumatic.   Eyes:      Extraocular Movements: Extraocular movements intact.   Cardiovascular:      Rate and Rhythm: Normal rate and regular rhythm.   Pulmonary:      Effort: Pulmonary effort is normal.      Breath sounds: Normal breath sounds.   Abdominal:      General: Abdomen is flat.      Palpations: Abdomen is soft.   Musculoskeletal:      Comments: Abnormal gait r/t CMT.  Uses cane for ambulation  Contracted hands     Skin:     General: Skin is warm and dry.   Neurological:      General: No focal deficit present.      Mental Status: He is alert and oriented to person, place, and time.      Deep Tendon Reflexes: Reflexes normal.         Iam Raoul Bernie reports a pain score of 0. No intervention indicated.        ASSESSMENT / PLAN    1. Hemochromatosis associated with compound heterozygous mutation in HFE gene    2. Hypertension, unspecified type    3. Charcot-Claire-Tooth disease              ASSESSMENT:   1.  Hemochromatosis   -Pt has compound Heterozygous Hemochromotosis one copy of C282Y and one copy of S65C.  -He also has Charcot-Claire Tooth Disease.  -Liver Function Tests have been normal.  -Ferritin 138  Hgb 15.0, Hct 44.7.  -Goal is Ferritin < 50  -Will continue with phlebotomy today for 500 cc whole blood.       2. Hypertension  -BP stable today  122/82  -Pt is taking HCTZ, and Losartan  -Managed by PCP    3.  Charcot-Claire-Tooth Disease  -Abnormal gait, contracted hands  -Managed by neurology     PLAN:  -We will perform therapeutic phlebotomy today.    -500 cc whole blood was removed.  Pt tolerated phlebotomy well w/o s/s of hypovolemia or hypotension.   -We will continue to perform phlebotomy every 3 months for ferritin > 50.    - He will RTC for visit with provider in 6 months. Pre office labs for CBC, CMP, Ferritin   -He will continue all medications and treatment plans per PCP and other providers.   -Care discussed with  patient.  Understanding expressed.  Patient agreeable with plan.    PHLEBOTOMY TODAY        Nat Paez, APRN  01/29/2024

## 2024-03-29 ENCOUNTER — HOSPITAL ENCOUNTER (OUTPATIENT)
Dept: CT IMAGING | Facility: HOSPITAL | Age: 59
Discharge: HOME | End: 2024-03-29
Payer: MEDICARE

## 2024-03-29 DIAGNOSIS — Z87.891 PERSONAL HISTORY OF NICOTINE DEPENDENCE: ICD-10-CM

## 2024-03-29 PROCEDURE — 71271 CT THORAX LUNG CANCER SCR C-: CPT

## 2024-04-01 ENCOUNTER — OFFICE VISIT (OUTPATIENT)
Dept: PULMONOLOGY | Facility: CLINIC | Age: 59
End: 2024-04-01
Payer: MEDICARE

## 2024-04-01 VITALS
HEIGHT: 67 IN | HEART RATE: 111 BPM | DIASTOLIC BLOOD PRESSURE: 90 MMHG | SYSTOLIC BLOOD PRESSURE: 146 MMHG | BODY MASS INDEX: 30.2 KG/M2 | WEIGHT: 192.4 LBS | OXYGEN SATURATION: 94 %

## 2024-04-01 DIAGNOSIS — J44.9 CHRONIC OBSTRUCTIVE PULMONARY DISEASE, UNSPECIFIED COPD TYPE: ICD-10-CM

## 2024-04-01 DIAGNOSIS — J44.9 CHRONIC OBSTRUCTIVE PULMONARY DISEASE, UNSPECIFIED COPD TYPE: Primary | ICD-10-CM

## 2024-04-01 DIAGNOSIS — F41.8 OTHER SPECIFIED ANXIETY DISORDERS: ICD-10-CM

## 2024-04-01 DIAGNOSIS — Z86.16 HISTORY OF COVID-19: ICD-10-CM

## 2024-04-01 DIAGNOSIS — G47.33 OSA (OBSTRUCTIVE SLEEP APNEA): ICD-10-CM

## 2024-04-01 DIAGNOSIS — Z87.891 FORMER SMOKER: ICD-10-CM

## 2024-04-01 DIAGNOSIS — J98.4 RESTRICTIVE LUNG DISEASE: ICD-10-CM

## 2024-04-01 DIAGNOSIS — J30.89 NON-SEASONAL ALLERGIC RHINITIS, UNSPECIFIED TRIGGER: ICD-10-CM

## 2024-04-01 DIAGNOSIS — E66.9 OBESITY (BMI 30-39.9): ICD-10-CM

## 2024-04-01 DIAGNOSIS — E83.118 OTHER HEMOCHROMATOSIS: ICD-10-CM

## 2024-04-01 DIAGNOSIS — G60.0 CHARCOT-MARIE-TOOTH DISEASE: ICD-10-CM

## 2024-04-01 DIAGNOSIS — Z87.09 HISTORY OF CHRONIC RESPIRATORY FAILURE: ICD-10-CM

## 2024-04-01 DIAGNOSIS — G47.34 NOCTURNAL HYPOXEMIA: ICD-10-CM

## 2024-04-01 NOTE — PROGRESS NOTES
[facility] OUTPATIENT PROGRESS NOTE    Patient: Iam Gibbs  : 1965  Age: 59 y.o.  Date of Service: 2024    REASON FOR CLINIC VISIT:  Chief Complaint   Patient presents with    COPD       Subjective:    History of Present Illness:  Iam Gibbs is a 59 y.o. male who presents to the office today to be seen for    Diagnosis Plan   1. Chronic obstructive pulmonary disease, unspecified COPD type        2. DARRIN (obstructive sleep apnea)        3. History of chronic respiratory failure        4. Nocturnal hypoxemia        5. Restrictive lung disease        6. Former smoker        7. History of COVID-19        8. Obesity (BMI 30-39.9)        9. Other hemochromatosis        10. Charcot-Claire-Tooth disease        11. Other specified anxiety disorders        12. Non-seasonal allergic rhinitis, unspecified trigger        .  Other problems per record.    History:    Patient is a very pleasant middle-aged  gentleman who was seen in the pulmonary clinic for follow-up visit.    Patient has supports metastatic disease and some neurological problem and walks with a cane.  He has obstructive sleep apnea but he does not use the CPAP and has noncompliance issues and using only oxygen 2 L at night.  His pulmonary function test done today showed he had moderate obstructive and restrictive dysfunction.  His diffusion capacity was within normal limits and corrected for alveolar volume.  The patient is currently using Breztri and albuterol nebulizer and rescue inhaler and for his nasal allergy he is using Astelin nasal spray, fluticasone nasal spray and loratadine.    He lives independently.  He is planned to go for a outdoor vacation for a few weeks.  He is up-to-date on his vaccinations.  He had no recent hospital admissions and ER visit and urgent care visit today as a new complaints.  He also had a CT scan of the chest done which showed no lung nodules and chronic changes in the  lung and hepatic steatosis also noted .    PFT done today:  Not done today    PFT Values          2024    13:30   Pre Drug PFT Results   FVC 72   FEV1 76   FEF 25-75% 92   FEV1/FVC 83   Other Tests PFT Results   TLC 69   RV 53   DLCO 104   D/VAsb 135     Results for orders placed in visit on 24    Spirometry with Diffusion Capacity & Lung Volumes    Narrative  Spirometry with Diffusion Capacity & Lung Volumes    Performed by: Cathy Borges, RRT  Authorized by: Shanice Hebert MD  Pre Drug % Predicted  FVC: 72%  FEV1: 76%  FEF 25-75%: 92%  FEV1/FVC: 83%  T%  RV: 53%  DLCO: 104%  D/VAsb: 135%    Interpretation  Overall comments:  The above test results are acceptable and repeatable by ATS criteria  From analysis of the above test results the patient showed evidence of moderate obstructive and restrictive dysfunction but obstructive dysfunction noted due to decrease in FEV1 and FEF 25-75% and resting dysfunction noted due to decrease in total lung capacity.  No bronchodilator challenge was done.  The patient had normal diffusion capacity corrected for alveolar volume.    Apparently had a pulmonary function test done a long time ago which is not available for comparison.  Clinical correlation was indicated.    Shanice Hebert MD  Pulmonologist/Intensivist  2024 14:39 CDT         Bronchodilator therapy: Breztri and Albuterol rescue inhaler    Smoking Status:   Social History     Tobacco Use   Smoking Status Former    Current packs/day: 0.00    Average packs/day: 1 pack/day for 32.7 years (32.7 ttl pk-yrs)    Types: Cigars, Cigarettes    Start date:     Quit date: 10/2015    Years since quittin.5    Passive exposure: Past   Smokeless Tobacco Former    Quit date:      Pulm Rehab: no  Sleep: yes on 2.5 LPM O2 at night.     Support System: lives alone    Code Status:   There are no questions and answers to display.        Review of Systems:  A complete review of systems is performed  and all other systems were reviewed and negative as note above in the HPI.  Review of Systems   Constitutional: Negative.  Positive for fatigue.   HENT:  Positive for congestion and nosebleeds.    Eyes: Negative.    Respiratory: Negative.  Positive for cough, chest tightness and shortness of breath.    Cardiovascular: Negative.    Gastrointestinal: Negative.    Endocrine: Negative.    Genitourinary: Negative.    Musculoskeletal:  Positive for arthralgias and back pain.   Skin: Negative.    Allergic/Immunologic: Negative.    Neurological: Negative.  Positive for weakness.   Hematological: Negative.    Psychiatric/Behavioral: Negative.         CAT/ACT Score:  Not done today    Medications:  Outpatient Encounter Medications as of 4/1/2024   Medication Sig Dispense Refill    acetaminophen (TYLENOL) 500 MG tablet Take 1 tablet by mouth Every 6 (Six) Hours As Needed for Mild Pain.      albuterol (PROVENTIL) (2.5 MG/3ML) 0.083% nebulizer solution Take 2.5 mg by nebulization 3 (Three) Times a Day. 90 each 1    allopurinol (ZYLOPRIM) 100 MG tablet TAKE ONE (1) TABLET BY MOUTH DAILY. 90 tablet 10    Azelastine HCl 137 MCG/SPRAY solution 2 sprays into the nostril(s) as directed by provider 2 (Two) Times a Day. 60 mL 5    Budeson-Glycopyrrol-Formoterol (Breztri Aerosphere) 160-9-4.8 MCG/ACT aerosol inhaler Inhale 2 puffs 2 (Two) Times a Day. 10.7 g 10    buPROPion XL (WELLBUTRIN XL) 150 MG 24 hr tablet TAKE ONE (1) TABLET BY MOUTH DAILY. 90 tablet 10    busPIRone (BUSPAR) 15 MG tablet TAKE 1/2 (ONE-HALF) TABLET BY MOUTH 2 (TWO) TIMES A DAY. 30 tablet 3    fluticasone (FLONASE) 50 MCG/ACT nasal spray 2 sprays into the nostril(s) as directed by provider Daily. 16 g 5    gemfibrozil (LOPID) 600 MG tablet TAKE ONE (1) TABLET BY MOUTH DAILY. NEEDS OFFICE APPT. 90 tablet 4    hydroCHLOROthiazide (HYDRODIURIL) 25 MG tablet TAKE ONE (1) TABLET BY MOUTH DAILY 90 tablet 10    ibuprofen (ADVIL,MOTRIN) 600 MG tablet Take 1 tablet by mouth  "Every 6 (Six) Hours As Needed for Mild Pain.      loratadine (CLARITIN) 10 MG tablet Take 1 tablet by mouth Daily. 90 tablet 3    losartan (COZAAR) 25 MG tablet TAKE ONE (1) TABLET BY MOUTH DAILY. 90 tablet 4    nystatin (MYCOSTATIN) 100,000 unit/mL suspension Swish and swallow 5 mL 4 (Four) Times a Day. 120 mL 0    pantoprazole (PROTONIX) 40 MG EC tablet TAKE ONE (1) TABLET BY MOUTH DAILY 90 tablet 3    tadalafil (Cialis) 20 MG tablet Take 1 tablet by mouth Daily As Needed for Erectile Dysfunction. 30 tablet 1    Ventolin  (90 Base) MCG/ACT inhaler INHALE 2 PUFFS EVERY 4 (FOUR) HOURS AS NEEDED FOR WHEEZING OR SHORTNESS OF AIR. 18 g 5     No facility-administered encounter medications on file as of 4/1/2024.       Allergies:  Allergies   Allergen Reactions    Adhesive Tape Rash     VERY SENSITIVE TO ADHESIVE     Latex Rash and Unknown - High Severity       Immunizations:  Immunization History   Administered Date(s) Administered    COVID-19 (MODERNA) 1st,2nd,3rd Dose Monovalent 07/06/2021, 08/06/2021    Pneumococcal Conjugate 20-Valent (PCV20) 09/01/2023       Objective:    Vitals:  /90   Pulse 111   Ht 168.9 cm (66.5\")   Wt 87.3 kg (192 lb 6.4 oz)   SpO2 94%   BMI 30.59 kg/m²     Physical Exam:  General: Patient is a 59 y.o. middle aged  male. Looks stated age. Appears to be in no acute distress.He walks with a cane.    Eyes: EOMI. PERRLA. Vision intact. No scleral icterus.  Ear, Nose, Mouth and Throat: Hearing is grossly intact. No Leukoplakia, pharyngitis, stomatitis or thrush. Swollen nasal mucosa with post nasal drop.  Neck: Range of motion of neck normal. No thyromegaly or masses. Mallampati Class 3  Respiratory: Clear to auscultation bilaterally. No use of accessory muscles. Decreased breath sounds.  Cardiovascular: Normal heart sounds. Regularly regular rhythm without murmur.  Gastrointestinal: Non tender, non distended, soft. Bowel sounds positive in all four quadrants. No " organomegaly.  Skin: No obvious rashes, lesions, ulcers or large amount of bruising. No edema.   Neurological: No new motor deficits. Cranial nerves appear intact.  Weakness noted  Psychiatric: Patient is alert and oriented to person, place and time.    Chest Imaging:      Study Result    Narrative & Impression   EXAM/TECHNIQUE: CT chest without contrast, low-dose protocol     INDICATION: Lung cancer screening, >= 20 pk-yr smoking history (Age >=  50y); Z87.891-Personal history of nicotine dependence     COMPARISON: 3/28/2023     DLP: 50.66 mGy.cm. Automated exposure control was also utilized to  decrease patient radiation dose.     FINDINGS:     The central airways are clear. Chronic linear atelectasis/scarring in  the superior segment of the left lower lobe. No consolidation or pleural  effusion. Mild centrilobular emphysema. No suspicious noncalcified  pulmonary nodule.     No enlarged thoracic lymph nodes. Main pulmonary artery is nondilated.  Thoracic aorta is nonaneurysmal. No pericardial effusion. Mild coronary  artery calcification.     No large thyroid nodule. No acute chest wall soft tissue abnormality.  The liver is steatotic. No suspicious osseous lesion. Mild degenerative  change in the thoracic spine.     IMPRESSION:  1.  No suspicious pulmonary nodule.   2.  Hepatic steatosis.     Lung-RADS 1: Negative   No nodules and definitely benign nodules.  Continue annual screening with low-dose CT in 12 months.        This report was signed and finalized on 3/29/2024 2:49 PM by Dr. Earl Owens MD.        Imaging    CT Chest Low Dose Cancer Screening WO (Order: [id number]) - 3/29/2024  Assessment:  1. Chronic obstructive pulmonary disease, unspecified COPD type    2. DARRIN (obstructive sleep apnea)    3. History of chronic respiratory failure    4. Nocturnal hypoxemia    5. Restrictive lung disease    6. Former smoker    7. History of COVID-19    8. Obesity (BMI 30-39.9)    9. Other hemochromatosis    10.  Charcot-Claire-Tooth disease    11. Other specified anxiety disorders    12. Non-seasonal allergic rhinitis, unspecified trigger        Plan/Recommendations:    I explained the scan results and PFT results with the patient.  He has moderate obstructive restrictive dysfunction and the CT scan did not show any definite lung nodule but chronic changes noted  We will get another follow-up CT scan in a year time is annual low-dose CT screening.  For his COPD will continue using Breztri albuterol nebulizer and rescue inhaler.  No prescription was needed.  3.  He has obstructive sleep apnea but is unable to tolerate the CPAP and not using it routinely but using oxygen 2 L at night which should be continued.  Patient will need to continue fluticasone nasal spray Astelin nasal spray and loratadine for nasal allergy.  Continue physical activity as tolerated.  Patient has some progressive neurologic weakness from the Charcot Claire tooth disease.  He is up-to-date on his vaccinations.  He is a former smoker and did quit smoking  He will return to pulmonary clinic for a follow-up visit in 6 months time or earlier if needed.    Follow up:  6 Months    Time Spent:  30 minutes    I appreciate the opportunity of participating in this patient's care. I would like to thank the PCP for the referral.  Please feel free to contact me with any other questions.    Shanice Hebert MD   Pulmonologist/Intensivist     Electronically signed by: Shanice Hebert MD, 4/1/2024 14:39 CDT

## 2024-04-01 NOTE — PROCEDURES
Spirometry with Diffusion Capacity & Lung Volumes    Performed by: Cathy Borges, RRT  Authorized by: Shanice Hebert MD     Pre Drug % Predicted    FVC: 72%   FEV1: 76%   FEF 25-75%: 92%   FEV1/FVC: 83%   T%   RV: 53%   DLCO: 104%   D/VAsb: 135%    Interpretation   Overall comments:   The above test results are acceptable and repeatable by ATS criteria  From analysis of the above test results the patient showed evidence of moderate obstructive and restrictive dysfunction but obstructive dysfunction noted due to decrease in FEV1 and FEF 25-75% and resting dysfunction noted due to decrease in total lung capacity.  No bronchodilator challenge was done.  The patient had normal diffusion capacity corrected for alveolar volume.    Apparently had a pulmonary function test done around time ago which is not available for comparison.  Clinical correlation was indicated.    Shanice Hebert MD  Pulmonologist/Intensivist  2024 14:39 CDT

## 2024-04-15 ENCOUNTER — LAB (OUTPATIENT)
Dept: LAB | Facility: HOSPITAL | Age: 59
End: 2024-04-15
Payer: MEDICARE

## 2024-04-15 ENCOUNTER — CLINICAL SUPPORT (OUTPATIENT)
Dept: ONCOLOGY | Facility: CLINIC | Age: 59
End: 2024-04-15
Payer: MEDICARE

## 2024-04-15 VITALS
SYSTOLIC BLOOD PRESSURE: 118 MMHG | RESPIRATION RATE: 14 BRPM | DIASTOLIC BLOOD PRESSURE: 76 MMHG | HEART RATE: 86 BPM | OXYGEN SATURATION: 99 %

## 2024-04-15 DIAGNOSIS — E83.110 HEMOCHROMATOSIS ASSOCIATED WITH COMPOUND HETEROZYGOUS MUTATION IN HFE GENE: ICD-10-CM

## 2024-04-15 DIAGNOSIS — E83.118 OTHER HEMOCHROMATOSIS: Primary | ICD-10-CM

## 2024-04-15 LAB
BASOPHILS # BLD AUTO: 0.08 10*3/MM3 (ref 0–0.2)
BASOPHILS NFR BLD AUTO: 1.1 % (ref 0–1.5)
DEPRECATED RDW RBC AUTO: 40.9 FL (ref 37–54)
EOSINOPHIL # BLD AUTO: 0.37 10*3/MM3 (ref 0–0.4)
EOSINOPHIL NFR BLD AUTO: 5.3 % (ref 0.3–6.2)
ERYTHROCYTE [DISTWIDTH] IN BLOOD BY AUTOMATED COUNT: 12.3 % (ref 12.3–15.4)
FERRITIN SERPL-MCNC: 57.61 NG/ML (ref 30–400)
HCT VFR BLD AUTO: 46.4 % (ref 37.5–51)
HGB BLD-MCNC: 15.6 G/DL (ref 13–17.7)
IMM GRANULOCYTES # BLD AUTO: 0.04 10*3/MM3 (ref 0–0.05)
IMM GRANULOCYTES NFR BLD AUTO: 0.6 % (ref 0–0.5)
LYMPHOCYTES # BLD AUTO: 1.43 10*3/MM3 (ref 0.7–3.1)
LYMPHOCYTES NFR BLD AUTO: 20.5 % (ref 19.6–45.3)
MCH RBC QN AUTO: 30.7 PG (ref 26.6–33)
MCHC RBC AUTO-ENTMCNC: 33.6 G/DL (ref 31.5–35.7)
MCV RBC AUTO: 91.3 FL (ref 79–97)
MONOCYTES # BLD AUTO: 0.86 10*3/MM3 (ref 0.1–0.9)
MONOCYTES NFR BLD AUTO: 12.3 % (ref 5–12)
NEUTROPHILS NFR BLD AUTO: 4.21 10*3/MM3 (ref 1.7–7)
NEUTROPHILS NFR BLD AUTO: 60.2 % (ref 42.7–76)
NRBC BLD AUTO-RTO: 0 /100 WBC (ref 0–0.2)
PLATELET # BLD AUTO: 214 10*3/MM3 (ref 140–450)
PMV BLD AUTO: 10 FL (ref 6–12)
RBC # BLD AUTO: 5.08 10*6/MM3 (ref 4.14–5.8)
WBC NRBC COR # BLD AUTO: 6.99 10*3/MM3 (ref 3.4–10.8)

## 2024-04-15 PROCEDURE — 36415 COLL VENOUS BLD VENIPUNCTURE: CPT

## 2024-04-15 PROCEDURE — 82728 ASSAY OF FERRITIN: CPT

## 2024-04-15 PROCEDURE — 99195 PHLEBOTOMY: CPT | Performed by: NURSE PRACTITIONER

## 2024-04-15 PROCEDURE — 85025 COMPLETE CBC W/AUTO DIFF WBC: CPT

## 2024-04-15 NOTE — PROGRESS NOTES
Patient here for lab evaluation for possible 500 ml phlebotomy for hemachromatosis for goal ferritin less than 50.  Patient denies any problems today.  Skin warm, dry, and color within normal limits.  Labs reviewed hemoglobin 15.6 and ferritin 57.61.  500 ml phlebotomy performed per left antecubital.  Patient tolerated without any difficulty.  Denies being lightheaded or dizzy upon standing.  Will return in 3 months for follow up  with Nat LIPSCOMB.

## 2024-04-21 NOTE — TELEPHONE ENCOUNTER
[place] Sleepiness Scale    Situation Chance of Dozing or Sleeping   • Sitting and reading 3 - high chance of dosing or sleeping   • Watching TV 2 - moderate chance of dosing or sleeping   • Sitting inactive in a public place 2 - moderate chance of dosing or sleeping   • Being a passenger in a motor vehicle for an hour or more 3 - high chance of dosing or sleeping   • Lying down in the afternoon 3 - high chance of dosing or sleeping   • Sitting and talking to someone 0 - would never dose or sleep   • Sitting quietly after lunch (no alcohol) 2 - moderate chance of dosing or sleeping   • Stopped for a few minutes in traffic while driving 0 - would never dose or sleep   Total score (add the scores up) 15       MEHRAN--PATIENT REQUESTS A HOME SLEEP STUDY.  HE DOES NOT WEAR O2.    
Dr Hebert, patient is requesting a home sleep study.   
Yes

## 2024-04-23 RX ORDER — TADALAFIL 20 MG/1
20 TABLET ORAL DAILY PRN
Qty: 30 TABLET | Refills: 1 | Status: SHIPPED | OUTPATIENT
Start: 2024-04-23

## 2024-05-24 ENCOUNTER — PATIENT MESSAGE (OUTPATIENT)
Dept: FAMILY MEDICINE CLINIC | Facility: CLINIC | Age: 59
End: 2024-05-24
Payer: MEDICARE

## 2024-05-24 RX ORDER — AMOXICILLIN AND CLAVULANATE POTASSIUM 875; 125 MG/1; MG/1
1 TABLET, FILM COATED ORAL 2 TIMES DAILY
Qty: 20 TABLET | Refills: 0 | Status: SHIPPED | OUTPATIENT
Start: 2024-05-24 | End: 2024-06-03

## 2024-05-24 RX ORDER — PREDNISONE 20 MG/1
20 TABLET ORAL DAILY
Qty: 5 TABLET | Refills: 0 | Status: SHIPPED | OUTPATIENT
Start: 2024-05-24 | End: 2024-05-29

## 2024-05-24 NOTE — TELEPHONE ENCOUNTER
From: Iam Gibbs  To: Nick Jackson  Sent: 5/24/2024 9:37 AM CDT  Subject: Not feeling well     I have an irritating cough that is a little productive and head congestion that feels like it’s going into sinusitis/bronchitis. 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 The quickest I could be at the office is 8 Monday morning… I really don’t want to go that long without something. I appreciate whatever you do. Thanks!

## 2024-05-30 ENCOUNTER — APPOINTMENT (OUTPATIENT)
Dept: GENERAL RADIOLOGY | Facility: HOSPITAL | Age: 59
End: 2024-05-30
Payer: MEDICARE

## 2024-05-30 ENCOUNTER — HOSPITAL ENCOUNTER (EMERGENCY)
Facility: HOSPITAL | Age: 59
Discharge: HOME | End: 2024-05-30
Attending: STUDENT IN AN ORGANIZED HEALTH CARE EDUCATION/TRAINING PROGRAM
Payer: MEDICARE

## 2024-05-30 VITALS
BODY MASS INDEX: 29.73 KG/M2 | HEART RATE: 93 BPM | WEIGHT: 185 LBS | HEIGHT: 66 IN | OXYGEN SATURATION: 96 % | SYSTOLIC BLOOD PRESSURE: 118 MMHG | RESPIRATION RATE: 20 BRPM | DIASTOLIC BLOOD PRESSURE: 97 MMHG | TEMPERATURE: 98.5 F

## 2024-05-30 DIAGNOSIS — B34.8 INFECTION DUE TO HUMAN METAPNEUMOVIRUS (HMPV): ICD-10-CM

## 2024-05-30 DIAGNOSIS — R05.2 SUBACUTE COUGH: ICD-10-CM

## 2024-05-30 DIAGNOSIS — Z87.09 HISTORY OF TRACHEAL STENOSIS: ICD-10-CM

## 2024-05-30 DIAGNOSIS — J44.1 COPD WITH ACUTE EXACERBATION: Primary | ICD-10-CM

## 2024-05-30 DIAGNOSIS — Z87.09 HISTORY OF COPD: ICD-10-CM

## 2024-05-30 DIAGNOSIS — R06.02 SHORTNESS OF BREATH: ICD-10-CM

## 2024-05-30 DIAGNOSIS — Z99.81 ON HOME OXYGEN THERAPY: ICD-10-CM

## 2024-05-30 LAB
ALBUMIN SERPL-MCNC: 4.6 G/DL (ref 3.5–5.2)
ALBUMIN/GLOB SERPL: 1.4 G/DL
ALP SERPL-CCNC: 69 U/L (ref 39–117)
ALT SERPL W P-5'-P-CCNC: 31 U/L (ref 1–41)
ANION GAP SERPL CALCULATED.3IONS-SCNC: 11 MMOL/L (ref 5–15)
AST SERPL-CCNC: 28 U/L (ref 1–40)
B PARAPERT DNA SPEC QL NAA+PROBE: NOT DETECTED
B PERT DNA SPEC QL NAA+PROBE: NOT DETECTED
BASOPHILS # BLD AUTO: 0.04 10*3/MM3 (ref 0–0.2)
BASOPHILS NFR BLD AUTO: 0.6 % (ref 0–1.5)
BILIRUB SERPL-MCNC: 0.3 MG/DL (ref 0–1.2)
BUN SERPL-MCNC: 19 MG/DL (ref 6–20)
BUN/CREAT SERPL: 15.3 (ref 7–25)
C PNEUM DNA NPH QL NAA+NON-PROBE: NOT DETECTED
CALCIUM SPEC-SCNC: 9.1 MG/DL (ref 8.6–10.5)
CHLORIDE SERPL-SCNC: 95 MMOL/L (ref 98–107)
CO2 SERPL-SCNC: 30 MMOL/L (ref 22–29)
CREAT SERPL-MCNC: 1.24 MG/DL (ref 0.76–1.27)
D DIMER PPP FEU-MCNC: 0.35 MCGFEU/ML (ref 0–0.57)
DEPRECATED RDW RBC AUTO: 42 FL (ref 37–54)
EGFRCR SERPLBLD CKD-EPI 2021: 67.8 ML/MIN/1.73
EOSINOPHIL # BLD AUTO: 0.28 10*3/MM3 (ref 0–0.4)
EOSINOPHIL NFR BLD AUTO: 4.4 % (ref 0.3–6.2)
ERYTHROCYTE [DISTWIDTH] IN BLOOD BY AUTOMATED COUNT: 12.7 % (ref 12.3–15.4)
FLUAV SUBTYP SPEC NAA+PROBE: NOT DETECTED
FLUBV RNA ISLT QL NAA+PROBE: NOT DETECTED
GLOBULIN UR ELPH-MCNC: 3.3 GM/DL
GLUCOSE SERPL-MCNC: 112 MG/DL (ref 65–99)
HADV DNA SPEC NAA+PROBE: NOT DETECTED
HCOV 229E RNA SPEC QL NAA+PROBE: NOT DETECTED
HCOV HKU1 RNA SPEC QL NAA+PROBE: NOT DETECTED
HCOV NL63 RNA SPEC QL NAA+PROBE: NOT DETECTED
HCOV OC43 RNA SPEC QL NAA+PROBE: NOT DETECTED
HCT VFR BLD AUTO: 52.6 % (ref 37.5–51)
HGB BLD-MCNC: 17.4 G/DL (ref 13–17.7)
HMPV RNA NPH QL NAA+NON-PROBE: DETECTED
HPIV1 RNA ISLT QL NAA+PROBE: NOT DETECTED
HPIV2 RNA SPEC QL NAA+PROBE: NOT DETECTED
HPIV3 RNA NPH QL NAA+PROBE: NOT DETECTED
HPIV4 P GENE NPH QL NAA+PROBE: NOT DETECTED
IMM GRANULOCYTES # BLD AUTO: 0.02 10*3/MM3 (ref 0–0.05)
IMM GRANULOCYTES NFR BLD AUTO: 0.3 % (ref 0–0.5)
LYMPHOCYTES # BLD AUTO: 1.23 10*3/MM3 (ref 0.7–3.1)
LYMPHOCYTES NFR BLD AUTO: 19.2 % (ref 19.6–45.3)
M PNEUMO IGG SER IA-ACNC: NOT DETECTED
MAGNESIUM SERPL-MCNC: 2.2 MG/DL (ref 1.6–2.6)
MCH RBC QN AUTO: 29.9 PG (ref 26.6–33)
MCHC RBC AUTO-ENTMCNC: 33.1 G/DL (ref 31.5–35.7)
MCV RBC AUTO: 90.4 FL (ref 79–97)
MONOCYTES # BLD AUTO: 0.78 10*3/MM3 (ref 0.1–0.9)
MONOCYTES NFR BLD AUTO: 12.2 % (ref 5–12)
NEUTROPHILS NFR BLD AUTO: 4.04 10*3/MM3 (ref 1.7–7)
NEUTROPHILS NFR BLD AUTO: 63.3 % (ref 42.7–76)
NRBC BLD AUTO-RTO: 0 /100 WBC (ref 0–0.2)
NT-PROBNP SERPL-MCNC: <36 PG/ML (ref 0–900)
PLATELET # BLD AUTO: 218 10*3/MM3 (ref 140–450)
PMV BLD AUTO: 10.5 FL (ref 6–12)
POTASSIUM SERPL-SCNC: 4.1 MMOL/L (ref 3.5–5.2)
PROT SERPL-MCNC: 7.9 G/DL (ref 6–8.5)
QT INTERVAL: 322 MS
QTC INTERVAL: 423 MS
RBC # BLD AUTO: 5.82 10*6/MM3 (ref 4.14–5.8)
RHINOVIRUS RNA SPEC NAA+PROBE: NOT DETECTED
RSV RNA NPH QL NAA+NON-PROBE: NOT DETECTED
SARS-COV-2 RNA NPH QL NAA+NON-PROBE: NOT DETECTED
SODIUM SERPL-SCNC: 136 MMOL/L (ref 136–145)
WBC NRBC COR # BLD AUTO: 6.39 10*3/MM3 (ref 3.4–10.8)

## 2024-05-30 PROCEDURE — 93010 ELECTROCARDIOGRAM REPORT: CPT | Performed by: INTERNAL MEDICINE

## 2024-05-30 PROCEDURE — 94664 DEMO&/EVAL PT USE INHALER: CPT

## 2024-05-30 PROCEDURE — 85025 COMPLETE CBC W/AUTO DIFF WBC: CPT | Performed by: STUDENT IN AN ORGANIZED HEALTH CARE EDUCATION/TRAINING PROGRAM

## 2024-05-30 PROCEDURE — 93005 ELECTROCARDIOGRAM TRACING: CPT | Performed by: STUDENT IN AN ORGANIZED HEALTH CARE EDUCATION/TRAINING PROGRAM

## 2024-05-30 PROCEDURE — 25010000002 METHYLPREDNISOLONE PER 125 MG: Performed by: STUDENT IN AN ORGANIZED HEALTH CARE EDUCATION/TRAINING PROGRAM

## 2024-05-30 PROCEDURE — 83880 ASSAY OF NATRIURETIC PEPTIDE: CPT | Performed by: STUDENT IN AN ORGANIZED HEALTH CARE EDUCATION/TRAINING PROGRAM

## 2024-05-30 PROCEDURE — 71045 X-RAY EXAM CHEST 1 VIEW: CPT

## 2024-05-30 PROCEDURE — 99284 EMERGENCY DEPT VISIT MOD MDM: CPT

## 2024-05-30 PROCEDURE — 83735 ASSAY OF MAGNESIUM: CPT | Performed by: STUDENT IN AN ORGANIZED HEALTH CARE EDUCATION/TRAINING PROGRAM

## 2024-05-30 PROCEDURE — 85379 FIBRIN DEGRADATION QUANT: CPT | Performed by: STUDENT IN AN ORGANIZED HEALTH CARE EDUCATION/TRAINING PROGRAM

## 2024-05-30 PROCEDURE — 96374 THER/PROPH/DIAG INJ IV PUSH: CPT

## 2024-05-30 PROCEDURE — 94640 AIRWAY INHALATION TREATMENT: CPT

## 2024-05-30 PROCEDURE — 80053 COMPREHEN METABOLIC PANEL: CPT | Performed by: STUDENT IN AN ORGANIZED HEALTH CARE EDUCATION/TRAINING PROGRAM

## 2024-05-30 PROCEDURE — 0202U NFCT DS 22 TRGT SARS-COV-2: CPT | Performed by: STUDENT IN AN ORGANIZED HEALTH CARE EDUCATION/TRAINING PROGRAM

## 2024-05-30 RX ORDER — METHYLPREDNISOLONE SODIUM SUCCINATE 125 MG/2ML
125 INJECTION, POWDER, LYOPHILIZED, FOR SOLUTION INTRAMUSCULAR; INTRAVENOUS ONCE
Status: COMPLETED | OUTPATIENT
Start: 2024-05-30 | End: 2024-05-30

## 2024-05-30 RX ORDER — SODIUM CHLORIDE 0.9 % (FLUSH) 0.9 %
10 SYRINGE (ML) INJECTION AS NEEDED
Status: DISCONTINUED | OUTPATIENT
Start: 2024-05-30 | End: 2024-05-30 | Stop reason: HOSPADM

## 2024-05-30 RX ORDER — DOXYCYCLINE 100 MG/1
100 CAPSULE ORAL 2 TIMES DAILY
Qty: 20 CAPSULE | Refills: 0 | Status: SHIPPED | OUTPATIENT
Start: 2024-05-30 | End: 2024-06-09

## 2024-05-30 RX ORDER — GUAIFENESIN AND DEXTROMETHORPHAN HYDROBROMIDE 600; 30 MG/1; MG/1
2 TABLET, EXTENDED RELEASE ORAL ONCE
Status: DISCONTINUED | OUTPATIENT
Start: 2024-05-30 | End: 2024-05-30 | Stop reason: HOSPADM

## 2024-05-30 RX ORDER — IPRATROPIUM BROMIDE AND ALBUTEROL SULFATE 2.5; .5 MG/3ML; MG/3ML
3 SOLUTION RESPIRATORY (INHALATION) ONCE
Status: COMPLETED | OUTPATIENT
Start: 2024-05-30 | End: 2024-05-30

## 2024-05-30 RX ADMIN — IPRATROPIUM BROMIDE AND ALBUTEROL SULFATE 3 ML: .5; 3 SOLUTION RESPIRATORY (INHALATION) at 02:08

## 2024-05-30 RX ADMIN — METHYLPREDNISOLONE SODIUM SUCCINATE 125 MG: 125 INJECTION, POWDER, FOR SOLUTION INTRAMUSCULAR; INTRAVENOUS at 01:50

## 2024-05-30 NOTE — Clinical Note
[facility] EMERGENCY DEPARTMENT  [address]  [facility] [address]  Phone: [phone]    Iam Gibbs was seen and treated in our emergency department on 5/30/2024.  He may return to work on 06/01/2024.         Thank you for choosing [facility].    Luiz Alvarado MD

## 2024-05-30 NOTE — ED PROVIDER NOTES
"EMERGENCY DEPARTMENT ATTENDING NOTE    Patient Name: Iam Gibbs    Chief Complaint   Patient presents with    Cough    Shortness of Breath       PATIENT PRESENTATION:  Iam Gibbs is a very pleasant 59 y.o. male with a history of COPD as well as a history of Charcot-Claire-Tooth disease present emergency department due to shortness of breath.    Patient states that he is on baseline 2.5 L nasal cannula at all times in the setting of poor lung function.  He has both Charcot-Claire-Tooth syndrome as well as history of COPD.  States he was around some friends at a bar there was a lot of people smoking cessation so he feels like things got worse about a week ago he been coughing productive cough as well has not any fevers or chills.  Has been using his breathing treatments at home he called his primary care provider sent in a prescription for steroids also was prescribed Augmentin he states he was not prescribed doxycycline or any other antibiotics.  He has been taking them with really no improvement which allowed him presents emergency department.  He has known tracheal stenosis he states that near his vocal cords only open about 5 mm has been advised by ENT also an ENT at [facility] was provided multiple options but has been going on some somewhat chronically has never sought any treatment.  States to be intubated without difficulty in the past but always tells anesthesiologist when he had prior procedures like shoulder surgeries.  He does not feel essentially getting worse just not improving which will help him presents the emergency department.      PHYSICAL EXAM:   VS: /97 (BP Location: Right arm, Patient Position: Sitting)   Pulse 93   Temp 98.5 °F (36.9 °C) (Oral)   Resp 20   Ht 167.6 cm (66\")   Wt 83.9 kg (185 lb)   SpO2 96%   BMI 29.86 kg/m²   GENERAL: Well-appearing male gentleman sitting in chair next to stretcher on oxygen in no acute distress; " well-nourished, well-developed, awake, alert, no acute distress, nontoxic appearing, comfortable  EYES: PERRL, sclerae anicteric, extraocular movements grossly intact, symmetric lids  EARS, NOSE, MOUTH, THROAT: atraumatic external nose and ears, moist mucous membranes  NECK: symmetric, trachea midline  RESPIRATORY: Significant transmitted upper airway sounds near the naris; no inspiratory or expiratory wheezing auscultation of the neck; prolonged expiratory phase but no obvious rhonchi with relatively good air movement; frequent cough during exam  CARDIOVASCULAR: no murmurs, peripheral pulses 2+ and equal in all extremities  GI: soft, nontender, nondistended  MUSCULOSKELETAL/EXTREMITIES: Contractures consistent with Charcot Claire tooth syndrome; otherwise extremities without obvious deformity  SKIN: warm and dry with no obvious rashes  NEUROLOGIC: moving all 4 extremities symmetrically, awake and alert  PSYCHIATRIC: alert, pleasant and cooperative. Appropriate mood and affect.      MEDICAL DECISION MAKING:    Iam Gibbs is a 59 y.o. male who presented to the ED with shortness of breath and cough in the setting of history of COPD as well as chronic oxygen use at home in the setting of COPD recently given outpatient antibiotics via phone without evaluation.    Differential Diagnosis Considered: COPD with acute exacerbation, viral upper EXTR tract infection, pneumonia, pulmonary embolism    Labs Ordered:  Labs Reviewed   RESPIRATORY PANEL PCR W/ COVID-19 (SARS-COV-2), NP SWAB IN UTM/VTP, 2 HR TAT - Abnormal; Notable for the following components:       Result Value    Human Metapneumovirus Detected (*)     All other components within normal limits    Narrative:     In the setting of a positive respiratory panel with a viral infection PLUS a negative procalcitonin without other underlying concern for bacterial infection, consider observing off antibiotics or discontinuation of antibiotics and continue  supportive care. If the respiratory panel is positive for atypical bacterial infection (Bordetella pertussis, Chlamydophila pneumoniae, or Mycoplasma pneumoniae), consider antibiotic de-escalation to target atypical bacterial infection.   COMPREHENSIVE METABOLIC PANEL - Abnormal; Notable for the following components:    Glucose 112 (*)     Chloride 95 (*)     CO2 30.0 (*)     All other components within normal limits    Narrative:     GFR Normal >60  Chronic Kidney Disease <60  Kidney Failure <15     CBC WITH AUTO DIFFERENTIAL - Abnormal; Notable for the following components:    RBC 5.82 (*)     Hematocrit 52.6 (*)     Lymphocyte % 19.2 (*)     Monocyte % 12.2 (*)     All other components within normal limits   MAGNESIUM - Normal   BNP (IN-HOUSE) - Normal    Narrative:     This assay is used as an aid in the diagnosis of individuals suspected of having heart failure. It can be used as an aid in the diagnosis of acute decompensated heart failure (ADHF) in patients presenting with signs and symptoms of ADHF to the emergency department (ED). In addition, NT-proBNP of <300 pg/mL indicates ADHF is not likely.    Age Range Result Interpretation  NT-proBNP Concentration (pg/mL:      <50             Positive            >450                   Gray                 300-450                    Negative             <300    50-75           Positive            >900                  Gray                300-900                  Negative            <300      >75             Positive            >1800                  Gray                300-1800                  Negative            <300   D-DIMER, QUANTITATIVE - Normal    Narrative:     According to the assay 's published package insert, a normal (<0.50 MCGFEU/mL) D-dimer result in conjunction with a non-high clinical probability assessment, excludes deep vein thrombosis (DVT) and pulmonary embolism (PE) with high sensitivity.    D-dimer values increase with age and this  "can make VTE exclusion of an older population difficult. To address this, the [employer], based on best available evidence and recent guidelines, recommends that clinicians use age-adjusted D-dimer thresholds in patients greater than 50 years of age with: a) a low probability of PE who do not meet all Pulmonary Embolism Rule Out Criteria, or b) in those with intermediate probability of PE.   The formula for an age-adjusted D-dimer cut-off is \"age/100\".  For example, a 60 year old patient would have an age-adjusted cut-off of 0.60 MCGFEU/mL and an 80 year old 0.80 MCGFEU/mL.   CBC AND DIFFERENTIAL    Narrative:     The following orders were created for panel order CBC & Differential.  Procedure                               Abnormality         Status                     ---------                               -----------         ------                     CBC Auto Differential[[id number]]        Abnormal            Final result                 Please view results for these tests on the individual orders.        Imaging Ordered:   XR Chest 1 View   ED Interpretation   No focal consolidation or pneumonia.      Final Result   No acute findings.       This report was signed and finalized on 5/30/2024 7:06 AM by Dr. Earl Owens MD.              Internal chart review:   Past Medical History:   Diagnosis Date    Anemia     Arthritis     Back pain     Charcot-Claire-Tooth disease     DX AT 19     Claustrophobia     COPD (chronic obstructive pulmonary disease)     Elevated cholesterol     Gait abnormality     DUE TO CMT     GERD (gastroesophageal reflux disease)     Hyperlipidemia     Hypertension     Irritable bowel     Other diseases of vocal cords     NARROW VOICE BOX     Sleep apnea     DOES NOT USE BIPAP OR CPAP        Past Surgical History:   Procedure Laterality Date    BICEPS TENDONESIS SUBPECTORALIS REPAIR Right 6/28/2019    Procedure: BICEPS TENODESIS - RIGHT;  Surgeon: Yeison Agudelo MD;  " Location: [facility] OR;  Service: Orthopedics    COLONOSCOPY N/A 2/17/2020    Procedure: COLONOSCOPY WITH ANESTHESIA;  Surgeon: Cal Vela DO;  Location: [facility] ENDOSCOPY;  Service: Gastroenterology;  Laterality: N/A;  preop; blood in stool  postop; polyps   PCP anand Jackson     ENDOSCOPY N/A 2/25/2020    Procedure: ESOPHAGOGASTRODUODENOSCOPY WITH ANESTHESIA;  Surgeon: Cal Vela DO;  Location: [facility] ENDOSCOPY;  Service: Gastroenterology;  Laterality: N/A;  preop; blood in stool  postop; normal   PCP Anand Jackson     SHOULDER ARTHROSCOPY W/ ROTATOR CUFF REPAIR Right 6/28/2019    Procedure: RIGHT SHOULDER ARTHROSCOPIC ROTATOR CUFF REPAIR, SUBACROMIAL DECOMPRESSION;  Surgeon: Yeison Agudelo MD;  Location: [facility] OR;  Service: Orthopedics       Allergies   Allergen Reactions    Adhesive Tape Rash     VERY SENSITIVE TO ADHESIVE     Latex Rash and Unknown - High Severity       No current facility-administered medications for this encounter.    Current Outpatient Medications:     acetaminophen (TYLENOL) 500 MG tablet, Take 1 tablet by mouth Every 6 (Six) Hours As Needed for Mild Pain., Disp: , Rfl:     albuterol (PROVENTIL) (2.5 MG/3ML) 0.083% nebulizer solution, Take 2.5 mg by nebulization 3 (Three) Times a Day., Disp: 90 each, Rfl: 1    allopurinol (ZYLOPRIM) 100 MG tablet, TAKE ONE (1) TABLET BY MOUTH DAILY., Disp: 90 tablet, Rfl: 10    amoxicillin-clavulanate (AUGMENTIN) 875-125 MG per tablet, Take 1 tablet by mouth 2 (Two) Times a Day for 10 days., Disp: 20 tablet, Rfl: 0    Azelastine HCl 137 MCG/SPRAY solution, 2 sprays into the nostril(s) as directed by provider 2 (Two) Times a Day., Disp: 60 mL, Rfl: 5    Budeson-Glycopyrrol-Formoterol (Breztri Aerosphere) 160-9-4.8 MCG/ACT aerosol inhaler, Inhale 2 puffs 2 (Two) Times a Day., Disp: 10.7 g, Rfl: 10    buPROPion XL (WELLBUTRIN XL) 150 MG 24 hr tablet, TAKE ONE (1) TABLET BY MOUTH DAILY., Disp: 90 tablet, Rfl: 10    busPIRone (BUSPAR) 15 MG tablet, TAKE  1/2 (ONE-HALF) TABLET BY MOUTH 2 (TWO) TIMES A DAY., Disp: 30 tablet, Rfl: 3    doxycycline (MONODOX) 100 MG capsule, Take 1 capsule by mouth 2 (Two) Times a Day for 10 days., Disp: 20 capsule, Rfl: 0    fluticasone (FLONASE) 50 MCG/ACT nasal spray, 2 sprays into the nostril(s) as directed by provider Daily., Disp: 16 g, Rfl: 5    gemfibrozil (LOPID) 600 MG tablet, TAKE ONE (1) TABLET BY MOUTH DAILY. NEEDS OFFICE APPT., Disp: 90 tablet, Rfl: 4    hydroCHLOROthiazide (HYDRODIURIL) 25 MG tablet, TAKE ONE (1) TABLET BY MOUTH DAILY, Disp: 90 tablet, Rfl: 10    ibuprofen (ADVIL,MOTRIN) 600 MG tablet, Take 1 tablet by mouth Every 6 (Six) Hours As Needed for Mild Pain., Disp: , Rfl:     loratadine (CLARITIN) 10 MG tablet, Take 1 tablet by mouth Daily., Disp: 90 tablet, Rfl: 3    losartan (COZAAR) 25 MG tablet, TAKE ONE (1) TABLET BY MOUTH DAILY., Disp: 90 tablet, Rfl: 4    nystatin (MYCOSTATIN) 100,000 unit/mL suspension, Swish and swallow 5 mL 4 (Four) Times a Day., Disp: 120 mL, Rfl: 0    pantoprazole (PROTONIX) 40 MG EC tablet, TAKE ONE (1) TABLET BY MOUTH DAILY, Disp: 90 tablet, Rfl: 3    tadalafil (Cialis) 20 MG tablet, Take 1 tablet by mouth Daily As Needed for Erectile Dysfunction., Disp: 30 tablet, Rfl: 1    Ventolin  (90 Base) MCG/ACT inhaler, INHALE 2 PUFFS EVERY 4 (FOUR) HOURS AS NEEDED FOR WHEEZING OR SHORTNESS OF AIR., Disp: 18 g, Rfl: 5    My EKG interpretation: Sinus tachycardia the rate of 104 no acute ST elevations ST depressions or T wave inversions.    My lab interpretation: Normal able to count hemoglobin.  Negative D-dimer.  Normal BNP.  Normal mag.  CMP unremarkable.  Rester viral panel positive for human metapneumovirus.    My imaging interpretation: Chest x-ray with no acute findings.    Shared decision making:     ED Course as of 05/30/24 0736   u May 30, 2024   0349 Reevaluated patient at bedside.  He has remained well-appearing throughout his stay in the emergency department.  Counseled  regarding results as well as return precaution emerged part for worsening symptoms which he expressed understanding.  He is already been prescribed 50 mg of prednisone daily for 5 days and Augmentin counseled on adding doxycycline for coverage for acute acquired pneumonia in setting of COPD to be expressed understanding.  Suspect viral syndrome causing his symptoms but antibiotics indicated in the setting of his underlying disease process with COPD exacerbation. [MW]      ED Course User Index  [MW] Luiz Alvarado MD       ED Course and Re-evaluation: 60yo M with history of chronic O2 use in the setting of baseline pulmonary dysfunction with COPD present emergency department due to shortness of breath.  History is quite extensive COPD exacerbation patient actually sitting next to his stretcher sitting up in chair no signs of respiratory distress no retractions.  Lung exam suggest a mild COPD exacerbation.  Started empirically started on steroids and Augmentin in the setting of his symptoms from his primary care that is not seen.  His white blood cell count is normal D-dimer is negative BNP is normal chest x-ray with no pneumonia pneumonia.  Given DuoNeb and methylprednisolone.  Rester viral panel positive for metapneumovirus.  Patient observed with no changes oxygen saturation still satting well and is home oxygen suspect is due to COPD exacerbation.  Counseled patient regarding this broaden coverage to doxycycline for full treatment of commune acquired pneumonia given his COPD and poor lung dysfunction at baseline.  Counseled patient regarding thyroid status as well as importance of labs his primary care provider soon as possible as well as pulmonology as an outpatient with significant return precaution emerged from for worsening symptoms.      ED Diagnosis:  (J44.1) COPD with acute exacerbation    (R06.02) Shortness of breath    (R05.2) Subacute cough    (Z87.09) History of COPD    (Z87.09) History of tracheal  stenosis    (B34.8) Infection due to human metapneumovirus (hMPV)    (Z99.81) On home oxygen therapy     Disposition: to home  Follow up plan: PCP follow up within 2 days, pulmonology within 1 week, return to ED immediately if symptoms worsen        Signed:  Luiz Alvarado MD  Emergency Medicine Physician    Please note that portions of this note were completed with a voice recognition program.      Luiz Alvarado MD  05/30/24 0736

## 2024-05-30 NOTE — Clinical Note
[facility] EMERGENCY DEPARTMENT  [address]  [facility] [address]  Phone: [phone]    Iam Gibbs was seen and treated in our emergency department on 5/30/2024.  He may return to work on 06/01/2024.         Thank you for choosing [facility].    Luiz Alvarado MD

## 2024-05-30 NOTE — DISCHARGE INSTRUCTIONS
You are seen for your symptoms he tested positive for a common cold virus called human metapneumovirus.  Your previous primary care provider prescribed you Augmentin which is 1 antibiotic the need to continue taking it was prescribed you doxycycline to take as well please make sure you take both to completion.  Please continue using her breathing treatments as well as steroids prescribed.  As we discussed if any of her symptoms worsen please may return the emergency department otherwise want to follow-up with primary care provider as well as her pulmonology group as an outpatient soon as possible please call to schedule appointments.

## 2024-06-19 ENCOUNTER — OFFICE VISIT (OUTPATIENT)
Dept: FAMILY MEDICINE CLINIC | Facility: CLINIC | Age: 59
End: 2024-06-19
Payer: MEDICARE

## 2024-06-19 VITALS
BODY MASS INDEX: 31.12 KG/M2 | WEIGHT: 193.6 LBS | DIASTOLIC BLOOD PRESSURE: 90 MMHG | HEIGHT: 66 IN | OXYGEN SATURATION: 94 % | SYSTOLIC BLOOD PRESSURE: 138 MMHG | HEART RATE: 77 BPM

## 2024-06-19 DIAGNOSIS — J44.1 CHRONIC OBSTRUCTIVE PULMONARY DISEASE WITH ACUTE EXACERBATION: ICD-10-CM

## 2024-06-19 DIAGNOSIS — R73.01 ELEVATED FASTING GLUCOSE: ICD-10-CM

## 2024-06-19 DIAGNOSIS — J38.3 VOCAL CORD DISEASE: ICD-10-CM

## 2024-06-19 DIAGNOSIS — J44.9 CHRONIC OBSTRUCTIVE PULMONARY DISEASE, UNSPECIFIED COPD TYPE: ICD-10-CM

## 2024-06-19 DIAGNOSIS — R05.8 RECURRENT COUGH: ICD-10-CM

## 2024-06-19 DIAGNOSIS — R49.0 HOARSENESS: ICD-10-CM

## 2024-06-19 PROCEDURE — 3075F SYST BP GE 130 - 139MM HG: CPT | Performed by: FAMILY MEDICINE

## 2024-06-19 PROCEDURE — 3080F DIAST BP >= 90 MM HG: CPT | Performed by: FAMILY MEDICINE

## 2024-06-19 PROCEDURE — 99214 OFFICE O/P EST MOD 30 MIN: CPT | Performed by: FAMILY MEDICINE

## 2024-06-19 PROCEDURE — 1126F AMNT PAIN NOTED NONE PRSNT: CPT | Performed by: FAMILY MEDICINE

## 2024-06-19 RX ORDER — METHYLPREDNISOLONE 4 MG/1
TABLET ORAL
Qty: 1 EACH | Refills: 0 | Status: SHIPPED | OUTPATIENT
Start: 2024-06-19

## 2024-06-19 RX ORDER — AMOXICILLIN AND CLAVULANATE POTASSIUM 875; 125 MG/1; MG/1
1 TABLET, FILM COATED ORAL 2 TIMES DAILY
Qty: 20 TABLET | Refills: 0 | Status: SHIPPED | OUTPATIENT
Start: 2024-06-19

## 2024-06-19 NOTE — PROGRESS NOTES
BLUE”.   Subjective   Iam Gibbs is a 59 y.o. male presenting with chief complaint of:   Chief Complaint   Patient presents with    URI     Pt presents today with congestion, sob, productive cough symptoms began May 20th.  Pt states he was in ER 3 weeks ago.  Pt has taken two rounds of antibiotic and steroid, has helped but not going away.     AWV none  Last Completed Annual Wellness Visit       This patient has no relevant Health Maintenance data.             History of Present Illness :  Alone.  Here for primarily increased cough since May (see above).   He has COPD and with some changes on CT of his chest that even emphysema.  He seen [facility]Dr Lundberg and he did not like the opinion from him that there was a lot of sinus drainage involved.  He tells a story of going to [place] for second opinion years ago for vocal cord problem; [facility] told him he needed a tracheostomy.  His hoarseness is always worse when he is coughing more.  There is no hemoptysis.  Has been no recent fever.  He stays active mowing and coughing despite this    Has multiple chronic problems to consider that might have a bearing on today's issues; not an interval appointment.       Chronic/acute problems reviewed today:   1. Chronic obstructive pulmonary disease, unspecified COPD type he is aware that he has emphysema COPD    2. Hoarseness worse than usual today.  Denies choking   3. Recurrent cough see others   4. Chronic obstructive pulmonary disease with acute exacerbation every time he stops his antibiotic and steroids he gets worse   5. Vocal cord disease see above; I could not find in epic what was wrong with his vocal cords   6. Elevated fasting glucose Chronic/stable past.  No problem/pattern hypoglycemia/hyperglycemia manifest by poly- dypsia, phagia, uria, or sweats, diaphoretic episodes, syncope/near.  Should be able to tolerate steroids       Has an/another acute issue today: none.    The following  portions of the patient's history were reviewed and updated as appropriate: allergies, current medications, past family history, past medical history, past social history, past surgical history, and problem list.      Current Outpatient Medications:     acetaminophen (TYLENOL) 500 MG tablet, Take 1 tablet by mouth Every 6 (Six) Hours As Needed for Mild Pain., Disp: , Rfl:     albuterol (PROVENTIL) (2.5 MG/3ML) 0.083% nebulizer solution, Take 2.5 mg by nebulization 3 (Three) Times a Day., Disp: 90 each, Rfl: 1    allopurinol (ZYLOPRIM) 100 MG tablet, TAKE ONE (1) TABLET BY MOUTH DAILY., Disp: 90 tablet, Rfl: 10    Azelastine HCl 137 MCG/SPRAY solution, 2 sprays into the nostril(s) as directed by provider 2 (Two) Times a Day., Disp: 60 mL, Rfl: 5    Budeson-Glycopyrrol-Formoterol (Breztri Aerosphere) 160-9-4.8 MCG/ACT aerosol inhaler, Inhale 2 puffs 2 (Two) Times a Day., Disp: 10.7 g, Rfl: 10    buPROPion XL (WELLBUTRIN XL) 150 MG 24 hr tablet, TAKE ONE (1) TABLET BY MOUTH DAILY., Disp: 90 tablet, Rfl: 10    busPIRone (BUSPAR) 15 MG tablet, TAKE 1/2 (ONE-HALF) TABLET BY MOUTH 2 (TWO) TIMES A DAY., Disp: 30 tablet, Rfl: 3    fluticasone (FLONASE) 50 MCG/ACT nasal spray, 2 sprays into the nostril(s) as directed by provider Daily., Disp: 16 g, Rfl: 5    gemfibrozil (LOPID) 600 MG tablet, TAKE ONE (1) TABLET BY MOUTH DAILY. NEEDS OFFICE APPT., Disp: 90 tablet, Rfl: 4    hydroCHLOROthiazide (HYDRODIURIL) 25 MG tablet, TAKE ONE (1) TABLET BY MOUTH DAILY, Disp: 90 tablet, Rfl: 10    ibuprofen (ADVIL,MOTRIN) 600 MG tablet, Take 1 tablet by mouth Every 6 (Six) Hours As Needed for Mild Pain., Disp: , Rfl:     loratadine (CLARITIN) 10 MG tablet, Take 1 tablet by mouth Daily., Disp: 90 tablet, Rfl: 3    losartan (COZAAR) 25 MG tablet, TAKE ONE (1) TABLET BY MOUTH DAILY., Disp: 90 tablet, Rfl: 4    pantoprazole (PROTONIX) 40 MG EC tablet, TAKE ONE (1) TABLET BY MOUTH DAILY, Disp: 90 tablet, Rfl: 3    tadalafil (Cialis) 20 MG  tablet, Take 1 tablet by mouth Daily As Needed for Erectile Dysfunction., Disp: 30 tablet, Rfl: 1    Ventolin  (90 Base) MCG/ACT inhaler, INHALE 2 PUFFS EVERY 4 (FOUR) HOURS AS NEEDED FOR WHEEZING OR SHORTNESS OF AIR., Disp: 18 g, Rfl: 5    Current Outpatient Medications direct pulmonary    albuterol (PROVENTIL) (2.5 MG/3ML) 0.083% nebulizer solution, Take 2.5 mg by nebulization 3 (Three) Times a Day., Disp: 90 each, Rfl: 1    Budeson-Glycopyrrol-Formoterol (Breztri Aerosphere) 160-9-4.8 MCG/ACT aerosol inhaler, Inhale 2 puffs 2 (Two) Times a Day., Disp: 10.7 g, Rfl: 10    Ventolin  (90 Base) MCG/ACT inhaler, INHALE 2 PUFFS EVERY 4 (FOUR) HOURS AS NEEDED FOR WHEEZING OR SHORTNESS OF AIR., Disp: 18 g, Rfl: 5    No problems with medications.    Allergies   Allergen Reactions    Adhesive Tape Rash     VERY SENSITIVE TO ADHESIVE     Latex Rash and Unknown - High Severity       Review of Systems   Constitutional:  Negative for activity change, appetite change, chills and fever.   HENT:  Positive for congestion, postnasal drip, rhinorrhea and voice change. Negative for mouth sores, nosebleeds and trouble swallowing.    Respiratory:  Positive for cough and wheezing. Negative for choking and shortness of breath.    Cardiovascular:  Negative for chest pain, palpitations and leg swelling.   Gastrointestinal: Negative.      Screening:  Mammogram: NA  Bone density: NA  Low dose CT chest: Tobacco-smoker/age 17/1ppd/dc 2015 (35):   CT chest low dose//3.29.24  IMPRESSION:  1.  No suspicious pulmonary nodule.   2.  Hepatic steatosis.  Lung-RADS 1: Negative   No nodules and definitely benign nodules.  Continue annual screening with low-dose CT in 12 months.  GI:   Colon-p,hem/Dane/JAMES/2.17.20/5y  EGD-nl/JAMES/Dane/2.25.20  Prostate: offered 1.3.22/declined  Usual lab order  12m CBC, CMP, LIPID, TSH, fT4, Vit D, B12, folate, iron, PSAs, sed rate, CR-protein    Copy/paste function used for ROS/exam AND each area of  these were reviewed, updated, confirmed and supplemented as needed.  Data reviewed:   Recent admit/ER/MD visits: 1.2.24    1. Chronic obstructive pulmonary disease, unspecified COPD type    2. Acute cough    3. COPD exacerbation          Data review above:   Discussions/medical decisions/reviews:  BP ok  Other vitals ok  Recent Vitals           9/1/2023 9/14/2023 1/2/2024          BP: 124/78 130/84 140/84       Pulse: 90 90 86       Temp: -- 97.1 °F (36.2 °C) 97.3 °F (36.3 °C)       Weight: 85.4 kg (188 lb 3.2 oz) 85.3 kg (188 lb) 85.7 kg (189 lb)       BMI (Calculated): 30.4 30.4 30.5               Steroid and   Neb altuberol    Last cardiac testing:   Echo:   Results for orders placed during the hospital encounter of 01/18/23    Adult Transthoracic Echo Complete w/ Color, Spectral and Contrast if necessary per protocol    Interpretation Summary    Left ventricular systolic function is normal. Left ventricular ejection fraction appears to be 56 - 60%.    Left ventricular diastolic function was normal.    Abnormal global longitudinal LV strain (GLS) = -9.5%.    Estimated right ventricular systolic pressure from tricuspid regurgitation is normal (<35 mmHg).    There is a trivial pericardial effusion. The effusion is fluid filled. There is no evidence of cardiac tamponade.    Spirometry with diffusion/BH/4.1.24  nterpretation   Overall comments:   The above test results are acceptable and repeatable by ATS criteria  From analysis of the above test results the patient showed evidence of moderate obstructive and restrictive dysfunction but obstructive dysfunction noted due to decrease in FEV1 and FEF 25-75% and resting dysfunction noted due to decrease in total lung capacity.  No bronchodilator challenge was done.  The patient had normal diffusion capacity corrected for alveolar volume.     Apparently had a pulmonary function test done around time ago which is not available for comparison.  Clinical correlation was  indicated.     Shanice Hebert MD  Pulmonologist/Intensivist  4/1/2024 14:39 CDT      Radiology considered:   XR Chest 1 View    Result Date: 5/30/2024  No acute findings.  This report was signed and finalized on 5/30/2024 7:06 AM by Dr. Earl Owens MD.      CT Chest Low Dose Cancer Screening WO    Result Date: 3/29/2024  1.  No suspicious pulmonary nodule. 2.  Hepatic steatosis.  Lung-RADS 1: Negative No nodules and definitely benign nodules. Continue annual screening with low-dose CT in 12 months.   This report was signed and finalized on 3/29/2024 2:49 PM by Dr. Earl Owens MD.       9.21.22 CT sinus without BH  IMPRESSION:  1. Maxillary sinusitis. Patent ostiomeatal complex.  2. Normal frontal, sphenoid and ethmoid sinuses. The patent frontonasal  and sphenoethmoid recesses.  3. A mild leftward septal deviation.  4. Uncomplicated lamellar briana bolus of the right middle turbinate.       Lab Results:  Results for orders placed or performed during the hospital encounter of 05/30/24   Respiratory Panel PCR w/COVID-19(SARS-CoV-2) ONELIA/OK/ROSINA/PAD/COR/TIFFANY In-House, NP Swab in UTM/VTM, 2 HR TAT - Swab, Nasopharynx    Specimen: Nasopharynx; Swab   Result Value Ref Range    ADENOVIRUS, PCR Not Detected Not Detected    Coronavirus 229E Not Detected Not Detected    Coronavirus HKU1 Not Detected Not Detected    Coronavirus NL63 Not Detected Not Detected    Coronavirus OC43 Not Detected Not Detected    COVID19 Not Detected Not Detected - Ref. Range    Human Metapneumovirus Detected (A) Not Detected    Human Rhinovirus/Enterovirus Not Detected Not Detected    Influenza A PCR Not Detected Not Detected    Influenza B PCR Not Detected Not Detected    Parainfluenza Virus 1 Not Detected Not Detected    Parainfluenza Virus 2 Not Detected Not Detected    Parainfluenza Virus 3 Not Detected Not Detected    Parainfluenza Virus 4 Not Detected Not Detected    RSV, PCR Not Detected Not Detected    Bordetella pertussis pcr Not  Detected Not Detected    Bordetella parapertussis PCR Not Detected Not Detected    Chlamydophila pneumoniae PCR Not Detected Not Detected    Mycoplasma pneumo by PCR Not Detected Not Detected   Comprehensive Metabolic Panel    Specimen: Blood   Result Value Ref Range    Glucose 112 (H) 65 - 99 mg/dL    BUN 19 6 - 20 mg/dL    Creatinine 1.24 0.76 - 1.27 mg/dL    Sodium 136 136 - 145 mmol/L    Potassium 4.1 3.5 - 5.2 mmol/L    Chloride 95 (L) 98 - 107 mmol/L    CO2 30.0 (H) 22.0 - 29.0 mmol/L    Calcium 9.1 8.6 - 10.5 mg/dL    Total Protein 7.9 6.0 - 8.5 g/dL    Albumin 4.6 3.5 - 5.2 g/dL    ALT (SGPT) 31 1 - 41 U/L    AST (SGOT) 28 1 - 40 U/L    Alkaline Phosphatase 69 39 - 117 U/L    Total Bilirubin 0.3 0.0 - 1.2 mg/dL    Globulin 3.3 gm/dL    A/G Ratio 1.4 g/dL    BUN/Creatinine Ratio 15.3 7.0 - 25.0    Anion Gap 11.0 5.0 - 15.0 mmol/L    eGFR 67.8 >60.0 mL/min/1.73   Magnesium    Specimen: Blood   Result Value Ref Range    Magnesium 2.2 1.6 - 2.6 mg/dL   BNP    Specimen: Blood   Result Value Ref Range    proBNP <36.0 0.0 - 900.0 pg/mL   CBC Auto Differential    Specimen: Blood   Result Value Ref Range    WBC 6.39 3.40 - 10.80 10*3/mm3    RBC 5.82 (H) 4.14 - 5.80 10*6/mm3    Hemoglobin 17.4 13.0 - 17.7 g/dL    Hematocrit 52.6 (H) 37.5 - 51.0 %    MCV 90.4 79.0 - 97.0 fL    MCH 29.9 26.6 - 33.0 pg    MCHC 33.1 31.5 - 35.7 g/dL    RDW 12.7 12.3 - 15.4 %    RDW-SD 42.0 37.0 - 54.0 fl    MPV 10.5 6.0 - 12.0 fL    Platelets 218 140 - 450 10*3/mm3    Neutrophil % 63.3 42.7 - 76.0 %    Lymphocyte % 19.2 (L) 19.6 - 45.3 %    Monocyte % 12.2 (H) 5.0 - 12.0 %    Eosinophil % 4.4 0.3 - 6.2 %    Basophil % 0.6 0.0 - 1.5 %    Immature Grans % 0.3 0.0 - 0.5 %    Neutrophils, Absolute 4.04 1.70 - 7.00 10*3/mm3    Lymphocytes, Absolute 1.23 0.70 - 3.10 10*3/mm3    Monocytes, Absolute 0.78 0.10 - 0.90 10*3/mm3    Eosinophils, Absolute 0.28 0.00 - 0.40 10*3/mm3    Basophils, Absolute 0.04 0.00 - 0.20 10*3/mm3    Immature Grans,  "Absolute 0.02 0.00 - 0.05 10*3/mm3    nRBC 0.0 0.0 - 0.2 /100 WBC   D-dimer, Quantitative    Specimen: Blood   Result Value Ref Range    D-Dimer, Quantitative 0.35 0.00 - 0.57 MCGFEU/mL   ECG 12 Lead Dyspnea   Result Value Ref Range    QT Interval 322 ms    QTC Interval 423 ms       A1C:No results for input(s): \"HGBA1C\" in the last 72039 hours.  GLUCOSE:  Lab Results - Last 18 Months   Lab Units 05/30/24  0114 01/29/24  1231 06/27/23  1040 03/28/23  0919   GLUCOSE mg/dL 112* 87 119* 99     LIPID:No results for input(s): \"CHLPL\", \"LDL\", \"HDL\", \"TRIG\" in the last 75655 hours.  PSA:  Lab Results   Component Value Date/Time    PSA 0.649 01/03/2022 08:53 AM    PSA 0.657 01/21/2020 07:19 AM     CBC:  Lab Results - Last 18 Months   Lab Units 05/30/24  0114 04/15/24  1010 01/29/24  1231 06/27/23  1040 03/28/23  0919 01/18/23  0944   WBC 10*3/mm3 6.39 6.99 6.32 7.09 7.32 6.06   HEMOGLOBIN g/dL 17.4 15.6 15.0 16.9 16.2 15.8   HEMATOCRIT % 52.6* 46.4 44.7 52.1* 49.6 47.8   PLATELETS 10*3/mm3 218 214 213 234 241 239     BMP/CMP:  Lab Results - Last 18 Months   Lab Units 05/30/24  0114 01/29/24  1231 06/27/23  1040 03/28/23  0919   SODIUM mmol/L 136 142 139 141   POTASSIUM mmol/L 4.1 3.8 4.7 3.8   CHLORIDE mmol/L 95* 101 98 98   CO2 mmol/L 30.0* 32.0* 29.0 32.0*   GLUCOSE mg/dL 112* 87 119* 99   BUN mg/dL 19 23* 23* 20   CREATININE mg/dL 1.24 0.99 1.05 1.01   CALCIUM mg/dL 9.1 8.8 9.5 9.6       HEPATIC:  Lab Results - Last 18 Months   Lab Units 05/30/24  0114 01/29/24  1231 06/27/23  1040 03/28/23  0919   ALT (SGPT) U/L 31 22 31 30   AST (SGOT) U/L 28 18 19 22   ALK PHOS U/L 69 46 55 55     HEPATITIS C ANTIBODY:   Lab Results   Component Value Date/Time    HEPCVIRUSABY <0.1 05/28/2020 07:28 AM     Vit D:No results for input(s): \"LEKF67DI\" in the last 06588 hours.  THYROID:No results for input(s): \"TSH\", \"FREET4\", \"FTI\" in the last 34833 hours.    Invalid input(s): \"FREET3\", \"T3\", \"T4\", \"TEUP\", \"TOTALT4\"    Objective   /90  " " Pulse 77   Ht 167.6 cm (66\")   Wt 87.8 kg (193 lb 9.6 oz)   SpO2 94%   BMI 31.25 kg/m²   Body mass index is 31.25 kg/m².    Recent Vitals         5/30/2024 5/30/2024 6/19/2024       BP: 130/100 118/97 138/90     Pulse: 94 93 77     Temp: -- 98.5 °F (36.9 °C) --     Weight: -- -- 87.8 kg (193 lb 9.6 oz)     BMI (Calculated): -- -- 31.3           Wt Readings from Last 15 Encounters:   06/19/24 1126 87.8 kg (193 lb 9.6 oz)   05/30/24 0027 83.9 kg (185 lb)   04/01/24 1325 87.3 kg (192 lb 6.4 oz)   01/29/24 1303 88.5 kg (195 lb)   01/02/24 1551 85.7 kg (189 lb)   09/14/23 1058 85.3 kg (188 lb)   09/01/23 0917 85.4 kg (188 lb 3.2 oz)   04/26/23 1559 85.7 kg (189 lb)   04/07/23 1014 87.1 kg (192 lb)   03/28/23 1007 87.9 kg (193 lb 12.8 oz)   03/13/23 1440 88 kg (194 lb)   01/18/23 1541 87.1 kg (192 lb)   12/07/22 1518 87.3 kg (192 lb 6.4 oz)   11/29/22 0927 85.8 kg (189 lb 1.6 oz)   11/21/22 0836 83.9 kg (185 lb)       Physical Exam  Vitals and nursing note reviewed.   Constitutional:       General: He is not in acute distress.  HENT:      Right Ear: Tympanic membrane, ear canal and external ear normal.      Left Ear: Tympanic membrane, ear canal and external ear normal.      Nose: Rhinorrhea present. No congestion.      Mouth/Throat:      Mouth: Mucous membranes are moist.      Pharynx: Oropharynx is clear. No oropharyngeal exudate or posterior oropharyngeal erythema.      Comments: hoarse  Eyes:      Extraocular Movements: Extraocular movements intact.      Conjunctiva/sclera: Conjunctivae normal.      Pupils: Pupils are equal, round, and reactive to light.   Cardiovascular:      Rate and Rhythm: Normal rate and regular rhythm.   Pulmonary:      Effort: Pulmonary effort is normal. No respiratory distress.      Breath sounds: No stridor. No wheezing, rhonchi or rales.      Comments: Frequent dry cough  Abdominal:      Palpations: Abdomen is soft.      Tenderness: There is no abdominal tenderness.   Musculoskeletal: "      Cervical back: Neck supple.      Right lower leg: No edema.      Left lower leg: No edema.   Lymphadenopathy:      Cervical: No cervical adenopathy.   Neurological:      Mental Status: He is alert and oriented to person, place, and time. Mental status is at baseline.       Assessment & Plan     1. Chronic obstructive pulmonary disease, unspecified COPD type    2. Hoarseness    3. Recurrent cough    4. Chronic obstructive pulmonary disease with acute exacerbation    5. Vocal cord disease    6. Elevated fasting glucose      Data review above:   Discussions/medical decisions/reviews:  BP ok  Other vitals ok  Recent Vitals         5/30/2024 5/30/2024 6/19/2024       BP: 130/100 118/97 138/90     Pulse: 94 93 77     Temp: -- 98.5 °F (36.9 °C) --     Weight: -- -- 87.8 kg (193 lb 9.6 oz)     BMI (Calculated): -- -- 31.3           Wt Readings from Last 15 Encounters:   06/19/24 1126 87.8 kg (193 lb 9.6 oz)   05/30/24 0027 83.9 kg (185 lb)   04/01/24 1325 87.3 kg (192 lb 6.4 oz)   01/29/24 1303 88.5 kg (195 lb)   01/02/24 1551 85.7 kg (189 lb)   09/14/23 1058 85.3 kg (188 lb)   09/01/23 0917 85.4 kg (188 lb 3.2 oz)   04/26/23 1559 85.7 kg (189 lb)   04/07/23 1014 87.1 kg (192 lb)   03/28/23 1007 87.9 kg (193 lb 12.8 oz)   03/13/23 1440 88 kg (194 lb)   01/18/23 1541 87.1 kg (192 lb)   12/07/22 1518 87.3 kg (192 lb 6.4 oz)   11/29/22 0927 85.8 kg (189 lb 1.6 oz)   11/21/22 0836 83.9 kg (185 lb)       Screening reviewed/updated   Agreed to ask Dr HERNANDEZ if he would move up f/u from October  Referral to ENT; needs vocal cord exam and advice on how much upper airway is contributing to his problems  He has COPD; reminded him of that.  No super controlled manifest by recurrent excerbations  He has vocal cord problem; it is contributing to his cough AND maybe even aspiration  For today; he requested another abx/steroid (advised we cannot continue to repeat these without risks); given today    Discussed upcoming intermediate of me/  "Manuel.  Discussed new physician, Dr Rivas Billy coming as replacement. Decision to: stay at /[facility].    Follow up: Return for lab/Dr Billy 6m .  Future Appointments   Date Time Provider Department Center   7/22/2024 10:45 AM [facility] LAB [facility]   7/22/2024 11:15 AM Nat Paez APRN [facility]   10/8/2024  2:15 PM Shanice Hebert MD [facility] \[facility]\"" PAD       Data review above:   Rx: reviewed and decisions:   Rx new/changes: short term  New Medications Ordered This Visit   Medications    methylPREDNISolone (MEDROL) 4 MG dose pack     Sig: Take as directed on package instructions.     Dispense:  1 each     Refill:  0     Pharmacist-tell patient When using steroids Do not use anti-inflammatories such as Motrin/ibuprofen, Alleve/naprosyn, Mobic and like medications    amoxicillin-clavulanate (AUGMENTIN) 875-125 MG per tablet     Sig: Take 1 tablet by mouth 2 (Two) Times a Day.     Dispense:  20 tablet     Refill:  0       Orders placed:   LAB/Testing/Referrals: reviewed/orders:   Today:   Orders Placed This Encounter   Procedures    Ambulatory Referral to ENT (Otolaryngology)     Chronic/recurrent labs above or change to:   Same     Immunization History   Administered Date(s) Administered    COVID-19 (MODERNA) 1st,2nd,3rd Dose Monovalent 07/06/2021, 08/06/2021    Pneumococcal Conjugate 20-Valent (PCV20) 09/01/2023     We advised/reaffirmed our support/suggestion for staying complete with covid- covid boosters, seasonal flu/yearly and any missing vaccine from list we supplied; when cannot be given here we suggest contact with local health department office or pharmacy to review missing/needed vaccines and then bring nursing documentation for these vaccines to this office or call this information in. Shingles became \"free\" 1.1.23 for medicare insurance.    Health maintenance:   Body mass index is 31.25 kg/m².         Tobacco use reviewed:   Iam Gibbs  reports that he quit smoking about " 8 years ago. His smoking use included cigars and cigarettes. He started smoking about 41 years ago. He has a 32.7 pack-year smoking history. He has been exposed to tobacco smoke. He quit smokeless tobacco use about 24 years ago.     There are no Patient Instructions on file for this visit.

## 2024-07-05 DIAGNOSIS — J38.02 BILATERAL VOCAL CORD PARESIS: ICD-10-CM

## 2024-07-05 DIAGNOSIS — R06.1 STRIDOR: ICD-10-CM

## 2024-07-05 DIAGNOSIS — K21.9 GASTROESOPHAGEAL REFLUX DISEASE: ICD-10-CM

## 2024-07-05 RX ORDER — PANTOPRAZOLE SODIUM 40 MG/1
TABLET, DELAYED RELEASE ORAL
Qty: 90 TABLET | Refills: 3 | Status: SHIPPED | OUTPATIENT
Start: 2024-07-05

## 2024-07-08 DIAGNOSIS — F41.9 ANXIETY: ICD-10-CM

## 2024-07-08 DIAGNOSIS — E78.1 HYPERTRIGLYCERIDEMIA: ICD-10-CM

## 2024-07-08 DIAGNOSIS — F32.9 REACTIVE DEPRESSION: ICD-10-CM

## 2024-07-08 RX ORDER — HYDROCHLOROTHIAZIDE 25 MG/1
TABLET ORAL
Qty: 90 TABLET | Refills: 10 | Status: SHIPPED | OUTPATIENT
Start: 2024-07-08

## 2024-07-08 RX ORDER — ALLOPURINOL 100 MG/1
TABLET ORAL
Qty: 90 TABLET | Refills: 10 | Status: SHIPPED | OUTPATIENT
Start: 2024-07-08

## 2024-07-08 RX ORDER — BUPROPION HYDROCHLORIDE 150 MG/1
TABLET ORAL
Qty: 90 TABLET | Refills: 10 | Status: SHIPPED | OUTPATIENT
Start: 2024-07-08

## 2024-07-17 ENCOUNTER — OFFICE VISIT (OUTPATIENT)
Dept: PULMONOLOGY | Facility: CLINIC | Age: 59
End: 2024-07-17
Payer: MEDICARE

## 2024-07-17 VITALS
BODY MASS INDEX: 30.53 KG/M2 | WEIGHT: 190 LBS | OXYGEN SATURATION: 95 % | SYSTOLIC BLOOD PRESSURE: 130 MMHG | HEART RATE: 94 BPM | HEIGHT: 66 IN | DIASTOLIC BLOOD PRESSURE: 84 MMHG

## 2024-07-17 DIAGNOSIS — E66.9 OBESITY (BMI 30-39.9): ICD-10-CM

## 2024-07-17 DIAGNOSIS — G47.34 NOCTURNAL HYPOXEMIA: ICD-10-CM

## 2024-07-17 DIAGNOSIS — J44.9 CHRONIC OBSTRUCTIVE PULMONARY DISEASE, UNSPECIFIED COPD TYPE: Primary | ICD-10-CM

## 2024-07-17 DIAGNOSIS — J30.89 NON-SEASONAL ALLERGIC RHINITIS, UNSPECIFIED TRIGGER: ICD-10-CM

## 2024-07-17 DIAGNOSIS — Z87.891 FORMER SMOKER: ICD-10-CM

## 2024-07-17 DIAGNOSIS — Z99.81 DEPENDENCE ON NOCTURNAL OXYGEN THERAPY: ICD-10-CM

## 2024-07-17 DIAGNOSIS — Z86.16 HISTORY OF COVID-19: ICD-10-CM

## 2024-07-17 DIAGNOSIS — J98.4 RESTRICTIVE LUNG DISEASE: ICD-10-CM

## 2024-07-17 DIAGNOSIS — G47.33 OSA (OBSTRUCTIVE SLEEP APNEA): ICD-10-CM

## 2024-07-17 DIAGNOSIS — G60.0 CHARCOT-MARIE-TOOTH DISEASE: ICD-10-CM

## 2024-07-17 PROCEDURE — 3079F DIAST BP 80-89 MM HG: CPT | Performed by: INTERNAL MEDICINE

## 2024-07-17 PROCEDURE — 99214 OFFICE O/P EST MOD 30 MIN: CPT | Performed by: INTERNAL MEDICINE

## 2024-07-17 PROCEDURE — 3075F SYST BP GE 130 - 139MM HG: CPT | Performed by: INTERNAL MEDICINE

## 2024-07-17 RX ORDER — AZITHROMYCIN 250 MG/1
TABLET, FILM COATED ORAL
COMMUNITY
Start: 2024-07-09

## 2024-07-17 RX ORDER — DOXYCYCLINE HYCLATE 100 MG/1
CAPSULE ORAL
COMMUNITY
Start: 2024-07-09

## 2024-07-17 RX ORDER — PREDNISONE 10 MG/1
TABLET ORAL
COMMUNITY
Start: 2024-07-09

## 2024-07-17 NOTE — PROGRESS NOTES
[facility] OUTPATIENT PROGRESS NOTE    Patient: Iam Gibbs  : 1965  Age: 59 y.o.  Date of Service: 2024    REASON FOR CLINIC VISIT:  Chief Complaint   Patient presents with    COPD       Subjective:    History of Present Illness:  Iam Gibbs is a 59 y.o. male who presents to the office today to be seen for    Diagnosis Plan   1. Chronic obstructive pulmonary disease, unspecified COPD type        2. Restrictive lung disease        3. Charcot-Claire-Tooth disease        4. DARRIN (obstructive sleep apnea)        5. Nocturnal hypoxemia        6. Former smoker        7. History of COVID-19        8. Dependence on nocturnal oxygen therapy        9. Non-seasonal allergic rhinitis, unspecified trigger        10. Obesity (BMI 30-39.9)        .  Other problems per record.    History:    Patient is a very pleasant middle-aged  gentleman who was seen in the pulmonary clinic for follow-up visit.    Patient had history of tobacco use and is a former smoker now.  He also has obstructive sleep apnea and using oxygen at night but does not use any CPAP.  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  He walks with a cane.  He is currently using Breztri and albuterol rescue inhaler and also uses Astelin nasal spray, fluticasone spray and loratadine for nasal allergy.  He is currently having an acute exacerbation of his COPD and is getting a course of doxycycline and steroid taper.    He had no other acute complaints.  He did not have any hospital admissions and ER visit and urgent care visit any other new complaints and overall feeling better and stable he is at his baseline.  He is up-to-date on his vaccinations and no other prescription refill was needed.  He has his last low-dose CT scan ordered in 6 months time and the last one done in the March of this year.    PFT done today:  Not done today    PFT Values          2024     13:30   Pre Drug PFT Results   FVC 72   FEV1 76   FEF 25-75% 92   FEV1/FVC 83   Other Tests PFT Results   TLC 69   RV 53   DLCO 104   D/VAsb 135     Results for orders placed in visit on 24    Spirometry with Diffusion Capacity & Lung Volumes    Narrative  Spirometry with Diffusion Capacity & Lung Volumes    Performed by: Cathy Borges, RRT  Authorized by: Shanice Hebert MD  Pre Drug % Predicted  FVC: 72%  FEV1: 76%  FEF 25-75%: 92%  FEV1/FVC: 83%  T%  RV: 53%  DLCO: 104%  D/VAsb: 135%    Interpretation  Overall comments:  The above test results are acceptable and repeatable by ATS criteria  From analysis of the above test results the patient showed evidence of moderate obstructive and restrictive dysfunction but obstructive dysfunction noted due to decrease in FEV1 and FEF 25-75% and resting dysfunction noted due to decrease in total lung capacity.  No bronchodilator challenge was done.  The patient had normal diffusion capacity corrected for alveolar volume.    Apparently had a pulmonary function test done around time ago which is not available for comparison.  Clinical correlation was indicated.    Shanice Hebert MD  Pulmonologist/Intensivist  2024 14:39 CDT         Bronchodilator therapy: Breztri and Albuterol rescue inhaler    Smoking Status:   Social History     Tobacco Use   Smoking Status Former    Current packs/day: 0.00    Average packs/day: 1 pack/day for 32.7 years (32.7 ttl pk-yrs)    Types: Cigars, Cigarettes    Start date:     Quit date: 10/2015    Years since quittin.8    Passive exposure: Past   Smokeless Tobacco Former    Quit date:      Pulm Rehab: no  Sleep: yes on O2 @ 2LPM at night.  Support System: lives alone    Code Status:   There are no questions and answers to display.        Review of Systems:  A complete review of systems is performed and all other systems were reviewed and negative as note above in the HPI.  Review of Systems   Constitutional:   Positive for fatigue.   HENT:  Positive for congestion, postnasal drip and sinus pressure.    Eyes: Negative.    Respiratory:  Positive for cough, chest tightness and shortness of breath.    Cardiovascular: Negative.    Gastrointestinal: Negative.    Endocrine: Negative.    Genitourinary: Negative.    Musculoskeletal:  Positive for arthralgias, back pain, gait problem and neck stiffness.   Skin: Negative.    Allergic/Immunologic: Positive for environmental allergies.   Neurological:  Positive for weakness.   Hematological: Negative.    Psychiatric/Behavioral: Negative.         CAT/ACT Score:  Not done today    Medications:  Outpatient Encounter Medications as of 7/17/2024   Medication Sig Dispense Refill    acetaminophen (TYLENOL) 500 MG tablet Take 1 tablet by mouth Every 6 (Six) Hours As Needed for Mild Pain.      albuterol (PROVENTIL) (2.5 MG/3ML) 0.083% nebulizer solution Take 2.5 mg by nebulization 3 (Three) Times a Day. 90 each 1    allopurinol (ZYLOPRIM) 100 MG tablet TAKE ONE (1) TABLET BY MOUTH DAILY. 90 tablet 10    Azelastine HCl 137 MCG/SPRAY solution 2 sprays into the nostril(s) as directed by provider 2 (Two) Times a Day. 60 mL 5    azithromycin (ZITHROMAX) 250 MG tablet       Budeson-Glycopyrrol-Formoterol (Breztri Aerosphere) 160-9-4.8 MCG/ACT aerosol inhaler Inhale 2 puffs 2 (Two) Times a Day. 10.7 g 10    buPROPion XL (WELLBUTRIN XL) 150 MG 24 hr tablet TAKE ONE (1) TABLET BY MOUTH DAILY. 90 tablet 10    busPIRone (BUSPAR) 15 MG tablet TAKE 1/2 (ONE-HALF) TABLET BY MOUTH 2 (TWO) TIMES A DAY. 30 tablet 3    doxycycline (VIBRAMYCIN) 100 MG capsule       fluticasone (FLONASE) 50 MCG/ACT nasal spray 2 sprays into the nostril(s) as directed by provider Daily. 16 g 5    gemfibrozil (LOPID) 600 MG tablet TAKE ONE (1) TABLET BY MOUTH DAILY. NEEDS OFFICE APPT. 90 tablet 4    hydroCHLOROthiazide 25 MG tablet TAKE ONE (1) TABLET BY MOUTH DAILY 90 tablet 10    ibuprofen (ADVIL,MOTRIN) 600 MG tablet Take 1  "tablet by mouth Every 6 (Six) Hours As Needed for Mild Pain.      losartan (COZAAR) 25 MG tablet TAKE ONE (1) TABLET BY MOUTH DAILY. 90 tablet 4    methylPREDNISolone (MEDROL) 4 MG dose pack Take as directed on package instructions. 1 each 0    pantoprazole (PROTONIX) 40 MG EC tablet TAKE ONE (1) TABLET BY MOUTH DAILY 90 tablet 3    predniSONE (DELTASONE) 10 MG tablet       tadalafil (Cialis) 20 MG tablet Take 1 tablet by mouth Daily As Needed for Erectile Dysfunction. 30 tablet 1    Ventolin  (90 Base) MCG/ACT inhaler INHALE 2 PUFFS EVERY 4 (FOUR) HOURS AS NEEDED FOR WHEEZING OR SHORTNESS OF AIR. 18 g 5    amoxicillin-clavulanate (AUGMENTIN) 875-125 MG per tablet Take 1 tablet by mouth 2 (Two) Times a Day. (Patient not taking: Reported on 7/17/2024) 20 tablet 0    loratadine (CLARITIN) 10 MG tablet Take 1 tablet by mouth Daily. (Patient not taking: Reported on 7/17/2024) 90 tablet 3     No facility-administered encounter medications on file as of 7/17/2024.       Allergies:  Allergies   Allergen Reactions    Adhesive Tape Rash     VERY SENSITIVE TO ADHESIVE     Latex Rash and Unknown - High Severity       Immunizations:  Immunization History   Administered Date(s) Administered    COVID-19 (MODERNA) 1st,2nd,3rd Dose Monovalent 07/06/2021, 08/06/2021    Pneumococcal Conjugate 20-Valent (PCV20) 09/01/2023       Objective:    Vitals:  /84 (BP Location: Left arm, Patient Position: Sitting, Cuff Size: Adult)   Pulse 94   Ht 167.6 cm (66\")   Wt 86.2 kg (190 lb)   SpO2 95%   BMI 30.67 kg/m²     Physical Exam:  General: Patient is a 59 y.o.  male. Looks stated age. Appears to be in no acute distress.  Eyes: EOMI. PERRLA. Vision intact. No scleral icterus.  Ear, Nose, Mouth and Throat: Hearing is grossly intact. No Leukoplakia, pharyngitis, stomatitis or thrush. Swollen nasal mucosa with post nasal drop.  Neck: Range of motion of neck normal. No thyromegaly or masses. Mallampati Class " 3  Respiratory: Clear to auscultation bilaterally. No use of accessory muscles. Decreased breath sounds.  Cardiovascular: Normal heart sounds. Regularly regular rhythm without murmur.  Gastrointestinal: Non tender, non distended, soft. Bowel sounds positive in all four quadrants. No organomegaly.  Skin: No obvious rashes, lesions, ulcers or large amount of bruising. No edema.   Neurological: No new motor deficits. Cranial nerves appear intact.  Psychiatric: Patient is alert and oriented to person, place and time.    Chest Imaging:      Study Result    Narrative & Impression   EXAM/TECHNIQUE: XR CHEST 1 VW-     INDICATION: acute cough, shortness of breath; J44.1-Chronic obstructive  pulmonary disease with (acute) exacerbation; R06.02-Shortness of breath;  R05.2-Subacute cough; Z87.09-Personal history of other diseases of the  respiratory system; Z87.09-Personal history of other diseases of the  respiratory system     COMPARISON: None available.     FINDINGS:     Cardiac silhouette is within normal limits. No pleural effusion,  pneumothorax, or focal consolidation. No acute osseous finding.     IMPRESSION:  No acute findings.     This report was signed and finalized on 5/30/2024 7:06 AM by Dr. Earl Owens MD.             Assessment:  1. Chronic obstructive pulmonary disease, unspecified COPD type    2. Restrictive lung disease    3. Charcot-Claire-Tooth disease    4. DARRIN (obstructive sleep apnea)    5. Nocturnal hypoxemia    6. Former smoker    7. History of COVID-19    8. Dependence on nocturnal oxygen therapy    9. Non-seasonal allergic rhinitis, unspecified trigger    10. Obesity (BMI 30-39.9)        Plan/Recommendations:    1.  I reviewed the last PFT and a CT scan of the chest and explained results to the patient.  He has mostly mixed obstructive and restrictive dysfunction which is most likely from his Charcot-Claire-Tooth disease.  The obstructive dysfunction is likely from underlying COPD.  2.  Patient  will continue using the Breztri and albuterol rescue inhaler as before.  He did not have any prescription refills at this time.  3.  He has obstructive sleep and nocturnal hypoxemia using oxygen at night 2 to 3 L but does not use any CPAP.  Continue oxygen as before.  He also continue Astelin nasal spray fluticasone aspirin loratadine for nasal allergy.  4.  He will complete a course of antibiotics and steroid taper as he is completing now.  Continue follow-up with the primary care provider.  He will need to get his up-to-date vaccinations annually.  I advised him to return to pulmonary clinic for follow-up visit in 6 months time after the next low-dose CT scan of the chest or earlier if needed.    Follow-up:  6 Months    Time Spent:  30 minutes    I appreciate the opportunity of participating in this patient's care. I would like to thank the PCP for the referral.  Please feel free to contact me with any other questions.    Shanice Hebert MD   Pulmonologist/Intensivist     Electronically signed by: Shanice Hebert MD, 7/17/2024 16:04 CDT

## 2024-07-31 DIAGNOSIS — J40 BRONCHITIS: ICD-10-CM

## 2024-07-31 RX ORDER — ALBUTEROL SULFATE 90 UG/1
2 AEROSOL, METERED RESPIRATORY (INHALATION) EVERY 4 HOURS PRN
Qty: 18 G | Refills: 5 | Status: SHIPPED | OUTPATIENT
Start: 2024-07-31

## 2024-08-07 ENCOUNTER — TELEPHONE (OUTPATIENT)
Dept: ONCOLOGY | Facility: CLINIC | Age: 59
End: 2024-08-07
Payer: MEDICARE

## 2024-08-07 NOTE — TELEPHONE ENCOUNTER
Caller: Iam Gibbs    Relationship to patient: Self    Best call back number: [phone]    Type of visit: LAB AND FU    If rescheduling, when is the original appointment: 7/22 (CAN)

## 2024-08-13 ENCOUNTER — TELEPHONE (OUTPATIENT)
Dept: OTOLARYNGOLOGY | Facility: CLINIC | Age: 59
End: 2024-08-13

## 2024-08-13 NOTE — TELEPHONE ENCOUNTER
Provider: DR. FINK    Caller: Iam Gibbs    Relationship to Patient: Self     Phone Number: [phone]    Reason for Call: RESCHEDULE APPOINTMENT DUE TO SCHEDULING CONFLICT    When was the patient last seen: NEW PATIENT    Notes: RESCHEDULED FROM 08/13/24 TO 10/08/24.

## 2024-08-20 ENCOUNTER — TELEPHONE (OUTPATIENT)
Dept: FAMILY MEDICINE CLINIC | Facility: CLINIC | Age: 59
End: 2024-08-20
Payer: MEDICARE

## 2024-08-20 ENCOUNTER — OFFICE VISIT (OUTPATIENT)
Dept: ONCOLOGY | Facility: CLINIC | Age: 59
End: 2024-08-20
Payer: MEDICARE

## 2024-08-20 ENCOUNTER — LAB (OUTPATIENT)
Dept: LAB | Facility: HOSPITAL | Age: 59
End: 2024-08-20
Payer: MEDICARE

## 2024-08-20 VITALS
SYSTOLIC BLOOD PRESSURE: 138 MMHG | WEIGHT: 191 LBS | HEART RATE: 89 BPM | DIASTOLIC BLOOD PRESSURE: 64 MMHG | OXYGEN SATURATION: 94 % | HEIGHT: 66 IN | RESPIRATION RATE: 18 BRPM | BODY MASS INDEX: 30.7 KG/M2 | TEMPERATURE: 97.2 F

## 2024-08-20 DIAGNOSIS — G60.0 CHARCOT-MARIE-TOOTH DISEASE: ICD-10-CM

## 2024-08-20 DIAGNOSIS — I10 HYPERTENSION, UNSPECIFIED TYPE: ICD-10-CM

## 2024-08-20 DIAGNOSIS — E83.110 HEMOCHROMATOSIS ASSOCIATED WITH COMPOUND HETEROZYGOUS MUTATION IN HFE GENE: Primary | ICD-10-CM

## 2024-08-20 LAB
ALBUMIN SERPL-MCNC: 4.4 G/DL (ref 3.5–5.2)
ALBUMIN/GLOB SERPL: 1.6 G/DL
ALP SERPL-CCNC: 63 U/L (ref 39–117)
ALT SERPL W P-5'-P-CCNC: 21 U/L (ref 1–41)
ANION GAP SERPL CALCULATED.3IONS-SCNC: 12 MMOL/L (ref 5–15)
AST SERPL-CCNC: 19 U/L (ref 1–40)
BASOPHILS # BLD AUTO: 0.07 10*3/MM3 (ref 0–0.2)
BASOPHILS NFR BLD AUTO: 1.2 % (ref 0–1.5)
BILIRUB SERPL-MCNC: 0.4 MG/DL (ref 0–1.2)
BUN SERPL-MCNC: 24 MG/DL (ref 6–20)
BUN/CREAT SERPL: 31.2 (ref 7–25)
CALCIUM SPEC-SCNC: 9.4 MG/DL (ref 8.6–10.5)
CHLORIDE SERPL-SCNC: 101 MMOL/L (ref 98–107)
CO2 SERPL-SCNC: 28 MMOL/L (ref 22–29)
CREAT SERPL-MCNC: 0.77 MG/DL (ref 0.76–1.27)
DEPRECATED RDW RBC AUTO: 43.6 FL (ref 37–54)
EGFRCR SERPLBLD CKD-EPI 2021: 104.4 ML/MIN/1.73
EOSINOPHIL # BLD AUTO: 0.3 10*3/MM3 (ref 0–0.4)
EOSINOPHIL NFR BLD AUTO: 5.2 % (ref 0.3–6.2)
ERYTHROCYTE [DISTWIDTH] IN BLOOD BY AUTOMATED COUNT: 13.3 % (ref 12.3–15.4)
FERRITIN SERPL-MCNC: 72.29 NG/ML (ref 30–400)
GLOBULIN UR ELPH-MCNC: 2.7 GM/DL
GLUCOSE SERPL-MCNC: 112 MG/DL (ref 65–99)
HCT VFR BLD AUTO: 46.1 % (ref 37.5–51)
HGB BLD-MCNC: 15.6 G/DL (ref 13–17.7)
HOLD SPECIMEN: NORMAL
IMM GRANULOCYTES # BLD AUTO: 0.04 10*3/MM3 (ref 0–0.05)
IMM GRANULOCYTES NFR BLD AUTO: 0.7 % (ref 0–0.5)
IRON 24H UR-MRATE: 83 MCG/DL (ref 59–158)
IRON SATN MFR SERPL: 21 % (ref 20–50)
LYMPHOCYTES # BLD AUTO: 1.44 10*3/MM3 (ref 0.7–3.1)
LYMPHOCYTES NFR BLD AUTO: 24.8 % (ref 19.6–45.3)
MCH RBC QN AUTO: 30.2 PG (ref 26.6–33)
MCHC RBC AUTO-ENTMCNC: 33.8 G/DL (ref 31.5–35.7)
MCV RBC AUTO: 89.3 FL (ref 79–97)
MONOCYTES # BLD AUTO: 0.75 10*3/MM3 (ref 0.1–0.9)
MONOCYTES NFR BLD AUTO: 12.9 % (ref 5–12)
NEUTROPHILS NFR BLD AUTO: 3.21 10*3/MM3 (ref 1.7–7)
NEUTROPHILS NFR BLD AUTO: 55.2 % (ref 42.7–76)
NRBC BLD AUTO-RTO: 0 /100 WBC (ref 0–0.2)
PLATELET # BLD AUTO: 234 10*3/MM3 (ref 140–450)
PMV BLD AUTO: 10.2 FL (ref 6–12)
POTASSIUM SERPL-SCNC: 4.1 MMOL/L (ref 3.5–5.2)
PROT SERPL-MCNC: 7.1 G/DL (ref 6–8.5)
RBC # BLD AUTO: 5.16 10*6/MM3 (ref 4.14–5.8)
SODIUM SERPL-SCNC: 141 MMOL/L (ref 136–145)
TIBC SERPL-MCNC: 392 MCG/DL (ref 298–536)
TRANSFERRIN SERPL-MCNC: 263 MG/DL (ref 200–360)
WBC NRBC COR # BLD AUTO: 5.81 10*3/MM3 (ref 3.4–10.8)

## 2024-08-20 PROCEDURE — 83540 ASSAY OF IRON: CPT

## 2024-08-20 PROCEDURE — 3075F SYST BP GE 130 - 139MM HG: CPT | Performed by: NURSE PRACTITIONER

## 2024-08-20 PROCEDURE — 99214 OFFICE O/P EST MOD 30 MIN: CPT | Performed by: NURSE PRACTITIONER

## 2024-08-20 PROCEDURE — 82728 ASSAY OF FERRITIN: CPT

## 2024-08-20 PROCEDURE — 1126F AMNT PAIN NOTED NONE PRSNT: CPT | Performed by: NURSE PRACTITIONER

## 2024-08-20 PROCEDURE — 36415 COLL VENOUS BLD VENIPUNCTURE: CPT

## 2024-08-20 PROCEDURE — 80053 COMPREHEN METABOLIC PANEL: CPT

## 2024-08-20 PROCEDURE — 99195 PHLEBOTOMY: CPT | Performed by: NURSE PRACTITIONER

## 2024-08-20 PROCEDURE — 85025 COMPLETE CBC W/AUTO DIFF WBC: CPT

## 2024-08-20 PROCEDURE — 84466 ASSAY OF TRANSFERRIN: CPT

## 2024-08-20 PROCEDURE — 3078F DIAST BP <80 MM HG: CPT | Performed by: NURSE PRACTITIONER

## 2024-08-20 NOTE — PROGRESS NOTES
[facility]ATOLOGY & ONCOLOGY  [address]  [facility] [address] [phone]    Patient Name: Iam Gibbs  Encounter Date: 08/20/2024  YOB: 1965  Patient Number: [id number]    Hematology / Oncology Progress Note    HPI / REASON FOR VISIT: Iam Gibbs is a 57 y.o. 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   He is being treated with therapeutic phlebotomy q 2 weeks until his ferritin is <50.  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       His LFT have been normal although he does drink beer regularly.  Low dose CT of chest on 01/03/22 showed no acute processes in the upper abdomen.  May 26, 2022 CT Abdomen showed 1. Fatty infiltration of the liver. 2. Mild splenomegaly.  3. Atheromatous disease of the aortoiliac vessels. Degenerative changes of the visualized spine.  He was referred to Cardiology and saw Dr. Jean on June 9, 2022.  He will have echo and follow-up at that office later this month.    INTERVAL HISTORY  He presents to clinic today for continued follow-up.  His last phlebotomy was April 15, 2024 for ferritin 57.   He has tolerated procedures well without any signs and symptoms of hypovolemia or  Hypotension.    He complains of Shortness of breath today. He uses Breztri daily and PRN    He had labs drawn today and results were reviewed with him in office.       LABS    Lab Results - Last 18 Months   Lab Units 08/20/24  0921 05/30/24  0114 04/15/24  1010 01/29/24  1231 06/27/23  1040 03/28/23  0919   HEMOGLOBIN g/dL 15.6 17.4 15.6 15.0 16.9 16.2   HEMATOCRIT % 46.1 52.6* 46.4 44.7 52.1* 49.6   MCV fL 89.3 90.4 91.3 91.0 92.5 90.8   WBC 10*3/mm3 5.81 6.39 6.99 6.32 7.09 7.32   RDW % 13.3 12.7 12.3 12.7 12.6  "13.0   MPV fL 10.2 10.5 10.0 10.0 10.4 10.3   PLATELETS 10*3/mm3 234 218 214 213 234 241   IMM GRAN % % 0.7* 0.3 0.6* 0.5 0.6* 0.8*   NEUTROS ABS 10*3/mm3 3.21 4.04 4.21 3.67 4.35 4.33   LYMPHS ABS 10*3/mm3 1.44 1.23 1.43 1.37 1.50 1.49   MONOS ABS 10*3/mm3 0.75 0.78 0.86 0.83 0.78 0.99*   EOS ABS 10*3/mm3 0.30 0.28 0.37 0.37 0.35 0.38   BASOS ABS 10*3/mm3 0.07 0.04 0.08 0.05 0.07 0.07   IMMATURE GRANS (ABS) 10*3/mm3 0.04 0.02 0.04 0.03 0.04 0.06*   NRBC /100 WBC 0.0 0.0 0.0 0.0 0.0 0.0       Lab Results - Last 18 Months   Lab Units 08/20/24  0921 05/30/24  0114 01/29/24  1231 06/27/23  1040 03/28/23  0919   GLUCOSE mg/dL 112* 112* 87 119* 99   SODIUM mmol/L 141 136 142 139 141   POTASSIUM mmol/L 4.1 4.1 3.8 4.7 3.8   CO2 mmol/L 28.0 30.0* 32.0* 29.0 32.0*   CHLORIDE mmol/L 101 95* 101 98 98   ANION GAP mmol/L 12.0 11.0 9.0 12.0 11.0   CREATININE mg/dL 0.77 1.24 0.99 1.05 1.01   BUN mg/dL 24* 19 23* 23* 20   BUN / CREAT RATIO  31.2* 15.3 23.2 21.9 19.8   CALCIUM mg/dL 9.4 9.1 8.8 9.5 9.6   ALK PHOS U/L 63 69 46 55 55   TOTAL PROTEIN g/dL 7.1 7.9 6.9 7.6 7.4   ALT (SGPT) U/L 21 31 22 31 30   AST (SGOT) U/L 19 28 18 19 22   BILIRUBIN mg/dL 0.4 0.3 0.3 0.4 0.3   ALBUMIN g/dL 4.4 4.6 4.3 4.7 4.7   GLOBULIN gm/dL 2.7 3.3 2.6 2.9 2.7       No results for input(s): \"MSPIKE\", \"KAPPALAMB\", \"IGLFLC\", \"URICACID\", \"FREEKAPPAL\", \"CEA\", \"LDH\", \"REFLABREPO\" in the last [id number] hours.      Lab Results - Last 18 Months   Lab Units 08/20/24  0921 04/15/24  1010 01/29/24  1231 06/27/23  1040 03/28/23  0919   IRON mcg/dL 83  --   --   --   --    TIBC mcg/dL 392  --   --   --   --    IRON SATURATION (TSAT) % 21  --   --   --   --    FERRITIN ng/mL 72.29 57.61 138.80 54.69 87.22         PAST MEDICAL HISTORY:  ALLERGIES:  Allergies   Allergen Reactions    Adhesive Tape Rash     VERY SENSITIVE TO ADHESIVE     Latex Rash and Unknown - High Severity     CURRENT MEDICATIONS:  Outpatient Encounter Medications as of 8/20/2024   Medication Sig " Dispense Refill    acetaminophen (TYLENOL) 500 MG tablet Take 1 tablet by mouth Every 6 (Six) Hours As Needed for Mild Pain.      albuterol (PROVENTIL) (2.5 MG/3ML) 0.083% nebulizer solution Take 2.5 mg by nebulization 3 (Three) Times a Day. 90 each 1    allopurinol (ZYLOPRIM) 100 MG tablet TAKE ONE (1) TABLET BY MOUTH DAILY. 90 tablet 10    amoxicillin-clavulanate (AUGMENTIN) 875-125 MG per tablet Take 1 tablet by mouth 2 (Two) Times a Day. 20 tablet 0    Azelastine HCl 137 MCG/SPRAY solution 2 sprays into the nostril(s) as directed by provider 2 (Two) Times a Day. 60 mL 5    azithromycin (ZITHROMAX) 250 MG tablet       Budeson-Glycopyrrol-Formoterol (Breztri Aerosphere) 160-9-4.8 MCG/ACT aerosol inhaler Inhale 2 puffs 2 (Two) Times a Day. 10.7 g 10    buPROPion XL (WELLBUTRIN XL) 150 MG 24 hr tablet TAKE ONE (1) TABLET BY MOUTH DAILY. 90 tablet 10    busPIRone (BUSPAR) 15 MG tablet TAKE 1/2 (ONE-HALF) TABLET BY MOUTH 2 (TWO) TIMES A DAY. 30 tablet 3    doxycycline (VIBRAMYCIN) 100 MG capsule       fluticasone (FLONASE) 50 MCG/ACT nasal spray 2 sprays into the nostril(s) as directed by provider Daily. 16 g 5    gemfibrozil (LOPID) 600 MG tablet TAKE ONE (1) TABLET BY MOUTH DAILY. NEEDS OFFICE APPT. 90 tablet 4    hydroCHLOROthiazide 25 MG tablet TAKE ONE (1) TABLET BY MOUTH DAILY 90 tablet 10    ibuprofen (ADVIL,MOTRIN) 600 MG tablet Take 1 tablet by mouth Every 6 (Six) Hours As Needed for Mild Pain.      loratadine (CLARITIN) 10 MG tablet Take 1 tablet by mouth Daily. 90 tablet 3    losartan (COZAAR) 25 MG tablet TAKE ONE (1) TABLET BY MOUTH DAILY. 90 tablet 4    methylPREDNISolone (MEDROL) 4 MG dose pack Take as directed on package instructions. 1 each 0    pantoprazole (PROTONIX) 40 MG EC tablet TAKE ONE (1) TABLET BY MOUTH DAILY 90 tablet 3    predniSONE (DELTASONE) 10 MG tablet       tadalafil (Cialis) 20 MG tablet Take 1 tablet by mouth Daily As Needed for Erectile Dysfunction. 30 tablet 1    Ventolin   (90 Base) MCG/ACT inhaler INHALE 2 PUFFS EVERY 4 (FOUR) HOURS AS NEEDED FOR WHEEZING OR SHORTNESS OF AIR. 18 g 5     No facility-administered encounter medications on file as of 8/20/2024.     ADULT ILLNESSES:  Patient Active Problem List   Diagnosis Code    Stridor-infrequent R06.1    Jelani v cord paresis (resser) ([place]) J38.02    Charcot-Claire-Tooth disease G60.0    Gastroesophageal reflux disease K21.9    Personal history of nicotine dependence Z87.891    Chronic neck pain M54.2, G89.29    Spinal stenosis in cervical region M48.02    Cervical radiculopathy M54.12    Chronic right shoulder pain M25.511, G89.29    Wellness examination Z00.00    Gout M10.9    History of chronic respiratory failure Z87.09    Gait difficulty R26.9    Laboratory test Z01.89    Hypertriglyceridemia E78.1    *Hx iron deficiency E61.1    Hyperuricemia-zyloprim E79.0    Heme positive stool R19.5    DARRIN (obstructive sleep apnea) G47.33    Hypertension I10    High serum ferritin-see hemachromatosis R79.89    History of COVID-19 Z86.16    Carrier of hemochromatosis HFE gene mutation (offer referral) Z14.8    Fatty liver K76.0    Coronary artery calcification on CT chest  I25.10, I25.84    Family history of early CAD grandmother  Z82.49    Other hemochromatosis E83.118    Elevated fasting glucose R73.01    Erectile dysfunction N52.9    COPD (chronic obstructive pulmonary disease) J44.9    Non-seasonal allergic rhinitis J30.89    Obesity (BMI 30-39.9) E66.9    Other specified anxiety disorders F41.8    Nocturnal hypoxemia G47.34    Restrictive lung disease J98.4    Former smoker Z87.891    Hoarseness R49.0    Dependence on nocturnal oxygen therapy Z99.81     SURGERIES:  Past Surgical History:   Procedure Laterality Date    BICEPS TENDONESIS SUBPECTORALIS REPAIR Right 6/28/2019    Procedure: BICEPS TENODESIS - RIGHT;  Surgeon: Yeison Agudelo MD;  Location: [facility] OR;  Service: Orthopedics    COLONOSCOPY N/A 2/17/2020    Procedure: COLONOSCOPY  WITH ANESTHESIA;  Surgeon: Cal Vela DO;  Location: [facility] ENDOSCOPY;  Service: Gastroenterology;  Laterality: N/A;  preop; blood in stool  postop; polyps   PCP anand Jackson     ENDOSCOPY N/A 2/25/2020    Procedure: ESOPHAGOGASTRODUODENOSCOPY WITH ANESTHESIA;  Surgeon: Cal Vela DO;  Location: [facility] ENDOSCOPY;  Service: Gastroenterology;  Laterality: N/A;  preop; blood in stool  postop; normal   PCP Anand Jackson     SHOULDER ARTHROSCOPY W/ ROTATOR CUFF REPAIR Right 6/28/2019    Procedure: RIGHT SHOULDER ARTHROSCOPIC ROTATOR CUFF REPAIR, SUBACROMIAL DECOMPRESSION;  Surgeon: Yeison Agudelo MD;  Location: [facility] OR;  Service: Orthopedics     HEALTH MAINTENANCE ITEMS:  Health Maintenance Due   Topic Date Due    TDAP/TD VACCINES (1 - Tdap) Never done    ZOSTER VACCINE (1 of 2) Never done    ANNUAL WELLNESS VISIT  Never done    LIPID PANEL  07/05/2023    COVID-19 Vaccine (3 - 2023-24 season) 09/01/2023    INFLUENZA VACCINE  08/01/2024    COLORECTAL CANCER SCREENING  02/17/2025       <no information>  Last Completed Colonoscopy       This patient has no relevant Health Maintenance data.          Immunization History   Administered Date(s) Administered    COVID-19 (MODERNA) 1st,2nd,3rd Dose Monovalent 07/06/2021, 08/06/2021    Pneumococcal Conjugate 20-Valent (PCV20) 09/01/2023     Last Completed Mammogram       This patient has no relevant Health Maintenance data.              FAMILY HISTORY:  Family History   Problem Relation Age of Onset    Cancer Mother     Diabetes Mother     Colon polyps Mother     Hyperlipidemia Father     Hypertension Father     Cancer Father     Colon cancer Neg Hx      SOCIAL HISTORY:  Social History     Socioeconomic History    Marital status: Single    Years of education: 10    Highest education level: GED or equivalent   Tobacco Use    Smoking status: Former     Current packs/day: 0.00     Average packs/day: 1 pack/day for 32.7 years (32.7 ttl pk-yrs)     Types: Cigars,  "Cigarettes     Start date:      Quit date: 10/2015     Years since quittin.8     Passive exposure: Past    Smokeless tobacco: Former     Quit date:    Vaping Use    Vaping status: Never Used   Substance and Sexual Activity    Alcohol use: Yes     Alcohol/week: 3.0 standard drinks of alcohol     Types: 3 Cans of beer per week     Comment: weekly    Drug use: Yes     Types: Marijuana    Sexual activity: Yes     Partners: Female       REVIEW OF SYSTEMS:  Review of Systems   Constitutional:  Positive for fatigue. Negative for activity change, appetite change, fever, unexpected weight gain and unexpected weight loss.   HENT:  Negative for dental problem, facial swelling, swollen glands and trouble swallowing.    Eyes:  Negative for double vision and discharge.   Respiratory:  Positive for shortness of breath. Negative for cough and wheezing.    Cardiovascular:  Negative for chest pain, palpitations and leg swelling.   Gastrointestinal:  Negative for abdominal pain, blood in stool, nausea and vomiting.   Endocrine: Negative.    Genitourinary:  Negative for dysuria and hematuria.   Musculoskeletal:  Positive for gait problem. Negative for arthralgias and myalgias.   Skin:  Negative for rash, skin lesions and wound.   Allergic/Immunologic: Negative for immunocompromised state.   Neurological:  Negative for speech difficulty, light-headedness, headache, memory problem and confusion.   Hematological:  Negative for adenopathy.   Psychiatric/Behavioral:  Negative for self-injury, suicidal ideas and depressed mood. The patient is not nervous/anxious.          /64   Pulse 89   Temp 97.2 °F (36.2 °C)   Resp 18   Ht 167.6 cm (65.98\")   Wt 86.6 kg (191 lb)   SpO2 94%   BMI 30.84 kg/m²  Body surface area is 1.96 meters squared.       Physical Exam  Constitutional:       Appearance: Normal appearance.   HENT:      Head: Normocephalic and atraumatic.   Eyes:      Extraocular Movements: Extraocular movements " intact.   Cardiovascular:      Rate and Rhythm: Normal rate and regular rhythm.   Pulmonary:      Effort: Pulmonary effort is normal.      Breath sounds: Normal breath sounds.   Abdominal:      General: Abdomen is flat.      Palpations: Abdomen is soft.   Musculoskeletal:      Comments: Abnormal gait r/t CMT.  Uses cane for ambulation  Contracted hands     Skin:     General: Skin is warm and dry.   Neurological:      General: No focal deficit present.      Mental Status: He is alert and oriented to person, place, and time.      Deep Tendon Reflexes: Reflexes normal.         Iam Gibbs reports a pain score of 0. No intervention indicated.        ASSESSMENT / PLAN    1. Hemochromatosis associated with compound heterozygous mutation in HFE gene    2. Hypertension, unspecified type    3. Charcot-Claire-Tooth disease           ASSESSMENT:   1.  Hemochromatosis   -Pt has compound Heterozygous Hemochromotosis one copy of C282Y and one copy of S65C.  -He also has Charcot-Claire Tooth Disease.  -Liver Function Tests have been normal.   -Labs:  Hgb 15.6, Hct 46.1, Ferritin 72  -Goal is Ferritin < 50  -Will continue with phlebotomy today for 500 cc whole blood.       2. Hypertension  -BP stable today 138/64  -Pt is taking HCTZ, and Losartan  -Managed by PCP    3.  Charcot-Claire-Tooth Disease  -Abnormal gait, contracted hands  -Managed by neurology     PLAN:  -We will perform therapeutic phlebotomy today.    -500 cc whole blood was removed.  Pt tolerated phlebotomy well w/o s/s of hypovolemia or hypotension.   -We will continue to perform phlebotomy every 3 months for ferritin > 50.    - He will RTC for visit with provider in 6 months. Pre office labs for CBC, CMP, Ferritin   -He will continue all medications and treatment plans per PCP and other providers.   -Care discussed with patient.  Understanding expressed.  Patient agreeable with plan.    PHLEBOTOMY TODAY        Nat Paez, RUEL  08/20/2024

## 2024-09-25 DIAGNOSIS — F41.9 ANXIETY: ICD-10-CM

## 2024-09-25 DIAGNOSIS — E78.1 HYPERTRIGLYCERIDEMIA: ICD-10-CM

## 2024-09-25 RX ORDER — BUSPIRONE HYDROCHLORIDE 15 MG/1
TABLET ORAL
Qty: 30 TABLET | Refills: 3 | Status: SHIPPED | OUTPATIENT
Start: 2024-09-25

## 2024-10-08 ENCOUNTER — OFFICE VISIT (OUTPATIENT)
Dept: PULMONOLOGY | Facility: CLINIC | Age: 59
End: 2024-10-08
Payer: MEDICARE

## 2024-11-27 DIAGNOSIS — J30.89 NON-SEASONAL ALLERGIC RHINITIS, UNSPECIFIED TRIGGER: ICD-10-CM

## 2024-11-27 DIAGNOSIS — F41.9 ANXIETY: ICD-10-CM

## 2024-11-27 DIAGNOSIS — E78.1 HYPERTRIGLYCERIDEMIA: ICD-10-CM

## 2024-11-27 DIAGNOSIS — J44.9 CHRONIC OBSTRUCTIVE PULMONARY DISEASE, UNSPECIFIED COPD TYPE: Primary | ICD-10-CM

## 2024-11-27 RX ORDER — LORATADINE 10 MG/1
10 TABLET ORAL DAILY
Qty: 90 TABLET | Refills: 3 | Status: SHIPPED | OUTPATIENT
Start: 2024-11-27

## 2024-11-27 RX ORDER — GEMFIBROZIL 600 MG/1
600 TABLET, FILM COATED ORAL 2 TIMES DAILY
Qty: 90 TABLET | Refills: 5 | Status: SHIPPED | OUTPATIENT
Start: 2024-11-27

## 2024-11-27 RX ORDER — FLUTICASONE PROPIONATE 50 MCG
2 SPRAY, SUSPENSION (ML) NASAL DAILY
Qty: 16 G | Refills: 5 | Status: SHIPPED | OUTPATIENT
Start: 2024-11-27

## 2024-11-27 RX ORDER — BUDESONIDE, GLYCOPYRROLATE, AND FORMOTEROL FUMARATE 160; 9; 4.8 UG/1; UG/1; UG/1
2 AEROSOL, METERED RESPIRATORY (INHALATION) 2 TIMES DAILY
Qty: 10.7 G | Refills: 10 | Status: SHIPPED | OUTPATIENT
Start: 2024-11-27

## 2024-11-27 RX ORDER — LOSARTAN POTASSIUM 25 MG/1
25 TABLET ORAL DAILY
Qty: 90 TABLET | Refills: 4 | Status: SHIPPED | OUTPATIENT
Start: 2024-11-27

## 2024-11-27 RX ORDER — BUSPIRONE HYDROCHLORIDE 15 MG/1
15 TABLET ORAL 2 TIMES DAILY
Qty: 90 TABLET | Refills: 3 | Status: SHIPPED | OUTPATIENT
Start: 2024-11-27

## 2024-11-27 RX ORDER — AZELASTINE HYDROCHLORIDE 137 UG/1
2 SPRAY, METERED NASAL 2 TIMES DAILY
Qty: 60 ML | Refills: 5 | Status: SHIPPED | OUTPATIENT
Start: 2024-11-27

## 2024-11-27 NOTE — TELEPHONE ENCOUNTER
Received a request through [employer] for refills.    Per protocol no cosign required, script sent to pharmacy.    Rx Refill Note  Requested Prescriptions     Pending Prescriptions Disp Refills    Budeson-Glycopyrrol-Formoterol (Breztri Aerosphere) 160-9-4.8 MCG/ACT aerosol inhaler 10.7 g 10     Sig: Inhale 2 puffs 2 (Two) Times a Day.    Azelastine HCl 137 MCG/SPRAY solution 60 mL 5     Sig: Administer 2 sprays into the nostril(s) as directed by provider 2 (Two) Times a Day.    fluticasone (FLONASE) 50 MCG/ACT nasal spray 16 g 5     Sig: Administer 2 sprays into the nostril(s) as directed by provider Daily.    loratadine (CLARITIN) 10 MG tablet 90 tablet 3     Sig: Take 1 tablet by mouth Daily.      Last office visit with prescribing clinician: 7/17/2024   Last telemedicine visit with prescribing clinician: Visit date not found   Next office visit with prescribing clinician: 2/11/2025                         Would you like a call back once the refill request has been completed: [] Yes [] No    If the office needs to give you a call back, can they leave a voicemail: [] Yes [] No    Terri Tovar MA  11/27/24, 09:06 CST

## 2024-11-27 NOTE — TELEPHONE ENCOUNTER
Rx Refill Note  Requested Prescriptions     Pending Prescriptions Disp Refills    losartan (COZAAR) 25 MG tablet 90 tablet 4     Sig: Take 1 tablet by mouth Daily.    gemfibrozil (LOPID) 600 MG tablet 90 tablet 4    busPIRone (BUSPAR) 15 MG tablet 30 tablet 3      Last office visit with prescribing clinician: 4/7/2023   Last telemedicine visit with prescribing clinician: Visit date not found   Next office visit with prescribing clinician: Visit date not found     Jorge Villanueva MA  11/27/24, 08:20 CST

## 2025-03-06 ENCOUNTER — LAB (OUTPATIENT)
Dept: LAB | Facility: HOSPITAL | Age: 60
End: 2025-03-06
Payer: MEDICARE

## 2025-03-06 ENCOUNTER — OFFICE VISIT (OUTPATIENT)
Dept: ONCOLOGY | Facility: CLINIC | Age: 60
End: 2025-03-06
Payer: MEDICARE

## 2025-03-06 VITALS
HEIGHT: 66 IN | WEIGHT: 197.5 LBS | OXYGEN SATURATION: 94 % | BODY MASS INDEX: 31.74 KG/M2 | DIASTOLIC BLOOD PRESSURE: 88 MMHG | HEART RATE: 101 BPM | SYSTOLIC BLOOD PRESSURE: 124 MMHG | TEMPERATURE: 97.2 F | RESPIRATION RATE: 16 BRPM

## 2025-03-06 DIAGNOSIS — G60.0 CHARCOT-MARIE-TOOTH DISEASE: ICD-10-CM

## 2025-03-06 DIAGNOSIS — I10 HYPERTENSION, UNSPECIFIED TYPE: ICD-10-CM

## 2025-03-06 DIAGNOSIS — E83.110 HEMOCHROMATOSIS ASSOCIATED WITH COMPOUND HETEROZYGOUS MUTATION IN HFE GENE: ICD-10-CM

## 2025-03-06 DIAGNOSIS — E83.110 HEMOCHROMATOSIS ASSOCIATED WITH COMPOUND HETEROZYGOUS MUTATION IN HFE GENE: Primary | ICD-10-CM

## 2025-03-06 LAB
ALBUMIN SERPL-MCNC: 4.3 G/DL (ref 3.5–5.2)
ALBUMIN/GLOB SERPL: 1.7 G/DL
ALP SERPL-CCNC: 53 U/L (ref 39–117)
ALT SERPL W P-5'-P-CCNC: 28 U/L (ref 1–41)
ANION GAP SERPL CALCULATED.3IONS-SCNC: 11 MMOL/L (ref 5–15)
AST SERPL-CCNC: 22 U/L (ref 1–40)
BASOPHILS # BLD AUTO: 0.09 10*3/MM3 (ref 0–0.2)
BASOPHILS NFR BLD AUTO: 1.1 % (ref 0–1.5)
BILIRUB SERPL-MCNC: 0.5 MG/DL (ref 0–1.2)
BUN SERPL-MCNC: 13 MG/DL (ref 6–20)
BUN/CREAT SERPL: 12 (ref 7–25)
CALCIUM SPEC-SCNC: 9 MG/DL (ref 8.6–10.5)
CHLORIDE SERPL-SCNC: 98 MMOL/L (ref 98–107)
CO2 SERPL-SCNC: 30 MMOL/L (ref 22–29)
CREAT SERPL-MCNC: 1.08 MG/DL (ref 0.76–1.27)
DEPRECATED RDW RBC AUTO: 42.2 FL (ref 37–54)
EGFRCR SERPLBLD CKD-EPI 2021: 79.5 ML/MIN/1.73
EOSINOPHIL # BLD AUTO: 0.24 10*3/MM3 (ref 0–0.4)
EOSINOPHIL NFR BLD AUTO: 3 % (ref 0.3–6.2)
ERYTHROCYTE [DISTWIDTH] IN BLOOD BY AUTOMATED COUNT: 12.7 % (ref 12.3–15.4)
FERRITIN SERPL-MCNC: 90.27 NG/ML (ref 30–400)
GLOBULIN UR ELPH-MCNC: 2.5 GM/DL
GLUCOSE SERPL-MCNC: 95 MG/DL (ref 65–99)
HCT VFR BLD AUTO: 46.4 % (ref 37.5–51)
HGB BLD-MCNC: 15.7 G/DL (ref 13–17.7)
IMM GRANULOCYTES # BLD AUTO: 0.05 10*3/MM3 (ref 0–0.05)
IMM GRANULOCYTES NFR BLD AUTO: 0.6 % (ref 0–0.5)
IRON 24H UR-MRATE: 105 MCG/DL (ref 59–158)
IRON SATN MFR SERPL: 32 % (ref 20–50)
LYMPHOCYTES # BLD AUTO: 1.4 10*3/MM3 (ref 0.7–3.1)
LYMPHOCYTES NFR BLD AUTO: 17.3 % (ref 19.6–45.3)
MCH RBC QN AUTO: 30.7 PG (ref 26.6–33)
MCHC RBC AUTO-ENTMCNC: 33.8 G/DL (ref 31.5–35.7)
MCV RBC AUTO: 90.6 FL (ref 79–97)
MONOCYTES # BLD AUTO: 0.93 10*3/MM3 (ref 0.1–0.9)
MONOCYTES NFR BLD AUTO: 11.5 % (ref 5–12)
NEUTROPHILS NFR BLD AUTO: 5.4 10*3/MM3 (ref 1.7–7)
NEUTROPHILS NFR BLD AUTO: 66.5 % (ref 42.7–76)
NRBC BLD AUTO-RTO: 0 /100 WBC (ref 0–0.2)
PLATELET # BLD AUTO: 228 10*3/MM3 (ref 140–450)
PMV BLD AUTO: 10.2 FL (ref 6–12)
POTASSIUM SERPL-SCNC: 3.8 MMOL/L (ref 3.5–5.2)
PROT SERPL-MCNC: 6.8 G/DL (ref 6–8.5)
RBC # BLD AUTO: 5.12 10*6/MM3 (ref 4.14–5.8)
SODIUM SERPL-SCNC: 139 MMOL/L (ref 136–145)
TIBC SERPL-MCNC: 331 MCG/DL (ref 298–536)
TRANSFERRIN SERPL-MCNC: 222 MG/DL (ref 200–360)
WBC NRBC COR # BLD AUTO: 8.11 10*3/MM3 (ref 3.4–10.8)

## 2025-03-06 PROCEDURE — 80053 COMPREHEN METABOLIC PANEL: CPT

## 2025-03-06 PROCEDURE — 85025 COMPLETE CBC W/AUTO DIFF WBC: CPT

## 2025-03-06 PROCEDURE — 84466 ASSAY OF TRANSFERRIN: CPT

## 2025-03-06 PROCEDURE — 83540 ASSAY OF IRON: CPT

## 2025-03-06 PROCEDURE — 36415 COLL VENOUS BLD VENIPUNCTURE: CPT

## 2025-03-06 PROCEDURE — 82728 ASSAY OF FERRITIN: CPT

## 2025-03-06 NOTE — PROGRESS NOTES
[facility] GROUP HEMATOLOGY & ONCOLOGY  [address]  [facility] [address] [phone]    Patient Name: Iam Gibbs  Encounter Date: 03/06/2025  YOB: 1965  Patient Number: [id number]    Hematology / Oncology Progress Note    HPI / REASON FOR VISIT: Iam Gibbs is a 57 y.o. 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   He is being treated with therapeutic phlebotomy q 2 weeks until his ferritin is <50.  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       His LFT have been normal although he does drink beer regularly.  Low dose CT of chest on 01/03/22 showed no acute processes in the upper abdomen.  May 26, 2022 CT Abdomen showed 1. Fatty infiltration of the liver. 2. Mild splenomegaly.  3. Atheromatous disease of the aortoiliac vessels. Degenerative changes of the visualized spine.  He was referred to Cardiology and saw Dr. Jean on June 9, 2022.  He will have echo and follow-up at that office later this month.    INTERVAL HISTORY     History of Present Illness  The patient presents for evaluation of right-sided abdominal pain.    He reports experiencing intermittent right-sided abdominal pain, which he describes as sharp in nature. This pain occurs both during periods of activity and rest. The onset of this symptom was approximately 1 week ago, but it has not been present recently. He does not believe the pain is related to his hemochromatosis.             LABS    Lab Results - Last 18 Months   Lab Units 03/06/25  1418 08/20/24  0921 05/30/24  0114 04/15/24  1010 01/29/24  1231   HEMOGLOBIN g/dL 15.7 15.6 17.4 15.6 15.0   HEMATOCRIT % 46.4 46.1 52.6* 46.4 44.7   MCV fL 90.6 89.3 90.4 91.3 91.0   WBC 10*3/mm3 8.11 5.81 6.39 6.99 6.32  "  RDW % 12.7 13.3 12.7 12.3 12.7   MPV fL 10.2 10.2 10.5 10.0 10.0   PLATELETS 10*3/mm3 228 234 218 214 213   IMM GRAN % % 0.6* 0.7* 0.3 0.6* 0.5   NEUTROS ABS 10*3/mm3 5.40 3.21 4.04 4.21 3.67   LYMPHS ABS 10*3/mm3 1.40 1.44 1.23 1.43 1.37   MONOS ABS 10*3/mm3 0.93* 0.75 0.78 0.86 0.83   EOS ABS 10*3/mm3 0.24 0.30 0.28 0.37 0.37   BASOS ABS 10*3/mm3 0.09 0.07 0.04 0.08 0.05   IMMATURE GRANS (ABS) 10*3/mm3 0.05 0.04 0.02 0.04 0.03   NRBC /100 WBC 0.0 0.0 0.0 0.0 0.0       Lab Results - Last 18 Months   Lab Units 03/06/25  1418 08/20/24  0921 05/30/24  0114 01/29/24  1231   GLUCOSE mg/dL 95 112* 112* 87   SODIUM mmol/L 139 141 136 142   POTASSIUM mmol/L 3.8 4.1 4.1 3.8   CO2 mmol/L 30.0* 28.0 30.0* 32.0*   CHLORIDE mmol/L 98 101 95* 101   ANION GAP mmol/L 11.0 12.0 11.0 9.0   CREATININE mg/dL 1.08 0.77 1.24 0.99   BUN mg/dL 13 24* 19 23*   BUN / CREAT RATIO  12.0 31.2* 15.3 23.2   CALCIUM mg/dL 9.0 9.4 9.1 8.8   ALK PHOS U/L 53 63 69 46   TOTAL PROTEIN g/dL 6.8 7.1 7.9 6.9   ALT (SGPT) U/L 28 21 31 22   AST (SGOT) U/L 22 19 28 18   BILIRUBIN mg/dL 0.5 0.4 0.3 0.3   ALBUMIN g/dL 4.3 4.4 4.6 4.3   GLOBULIN gm/dL 2.5 2.7 3.3 2.6       No results for input(s): \"MSPIKE\", \"KAPPALAMB\", \"IGLFLC\", \"URICACID\", \"FREEKAPPAL\", \"CEA\", \"LDH\", \"REFLABREPO\" in the last 33901 hours.      Lab Results - Last 18 Months   Lab Units 03/06/25  1418 08/20/24  0921 04/15/24  1010 01/29/24  1231   IRON mcg/dL 105 83  --   --    TIBC mcg/dL 331 392  --   --    IRON SATURATION (TSAT) % 32 21  --   --    FERRITIN ng/mL 90.27 72.29 57.61 138.80         PAST MEDICAL HISTORY:  ALLERGIES:  Allergies   Allergen Reactions    Adhesive Tape Rash     VERY SENSITIVE TO ADHESIVE     Latex Rash and Unknown - High Severity     CURRENT MEDICATIONS:  Outpatient Encounter Medications as of 3/6/2025   Medication Sig Dispense Refill    acetaminophen (TYLENOL) 500 MG tablet Take 1 tablet by mouth Every 6 (Six) Hours As Needed for Mild Pain.      albuterol " (PROVENTIL) (2.5 MG/3ML) 0.083% nebulizer solution Take 2.5 mg by nebulization 3 (Three) Times a Day. 90 each 1    allopurinol (ZYLOPRIM) 100 MG tablet TAKE ONE (1) TABLET BY MOUTH DAILY. 90 tablet 10    Azelastine HCl 137 MCG/SPRAY solution Administer 2 sprays into the nostril(s) as directed by provider 2 (Two) Times a Day. 60 mL 5    Budeson-Glycopyrrol-Formoterol (Breztri Aerosphere) 160-9-4.8 MCG/ACT aerosol inhaler Inhale 2 puffs 2 (Two) Times a Day. 10.7 g 10    buPROPion XL (WELLBUTRIN XL) 150 MG 24 hr tablet TAKE ONE (1) TABLET BY MOUTH DAILY. 90 tablet 10    busPIRone (BUSPAR) 15 MG tablet Take 1 tablet by mouth 2 (Two) Times a Day. 90 tablet 3    doxycycline (VIBRAMYCIN) 100 MG capsule       fluticasone (FLONASE) 50 MCG/ACT nasal spray Administer 2 sprays into the nostril(s) as directed by provider Daily. 16 g 5    gemfibrozil (LOPID) 600 MG tablet Take 1 tablet by mouth 2 (Two) Times a Day. 90 tablet 5    hydroCHLOROthiazide 25 MG tablet TAKE ONE (1) TABLET BY MOUTH DAILY 90 tablet 10    ibuprofen (ADVIL,MOTRIN) 600 MG tablet Take 1 tablet by mouth Every 6 (Six) Hours As Needed for Mild Pain.      losartan (COZAAR) 25 MG tablet Take 1 tablet by mouth Daily. 90 tablet 4    pantoprazole (PROTONIX) 40 MG EC tablet TAKE ONE (1) TABLET BY MOUTH DAILY 90 tablet 3    predniSONE (DELTASONE) 10 MG tablet       tadalafil (Cialis) 20 MG tablet Take 1 tablet by mouth Daily As Needed for Erectile Dysfunction. 30 tablet 1    Ventolin  (90 Base) MCG/ACT inhaler INHALE 2 PUFFS EVERY 4 (FOUR) HOURS AS NEEDED FOR WHEEZING OR SHORTNESS OF AIR. 18 g 5    loratadine (CLARITIN) 10 MG tablet Take 1 tablet by mouth Daily. (Patient not taking: Reported on 3/6/2025) 90 tablet 3    [DISCONTINUED] amoxicillin-clavulanate (AUGMENTIN) 875-125 MG per tablet Take 1 tablet by mouth 2 (Two) Times a Day. (Patient not taking: Reported on 3/6/2025) 20 tablet 0    [DISCONTINUED] azithromycin (ZITHROMAX) 250 MG tablet  (Patient not  taking: Reported on 3/6/2025)      [DISCONTINUED] methylPREDNISolone (MEDROL) 4 MG dose pack Take as directed on package instructions. (Patient not taking: Reported on 3/6/2025) 1 each 0     No facility-administered encounter medications on file as of 3/6/2025.     ADULT ILLNESSES:  Patient Active Problem List   Diagnosis Code    Stridor-infrequent R06.1    Jelani v cord paresis (resser) ([place]) J38.02    Charcot-Claire-Tooth disease G60.0    Gastroesophageal reflux disease K21.9    Personal history of nicotine dependence Z87.891    Chronic neck pain M54.2, G89.29    Spinal stenosis in cervical region M48.02    Cervical radiculopathy M54.12    Chronic right shoulder pain M25.511, G89.29    Wellness examination Z00.00    Gout M10.9    History of chronic respiratory failure Z87.09    Gait difficulty R26.9    Laboratory test Z01.89    Hypertriglyceridemia E78.1    *Hx iron deficiency E61.1    Hyperuricemia-zyloprim E79.0    Heme positive stool R19.5    DARRIN (obstructive sleep apnea) G47.33    Hypertension I10    High serum ferritin-see hemachromatosis R79.89    History of COVID-19 Z86.16    Carrier of hemochromatosis HFE gene mutation (offer referral) Z14.8    Fatty liver K76.0    Coronary artery calcification on CT chest  I25.10    Family history of early CAD grandmother  Z82.49    Other hemochromatosis E83.118    Elevated fasting glucose R73.01    Erectile dysfunction N52.9    COPD (chronic obstructive pulmonary disease) J44.9    Non-seasonal allergic rhinitis J30.89    Obesity (BMI 30-39.9) E66.9    Other specified anxiety disorders F41.8    Nocturnal hypoxemia G47.34    Restrictive lung disease J98.4    Former smoker Z87.891    Hoarseness R49.0    Dependence on nocturnal oxygen therapy Z99.81     SURGERIES:  Past Surgical History:   Procedure Laterality Date    BICEPS TENDONESIS SUBPECTORALIS REPAIR Right 6/28/2019    Procedure: BICEPS TENODESIS - RIGHT;  Surgeon: Yeison Agudelo MD;  Location: [facility] OR;  Service:  Orthopedics    COLONOSCOPY N/A 2/17/2020    Procedure: COLONOSCOPY WITH ANESTHESIA;  Surgeon: Cal Vela DO;  Location: [facility] ENDOSCOPY;  Service: Gastroenterology;  Laterality: N/A;  preop; blood in stool  postop; polyps   PCP anand Jackson     ENDOSCOPY N/A 2/25/2020    Procedure: ESOPHAGOGASTRODUODENOSCOPY WITH ANESTHESIA;  Surgeon: Cal Vela DO;  Location: [facility] ENDOSCOPY;  Service: Gastroenterology;  Laterality: N/A;  preop; blood in stool  postop; normal   PCP Anand Jackson     SHOULDER ARTHROSCOPY W/ ROTATOR CUFF REPAIR Right 6/28/2019    Procedure: RIGHT SHOULDER ARTHROSCOPIC ROTATOR CUFF REPAIR, SUBACROMIAL DECOMPRESSION;  Surgeon: Yeison Agudelo MD;  Location: [facility] OR;  Service: Orthopedics     HEALTH MAINTENANCE ITEMS:  Health Maintenance Due   Topic Date Due    TDAP/TD VACCINES (1 - Tdap) Never done    ZOSTER VACCINE (1 of 2) Never done    ANNUAL WELLNESS VISIT  Never done    LIPID PANEL  07/05/2023    INFLUENZA VACCINE  Never done    COVID-19 Vaccine (3 - 2024-25 season) 09/01/2024    BMI FOLLOWUP  09/14/2024    COLORECTAL CANCER SCREENING  02/17/2025    LUNG CANCER SCREENING  03/29/2025       <no information>  Last Completed Colonoscopy    This patient has no relevant Health Maintenance data.       Immunization History   Administered Date(s) Administered    COVID-19 (MODERNA) 1st,2nd,3rd Dose Monovalent 07/06/2021, 08/06/2021    Pneumococcal Conjugate 20-Valent (PCV20) 09/01/2023     Last Completed Mammogram    This patient has no relevant Health Maintenance data.           FAMILY HISTORY:  Family History   Problem Relation Age of Onset    Cancer Mother     Diabetes Mother     Colon polyps Mother     Hyperlipidemia Father     Hypertension Father     Cancer Father     Colon cancer Neg Hx      SOCIAL HISTORY:  Social History     Socioeconomic History    Marital status: Single    Years of education: 10    Highest education level: GED or equivalent   Tobacco Use    Smoking status: Former  "    Current packs/day: 0.00     Average packs/day: 1 pack/day for 32.7 years (32.7 ttl pk-yrs)     Types: Cigars, Cigarettes     Start date:      Quit date: 10/2015     Years since quittin.4     Passive exposure: Past    Smokeless tobacco: Former     Quit date:    Vaping Use    Vaping status: Never Used   Substance and Sexual Activity    Alcohol use: Yes     Alcohol/week: 3.0 standard drinks of alcohol     Types: 3 Cans of beer per week     Comment: weekly    Drug use: Yes     Types: Marijuana    Sexual activity: Yes     Partners: Female       REVIEW OF SYSTEMS:  Review of Systems   Constitutional:  Positive for fatigue. Negative for activity change, appetite change, fever, unexpected weight gain and unexpected weight loss.   HENT:  Negative for dental problem, facial swelling, swollen glands and trouble swallowing.    Eyes:  Negative for double vision and discharge.   Respiratory:  Positive for shortness of breath. Negative for cough and wheezing.    Cardiovascular:  Negative for chest pain, palpitations and leg swelling.   Gastrointestinal:  Negative for abdominal pain, blood in stool, nausea and vomiting.   Endocrine: Negative.    Genitourinary:  Negative for dysuria and hematuria.   Musculoskeletal:  Positive for gait problem. Negative for arthralgias and myalgias.   Skin:  Negative for rash, skin lesions and wound.   Allergic/Immunologic: Negative for immunocompromised state.   Neurological:  Negative for speech difficulty, light-headedness, headache, memory problem and confusion.   Hematological:  Negative for adenopathy.   Psychiatric/Behavioral:  Negative for self-injury, suicidal ideas and depressed mood. The patient is not nervous/anxious.          /88   Pulse 101   Temp 97.2 °F (36.2 °C)   Resp 16   Ht 167.6 cm (66\")   Wt 89.6 kg (197 lb 8 oz)   SpO2 94%   BMI 31.88 kg/m²  Body surface area is 1.99 meters squared.       Physical Exam  Constitutional:       Appearance: Normal " appearance.   HENT:      Head: Normocephalic and atraumatic.   Eyes:      Extraocular Movements: Extraocular movements intact.   Cardiovascular:      Rate and Rhythm: Normal rate and regular rhythm.   Pulmonary:      Effort: Pulmonary effort is normal.      Breath sounds: Normal breath sounds.   Abdominal:      General: Abdomen is flat.      Palpations: Abdomen is soft.   Musculoskeletal:      Comments: Abnormal gait r/t CMT.  Uses cane for ambulation  Contracted hands     Skin:     General: Skin is warm and dry.   Neurological:      General: No focal deficit present.      Mental Status: He is alert and oriented to person, place, and time.      Deep Tendon Reflexes: Reflexes normal.         Iam Raoul Bernie reports a pain score of 0. No intervention indicated.        ASSESSMENT / PLAN    1. Hemochromatosis associated with compound heterozygous mutation in HFE gene    2. Hypertension, unspecified type    3. Charcot-Claire-Tooth disease       ASSESSMENT:   1.  Hemochromatosis   -Pt has compound Heterozygous Hemochromotosis one copy of C282Y and one copy of S65C.  -He also has Charcot-Claire Tooth Disease.  -Liver Function Tests have been normal.   -Labs:  Ferritin 90   -Goal is Ferritin < 50  -Will continue with phlebotomy today for 500 cc whole blood.     2. Hypertension  -BP stable today   -Pt is taking HCTZ, and Losartan  -Managed by PCP    3.  Charcot-Claire-Tooth Disease  -Abnormal gait, contracted hands  -Managed by neurology     PLAN:  -We will perform therapeutic phlebotomy today.    -500 cc whole blood was removed.  Pt tolerated phlebotomy well w/o s/s of hypovolemia or hypotension.   -We will continue to perform phlebotomy every 3 months for ferritin > 50.    - He will RTC for visit with provider in 6 months. Pre office labs for CBC, CMP, Ferritin   -He will continue all medications and treatment plans per PCP and other providers.   -Care discussed with patient.  Understanding expressed.  Patient agreeable  with plan.    PHLEBOTOMY TODAY        Nta Paez, APRN  03/06/2025

## 2025-03-07 ENCOUNTER — PATIENT ROUNDING (BHMG ONLY) (OUTPATIENT)
Dept: ONCOLOGY | Facility: CLINIC | Age: 60
End: 2025-03-07
Payer: MEDICARE

## 2025-03-07 NOTE — PROGRESS NOTES
March 7, 2025    Hello, may I speak with Iam Gibbs?    My name is Jacqueline      I am  with [facility]EMATOLOGY & ONCOLOGY  [address]  [place] [address] [phone].    Before we get started may I verify your date of birth? 1965    I am calling to officially welcome you to our practice and ask about your recent visit. Is this a good time to talk? yes    Tell me about your visit with us. What things went well?  It went great, like it always does. Every office I go to at [facility] is great.        We're always looking for ways to make our patients' experiences even better. Do you have recommendations on ways we may improve?  no    Overall were you satisfied with your first visit to our practice? yes       I appreciate you taking the time to speak with me today. Is there anything else I can do for you? no      Thank you, and have a great day.

## 2025-04-25 ENCOUNTER — HOSPITAL ENCOUNTER (OUTPATIENT)
Dept: CT IMAGING | Facility: HOSPITAL | Age: 60
Discharge: HOME | End: 2025-04-25
Payer: MEDICARE

## 2025-04-25 ENCOUNTER — RESULTS FOLLOW-UP (OUTPATIENT)
Dept: PULMONOLOGY | Facility: CLINIC | Age: 60
End: 2025-04-25
Payer: MEDICARE

## 2025-04-25 DIAGNOSIS — J44.9 CHRONIC OBSTRUCTIVE PULMONARY DISEASE, UNSPECIFIED COPD TYPE: ICD-10-CM

## 2025-04-25 DIAGNOSIS — Z87.891 FORMER SMOKER: ICD-10-CM

## 2025-04-25 PROCEDURE — 71271 CT THORAX LUNG CANCER SCR C-: CPT

## 2025-04-29 ENCOUNTER — TELEPHONE (OUTPATIENT)
Dept: GASTROENTEROLOGY | Facility: CLINIC | Age: 60
End: 2025-04-29
Payer: MEDICARE

## 2025-04-29 NOTE — TELEPHONE ENCOUNTER
Called Iam Gibbs  to reschedule missed appointment. No answer, left message to call to reschedule at [phone]

## 2025-05-13 ENCOUNTER — OFFICE VISIT (OUTPATIENT)
Dept: PULMONOLOGY | Facility: CLINIC | Age: 60
End: 2025-05-13
Payer: MEDICARE

## 2025-05-13 VITALS
DIASTOLIC BLOOD PRESSURE: 84 MMHG | SYSTOLIC BLOOD PRESSURE: 132 MMHG | BODY MASS INDEX: 30.6 KG/M2 | HEIGHT: 66 IN | WEIGHT: 190.4 LBS | OXYGEN SATURATION: 94 % | HEART RATE: 102 BPM

## 2025-05-13 DIAGNOSIS — Z87.891 FORMER SMOKER: ICD-10-CM

## 2025-05-13 DIAGNOSIS — G60.0 CHARCOT-MARIE-TOOTH DISEASE: ICD-10-CM

## 2025-05-13 DIAGNOSIS — Z86.16 HISTORY OF COVID-19: ICD-10-CM

## 2025-05-13 DIAGNOSIS — F41.8 OTHER SPECIFIED ANXIETY DISORDERS: ICD-10-CM

## 2025-05-13 DIAGNOSIS — G47.34 NOCTURNAL HYPOXEMIA: ICD-10-CM

## 2025-05-13 DIAGNOSIS — G47.33 OSA (OBSTRUCTIVE SLEEP APNEA): ICD-10-CM

## 2025-05-13 DIAGNOSIS — J98.4 RESTRICTIVE LUNG DISEASE: ICD-10-CM

## 2025-05-13 DIAGNOSIS — E83.118 OTHER HEMOCHROMATOSIS: ICD-10-CM

## 2025-05-13 DIAGNOSIS — Z99.81 DEPENDENCE ON NOCTURNAL OXYGEN THERAPY: ICD-10-CM

## 2025-05-13 DIAGNOSIS — J30.89 NON-SEASONAL ALLERGIC RHINITIS, UNSPECIFIED TRIGGER: ICD-10-CM

## 2025-05-13 DIAGNOSIS — J44.9 CHRONIC OBSTRUCTIVE PULMONARY DISEASE, UNSPECIFIED COPD TYPE: Primary | ICD-10-CM

## 2025-05-13 DIAGNOSIS — E66.9 OBESITY (BMI 30-39.9): ICD-10-CM

## 2025-05-13 PROCEDURE — 3079F DIAST BP 80-89 MM HG: CPT | Performed by: INTERNAL MEDICINE

## 2025-05-13 PROCEDURE — 3075F SYST BP GE 130 - 139MM HG: CPT | Performed by: INTERNAL MEDICINE

## 2025-05-13 PROCEDURE — 99214 OFFICE O/P EST MOD 30 MIN: CPT | Performed by: INTERNAL MEDICINE

## 2025-05-13 NOTE — TELEPHONE ENCOUNTER
Lipid Panel in past 12 months     Rx Refill Note  Requested Prescriptions     Pending Prescriptions Disp Refills    gemfibrozil (LOPID) 600 MG tablet [Pharmacy Med Name: GEMFIBROZIL 600MG TABLET] 90 tablet 4     Sig: TAKE ONE (1) TABLET BY MOUTH DAILY. NEEDS OFFICE APPT.      Last office visit with office: 06/19/2024  Next office visit with office: none    UDS:     DATE OF LAST REFILL: 11/27/2024 - 5 refills    Controlled Substance Agreement:     YULIA OR JERRODP:          {TIP  Is Refill Pharmacy correct?:  Lisa Meraz MA  05/13/25, 12:51 CDT

## 2025-05-13 NOTE — PROGRESS NOTES
[facility] OUTPATIENT PROGRESS NOTE    Patient: Iam Gibbs  : 1965  Age: 60 y.o.  Date of Service: May 13, 2025    REASON FOR CLINIC VISIT:  Chief Complaint   Patient presents with    Chronic obstructive pulmonary disease, unspecified COPD type       Subjective:    History of Present Illness:  Iam Gibbs is a 60 y.o. male who presents to the office today to be seen for    Diagnosis Plan   1. Chronic obstructive pulmonary disease, unspecified COPD type        2. Dependence on nocturnal oxygen therapy        3. Restrictive lung disease        4. DARRIN (obstructive sleep apnea)        5. Nocturnal hypoxemia        6. Former smoker        7. History of COVID-19        8. Obesity (BMI 30-39.9)        9. Non-seasonal allergic rhinitis, unspecified trigger        10. Other hemochromatosis        11. Charcot-Claire-Tooth disease        12. Other specified anxiety disorders        .  Other problems per record.    History:    Patient is a very pleasant middle-aged  gentleman was seen in the follow-up visit in pulmonary clinic.    Patient has Charcot-Claire-Tooth disease and has problem with mobility and he walks with a cane.  He has obstructive sleep apnea but he does not use any CPAP at night but uses oxygen only 2 L at night.  He had a CT scan of the chest done recently which showed mild emphysema and no lung nodules.  His last PFT done in  showed moderate obstructive airway dysfunction and moderate to severe restrictive dysfunction with preserved diffusion Calcicard for alveolar volume.  Patient is currently on Breztri and albuterol rescue inhaler and his symptoms are well-controlled.  He uses fluticasone nasal spray, steroid nasal spray and loratadine for his nasal allergy.    He is stable and at his time.  He lives alone and he did not have any recent hospital admissions, ER visit, urgent care visit or any other acute respiratory complaints and he did not need any  medication refills.  He is up-to-date on his vaccinations.  He is a former smoker and did quit smoking      PFT done today:  Not done today    PFT Values          2024    13:30   Pre Drug PFT Results   FVC 72   FEV1 76   FEF 25-75% 92   FEV1/FVC 83   Other Tests PFT Results   TLC 69   RV 53   DLCO 104   D/VAsb 135     Results for orders placed in visit on 24    Spirometry with Diffusion Capacity & Lung Volumes    Narrative  Spirometry with Diffusion Capacity & Lung Volumes    Performed by: Cathy Borges, RRT  Authorized by: Shanice Hebert MD  Pre Drug % Predicted  FVC: 72%  FEV1: 76%  FEF 25-75%: 92%  FEV1/FVC: 83%  T%  RV: 53%  DLCO: 104%  D/VAsb: 135%    Interpretation  Overall comments:  The above test results are acceptable and repeatable by ATS criteria  From analysis of the above test results the patient showed evidence of moderate obstructive and restrictive dysfunction but obstructive dysfunction noted due to decrease in FEV1 and FEF 25-75% and resting dysfunction noted due to decrease in total lung capacity.  No bronchodilator challenge was done.  The patient had normal diffusion capacity corrected for alveolar volume.    Apparently had a pulmonary function test done around time ago which is not available for comparison.  Clinical correlation was indicated.    Shanice Hebert MD  Pulmonologist/Intensivist  2024 14:39 CDT         Bronchodilator therapy: Breztrri and Albuterol rescue inhaler    Smoking Status:   Social History     Tobacco Use   Smoking Status Former    Current packs/day: 0.00    Average packs/day: 1 pack/day for 32.7 years (32.7 ttl pk-yrs)    Types: Cigars, Cigarettes    Start date:     Quit date: 10/2015    Years since quittin.6    Passive exposure: Past   Smokeless Tobacco Former    Quit date:      Pulm Rehab: no  Sleep: yes    Support System: lives alone    Code Status:   There are no questions and answers to display.        Review of  Systems:  A complete review of systems is performed and all other systems were reviewed and negative as note above in the HPI.  Review of Systems   Constitutional:  Positive for fatigue. Negative for unexpected weight gain and unexpected weight loss.   HENT:  Positive for congestion, postnasal drip and sinus pressure.    Eyes: Negative.    Respiratory:  Positive for cough, chest tightness and shortness of breath.    Cardiovascular: Negative.    Gastrointestinal: Negative.    Endocrine: Negative.    Genitourinary: Negative.    Musculoskeletal:  Positive for arthralgias and back pain.   Skin: Negative.    Allergic/Immunologic: Positive for environmental allergies.   Neurological: Negative.    Hematological: Negative.    Psychiatric/Behavioral:  The patient is nervous/anxious.        CAT/ACT Score:  Not done today    Medications:  Outpatient Encounter Medications as of 5/13/2025   Medication Sig Dispense Refill    acetaminophen (TYLENOL) 500 MG tablet Take 1 tablet by mouth Every 6 (Six) Hours As Needed for Mild Pain.      albuterol (PROVENTIL) (2.5 MG/3ML) 0.083% nebulizer solution Take 2.5 mg by nebulization 3 (Three) Times a Day. 90 each 1    allopurinol (ZYLOPRIM) 100 MG tablet TAKE ONE (1) TABLET BY MOUTH DAILY. 90 tablet 10    Azelastine HCl 137 MCG/SPRAY solution Administer 2 sprays into the nostril(s) as directed by provider 2 (Two) Times a Day. 60 mL 5    Budeson-Glycopyrrol-Formoterol (Breztri Aerosphere) 160-9-4.8 MCG/ACT aerosol inhaler Inhale 2 puffs 2 (Two) Times a Day. 10.7 g 10    buPROPion XL (WELLBUTRIN XL) 150 MG 24 hr tablet TAKE ONE (1) TABLET BY MOUTH DAILY. 90 tablet 10    busPIRone (BUSPAR) 15 MG tablet Take 1 tablet by mouth 2 (Two) Times a Day. 90 tablet 3    fluticasone (FLONASE) 50 MCG/ACT nasal spray Administer 2 sprays into the nostril(s) as directed by provider Daily. 16 g 5    gemfibrozil (LOPID) 600 MG tablet Take 1 tablet by mouth 2 (Two) Times a Day. 90 tablet 5    hydroCHLOROthiazide  "25 MG tablet TAKE ONE (1) TABLET BY MOUTH DAILY 90 tablet 10    ibuprofen (ADVIL,MOTRIN) 600 MG tablet Take 1 tablet by mouth Every 6 (Six) Hours As Needed for Mild Pain.      loratadine (CLARITIN) 10 MG tablet Take 1 tablet by mouth Daily. 90 tablet 3    losartan (COZAAR) 25 MG tablet Take 1 tablet by mouth Daily. 90 tablet 4    pantoprazole (PROTONIX) 40 MG EC tablet TAKE ONE (1) TABLET BY MOUTH DAILY 90 tablet 3    tadalafil (Cialis) 20 MG tablet Take 1 tablet by mouth Daily As Needed for Erectile Dysfunction. 30 tablet 1    Ventolin  (90 Base) MCG/ACT inhaler INHALE 2 PUFFS EVERY 4 (FOUR) HOURS AS NEEDED FOR WHEEZING OR SHORTNESS OF AIR. 18 g 5    doxycycline (VIBRAMYCIN) 100 MG capsule  (Patient not taking: Reported on 5/13/2025)      predniSONE (DELTASONE) 10 MG tablet  (Patient not taking: Reported on 5/13/2025)       No facility-administered encounter medications on file as of 5/13/2025.       Allergies:  Allergies   Allergen Reactions    Adhesive Tape Rash     VERY SENSITIVE TO ADHESIVE     Latex Rash and Unknown - High Severity       Immunizations:  Immunization History   Administered Date(s) Administered    COVID-19 (MODERNA) 1st,2nd,3rd Dose Monovalent 07/06/2021, 08/06/2021    Pneumococcal Conjugate 20-Valent (PCV20) 09/01/2023       Objective:    Vitals:  /84   Pulse 102   Ht 167.6 cm (66\")   Wt 86.4 kg (190 lb 6.4 oz)   SpO2 94% Comment: RA  BMI 30.73 kg/m²     Physical Exam:  General: Patient is a 60 y.o. pleasant middle aged  male. Looks stated age. Appears to be in no acute distress.  Eyes: EOMI. PERRLA. Vision intact. No scleral icterus.  Ear, Nose, Mouth and Throat: Hearing is grossly intact. No Leukoplakia, pharyngitis, stomatitis or thrush. Swollen nasal mucosa with post nasal drop.  Neck: Range of motion of neck normal. No thyromegaly or masses. Mallampati Class 3  Respiratory: Clear to auscultation bilaterally. No use of accessory muscles. Decreased breath " sounds.  Cardiovascular: Normal heart sounds. Regularly regular rhythm without murmur.  Gastrointestinal: Non tender, non distended, soft. Bowel sounds positive in all four quadrants. No organomegaly.  Skin: No obvious rashes, lesions, ulcers or large amount of bruising. No edema.   Neurological: No new motor deficits. Cranial nerves appear intact.  Psychiatric: Patient is alert and oriented to person, place and time.    Chest Imaging:    Study Result    Narrative & Impression   EXAM/TECHNIQUE: CT chest without contrast, low dose protocol     INDICATION: COPD Former smoker; J44.9-Chronic obstructive pulmonary  disease, unspecified; Z87.891-Personal history of nicotine dependence     COMPARISON: 3/29/2024     DLP: 45.88 mGy.cm. Automated exposure control was utilized to decrease  patient radiation dose.     FINDINGS:     The central airways are clear. No consolidation or pleural effusion.  Linear atelectasis in the left greater than right lower lungs. Mild  centrilobular emphysema. No suspicious pulmonary nodule.     No enlarged thoracic lymph nodes. Main pulmonary artery is nondilated.  Thoracic aorta is nonaneurysmal. Mild coronary artery calcification. No  pericardial effusion.     No acute chest soft tissue abnormality. Diffuse hepatic steatosis. No  acute upper abdominal finding. Mild multilevel thoracic spine  degenerative change. No acute osseous finding.     IMPRESSION:     No suspicious pulmonary nodule.     Lung-RADS 1: Negative   No nodules and definitely benign nodules.  Continue annual screening with low-dose CT in 12 months.        This report was signed and finalized on 4/25/2025 11:57 AM by Dr. Earl Owens MD.        Assessment:  1. Chronic obstructive pulmonary disease, unspecified COPD type    2. Dependence on nocturnal oxygen therapy    3. Restrictive lung disease    4. DARRIN (obstructive sleep apnea)    5. Nocturnal hypoxemia    6. Former smoker    7. History of COVID-19    8. Obesity (BMI  30-39.9)    9. Non-seasonal allergic rhinitis, unspecified trigger    10. Other hemochromatosis    11. Charcot-Claire-Tooth disease    12. Other specified anxiety disorders        Plan/Recommendations:    I explained the CT scan results to the patient.  He had stable findings with emphysematous changes but no lung nodules.  Further CT scan will be done in a year time  Patient will continue using Breztri and albuterol rescue inhaler for his underlying moderate obstructive airway dysfunction he also has severe restrictive dysfunction noted.  He did not need any medication refills  He has obstructive sleep apnea nocturnal hypoxemia but using only oxygen 2 L at night which should be continued.  He will also continue Astelin nasal spray, fluticasone nasal spray and loratadine for nasal allergy.  He will continue other medicines as before and follow-up with the primary care provider and I advised him return to pulmonary clinic for a follow-up visit in 6 months time or earlier if needed.  He also has hemochromatosis and anxiety disorder.    Follow up:  6 Months    Time Spent:  30 minutes    I appreciate the opportunity of participating in this patient's care. I would like to thank the PCP for the referral.  Please feel free to contact me with any other questions.    Shanice Hebert MD   Pulmonologist/Intensivist     Electronically signed by: Shanice Hebert MD, 5/13/2025 15:45 CDT

## 2025-05-14 RX ORDER — GEMFIBROZIL 600 MG/1
TABLET, FILM COATED ORAL
Qty: 90 TABLET | Refills: 4 | Status: SHIPPED | OUTPATIENT
Start: 2025-05-14

## 2025-06-06 DIAGNOSIS — E83.118 OTHER HEMOCHROMATOSIS: Primary | ICD-10-CM

## 2025-07-07 ENCOUNTER — OFFICE VISIT (OUTPATIENT)
Dept: GASTROENTEROLOGY | Facility: CLINIC | Age: 60
End: 2025-07-07
Payer: MEDICARE

## 2025-07-07 VITALS
WEIGHT: 195 LBS | SYSTOLIC BLOOD PRESSURE: 140 MMHG | HEART RATE: 94 BPM | OXYGEN SATURATION: 97 % | TEMPERATURE: 98.2 F | DIASTOLIC BLOOD PRESSURE: 98 MMHG | BODY MASS INDEX: 31.34 KG/M2 | HEIGHT: 66 IN

## 2025-07-07 DIAGNOSIS — Z83.719 FAMILY HISTORY OF COLONIC POLYPS: ICD-10-CM

## 2025-07-07 DIAGNOSIS — Z86.0101 HISTORY OF ADENOMATOUS POLYP OF COLON: Primary | ICD-10-CM

## 2025-07-07 PROCEDURE — 3080F DIAST BP >= 90 MM HG: CPT | Performed by: NURSE PRACTITIONER

## 2025-07-07 PROCEDURE — 3077F SYST BP >= 140 MM HG: CPT | Performed by: NURSE PRACTITIONER

## 2025-07-07 PROCEDURE — S0260 H&P FOR SURGERY: HCPCS | Performed by: NURSE PRACTITIONER

## 2025-07-07 PROCEDURE — 1159F MED LIST DOCD IN RCRD: CPT | Performed by: NURSE PRACTITIONER

## 2025-07-07 PROCEDURE — 1160F RVW MEDS BY RX/DR IN RCRD: CPT | Performed by: NURSE PRACTITIONER

## 2025-07-07 RX ORDER — SODIUM, POTASSIUM,MAG SULFATES 17.5-3.13G
2 SOLUTION, RECONSTITUTED, ORAL ORAL TAKE AS DIRECTED
Qty: 354 ML | Refills: 0 | Status: SHIPPED | OUTPATIENT
Start: 2025-07-07

## 2025-07-07 NOTE — PROGRESS NOTES
"Chief Complaint   Patient presents with    GI Problem     Colonoscopy recall       PCP: Moisés Yang APRN  REFER: No ref. provider found    Subjective     HPI    Iam Gibbs is a 60 y.o. male who presents to office for preventative maintenance.  There is  a personal history of colon polyps.   He does not have complaints of nausea/vomiting, change in bowels, weight loss, no BRBPR, no melena.  There is not a family history of colon cancer in first degree relative.  There is (mother) a family history of colon polyps in first degree relative.  Iam Gibbs last colonoscopy-2020 .  Bowels do move on regular basis.    COLONOSCOPY (02/17/2020 08:45)   Tissue Pathology Exam (02/17/2020 08:56) -tubular adenoma     UPPER GI ENDOSCOPY (02/25/2020 08:59)     Lab Results - Last 18 Months   Lab Units 03/06/25  1418 08/20/24  0921 05/30/24  0114 01/29/24  1231   GLUCOSE mg/dL 95 112* 112* 87   SODIUM mmol/L 139 141 136 142   POTASSIUM mmol/L 3.8 4.1 4.1 3.8   CREATININE mg/dL 1.08 0.77 1.24 0.99   BUN mg/dL 13 24* 19 23*   BUN / CREAT RATIO  12.0 31.2* 15.3 23.2   ALK PHOS U/L 53 63 69 46   ALT (SGPT) U/L 28 21 31 22   AST (SGOT) U/L 22 19 28 18   BILIRUBIN mg/dL 0.5 0.4 0.3 0.3   ALBUMIN g/dL 4.3 4.4 4.6 4.3       No results for input(s): \"EGFRIFNONA\", \"EGFRIFAFRI\", \"EGFRRESULT\" in the last 40756 hours.     Past Medical History:   Diagnosis Date    Anemia     Arthritis     Back pain     Charcot-Claire-Tooth disease     DX AT 19     Claustrophobia     COPD (chronic obstructive pulmonary disease)     Elevated cholesterol     Gait abnormality     DUE TO CMT     GERD (gastroesophageal reflux disease)     Hyperlipidemia     Hypertension     Irritable bowel     Other diseases of vocal cords     NARROW VOICE BOX     Sleep apnea     DOES NOT USE BIPAP OR CPAP      Past Surgical History:   Procedure Laterality Date    BICEPS TENDONESIS SUBPECTORALIS REPAIR Right 6/28/2019    Procedure: BICEPS TENODESIS - RIGHT;  " Surgeon: Yeison Agudelo MD;  Location: [facility] OR;  Service: Orthopedics    COLONOSCOPY N/A 2/17/2020    Procedure: COLONOSCOPY WITH ANESTHESIA;  Surgeon: Cal Vela DO;  Location: [facility] ENDOSCOPY;  Service: Gastroenterology;  Laterality: N/A;  preop; blood in stool  postop; polyps   PCP anand Jackson     ENDOSCOPY N/A 2/25/2020    Procedure: ESOPHAGOGASTRODUODENOSCOPY WITH ANESTHESIA;  Surgeon: Cal Vela DO;  Location: [facility] ENDOSCOPY;  Service: Gastroenterology;  Laterality: N/A;  preop; blood in stool  postop; normal   PCP Anand Jackson     SHOULDER ARTHROSCOPY W/ ROTATOR CUFF REPAIR Right 6/28/2019    Procedure: RIGHT SHOULDER ARTHROSCOPIC ROTATOR CUFF REPAIR, SUBACROMIAL DECOMPRESSION;  Surgeon: Yeison Agudelo MD;  Location: [facility] OR;  Service: Orthopedics     Outpatient Medications Marked as Taking for the 7/7/25 encounter (Office Visit) with Marcus White APRN   Medication Sig Dispense Refill    acetaminophen (TYLENOL) 500 MG tablet Take 1 tablet by mouth Every 6 (Six) Hours As Needed for Mild Pain.      albuterol (PROVENTIL) (2.5 MG/3ML) 0.083% nebulizer solution Take 2.5 mg by nebulization 3 (Three) Times a Day. 90 each 1    allopurinol (ZYLOPRIM) 100 MG tablet TAKE ONE (1) TABLET BY MOUTH DAILY. 90 tablet 10    Azelastine HCl 137 MCG/SPRAY solution Administer 2 sprays into the nostril(s) as directed by provider 2 (Two) Times a Day. 60 mL 5    Budeson-Glycopyrrol-Formoterol (Breztri Aerosphere) 160-9-4.8 MCG/ACT aerosol inhaler Inhale 2 puffs 2 (Two) Times a Day. 10.7 g 10    buPROPion XL (WELLBUTRIN XL) 150 MG 24 hr tablet TAKE ONE (1) TABLET BY MOUTH DAILY. 90 tablet 10    busPIRone (BUSPAR) 15 MG tablet Take 1 tablet by mouth 2 (Two) Times a Day. 90 tablet 3    fluticasone (FLONASE) 50 MCG/ACT nasal spray Administer 2 sprays into the nostril(s) as directed by provider Daily. 16 g 5    gemfibrozil (LOPID) 600 MG tablet TAKE ONE (1) TABLET BY MOUTH DAILY. NEEDS OFFICE APPT. 90 tablet 4     hydroCHLOROthiazide 25 MG tablet TAKE ONE (1) TABLET BY MOUTH DAILY 90 tablet 10    ibuprofen (ADVIL,MOTRIN) 600 MG tablet Take 1 tablet by mouth Every 6 (Six) Hours As Needed for Mild Pain.      loratadine (CLARITIN) 10 MG tablet Take 1 tablet by mouth Daily. 90 tablet 3    losartan (COZAAR) 25 MG tablet Take 1 tablet by mouth Daily. 90 tablet 4    pantoprazole (PROTONIX) 40 MG EC tablet TAKE ONE (1) TABLET BY MOUTH DAILY 90 tablet 3    tadalafil (Cialis) 20 MG tablet Take 1 tablet by mouth Daily As Needed for Erectile Dysfunction. 30 tablet 1     Allergies   Allergen Reactions    Adhesive Tape Rash     VERY SENSITIVE TO ADHESIVE      Social History     Socioeconomic History    Marital status: Single    Years of education: 10    Highest education level: GED or equivalent   Tobacco Use    Smoking status: Former     Current packs/day: 0.00     Average packs/day: 1 pack/day for 32.7 years (32.7 ttl pk-yrs)     Types: Cigars, Cigarettes     Start date:      Quit date: 10/2015     Years since quittin.7     Passive exposure: Past    Smokeless tobacco: Former     Quit date:    Vaping Use    Vaping status: Never Used   Substance and Sexual Activity    Alcohol use: Yes     Alcohol/week: 3.0 standard drinks of alcohol     Types: 3 Cans of beer per week     Comment: weekly    Drug use: Yes     Types: Marijuana    Sexual activity: Yes     Partners: Female       Review of Systems   Constitutional:  Negative for fever and unexpected weight change.   HENT:  Negative for trouble swallowing.    Respiratory:  Negative for shortness of breath.    Cardiovascular:  Negative for chest pain.   Gastrointestinal:  Negative for abdominal pain and anal bleeding.     Objective   Vitals:    25 1311   BP: 140/98   Pulse: 94   Temp: 98.2 °F (36.8 °C)   SpO2: 97%     Physical Exam  Constitutional:       Appearance: Normal appearance. He is well-developed.   Eyes:      General: No scleral icterus.  Cardiovascular:      Heart  sounds: Normal heart sounds. No murmur heard.  Pulmonary:      Effort: Pulmonary effort is normal.   Abdominal:      General: Bowel sounds are normal. There is no distension.      Palpations: Abdomen is soft.      Tenderness: There is no abdominal tenderness. There is no guarding.   Skin:     General: Skin is warm and dry.      Coloration: Skin is not jaundiced.   Neurological:      Mental Status: He is alert.   Psychiatric:         Behavior: Behavior is cooperative.       Imaging Results (Most Recent)       None          Body mass index is 31.47 kg/m².    Assessment & Plan   Diagnoses and all orders for this visit:    1. History of adenomatous polyp of colon (Primary)  -     Case Request; Standing  -     Implement Anesthesia Orders Day of Procedure; Standing  -     Follow Anesthesia Guidelines / Protocol; Future  -     Obtain Informed Consent; Standing  -     Case Request    2. Family history of colonic polyps    Other orders  -     sodium-potassium-magnesium sulfates (Suprep Bowel Prep Kit) 17.5-3.13-1.6 GM/177ML solution oral solution; Take 2 bottles by mouth Take As Directed. Take 1 bottle night before and 1 bottle morning of procedure.  Time as directed  Dispense: 354 mL; Refill: 0      COLONOSCOPY WITH ANESTHESIA- (examination of colon) (N/A)          Iam Gibbs advised to go ahead and pick prep up from pharmacy if prescription to prevent medication from being placed back on the shelf.       Iam Gibbs has been instructed to take all routine heart and blood pressure medications on the morning of the procedure with a small sip of water, IF THESE MEDICATIONS ARE TAKEN ON A DAILY BASIS IN THE MORNINGS.   Diuretics (furosemide, hydrochlorothiazide, spironolactone) should be held morning of procedure to avoid dehydration and electrolyte imbalance due to bowel preparation.     Iam Gibbs was advised if they take diabetes medication they should hold all oral diabetes medications and  "\"short acting\" insulin day prior to colonoscopy (day of clear liquid diet) to reduce risk of hypoglycemia.    Long acting insulin will need to be adjusted (if taking) to 1/2 PM dose.     No diabetes medication needs to be taken prior to colonoscopy       All risks, benefits, alternatives, and indications of colonoscopy procedure have been discussed with the patient. Risks to include perforation of the colon requiring possible surgery or colostomy, risk of bleeding from biopsies or removal of colon tissue, possibility of missing a colon polyp or cancer, or adverse drug reaction.  Benefits to include the diagnosis and management of disease of the colon and rectum. Alternatives to include barium enema, radiographic evaluation, lab testing or no intervention. He verbalizes understanding and agrees.     I have explained having an adequate and complete prep is associated with success of colonoscopy.   I have explained to Iam Gibbs that not having an adequate bowel prep can lead to decreased rate of  adenoma detection, longer procedure times, and higher chance the physician will not be able to successfully complete full colonoscopy (able to intubate cecum).   I have discussed bowel prep options miralax prep, suprep, and Golytley.  Iam Gibbs has elected to proceed with suprep.   I have also discussed that there are a variety of prep options however prep that is prescribed is influenced on patient health history, how well bowels move on regular basis, and individualized insurance plan.  I have explained that we are not able to know the amount of coverage each individual insurance plan provides for preps.  I asked Iam Gibbs to please call our office if we need to send prescription/provide instructions for another prep due to costs/insurance coverage.        Marcus White, APRN  07/07/25        There are no Patient Instructions on file for this visit.  "

## 2025-07-18 ENCOUNTER — OFFICE VISIT (OUTPATIENT)
Dept: FAMILY MEDICINE CLINIC | Facility: CLINIC | Age: 60
End: 2025-07-18
Payer: MEDICARE

## 2025-07-18 VITALS
HEIGHT: 66 IN | HEART RATE: 77 BPM | DIASTOLIC BLOOD PRESSURE: 100 MMHG | BODY MASS INDEX: 31.15 KG/M2 | WEIGHT: 193.8 LBS | SYSTOLIC BLOOD PRESSURE: 140 MMHG | OXYGEN SATURATION: 96 %

## 2025-07-18 DIAGNOSIS — F33.41 RECURRENT MAJOR DEPRESSIVE DISORDER, IN PARTIAL REMISSION: ICD-10-CM

## 2025-07-18 DIAGNOSIS — F41.9 ANXIETY: ICD-10-CM

## 2025-07-18 DIAGNOSIS — E79.0 HYPERURICEMIA: ICD-10-CM

## 2025-07-18 DIAGNOSIS — E78.1 HYPERTRIGLYCERIDEMIA: ICD-10-CM

## 2025-07-18 DIAGNOSIS — K21.9 GASTROESOPHAGEAL REFLUX DISEASE: ICD-10-CM

## 2025-07-18 DIAGNOSIS — Z12.5 SCREENING FOR PROSTATE CANCER: ICD-10-CM

## 2025-07-18 DIAGNOSIS — I10 ESSENTIAL HYPERTENSION: Primary | ICD-10-CM

## 2025-07-18 RX ORDER — LOSARTAN POTASSIUM 25 MG/1
25 TABLET ORAL DAILY
Qty: 90 TABLET | Refills: 3 | Status: SHIPPED | OUTPATIENT
Start: 2025-07-18

## 2025-07-18 RX ORDER — BUSPIRONE HYDROCHLORIDE 15 MG/1
15 TABLET ORAL 2 TIMES DAILY
Qty: 180 TABLET | Refills: 3 | Status: SHIPPED | OUTPATIENT
Start: 2025-07-18

## 2025-07-18 RX ORDER — ALLOPURINOL 100 MG/1
100 TABLET ORAL DAILY
Qty: 90 TABLET | Refills: 3 | Status: SHIPPED | OUTPATIENT
Start: 2025-07-18

## 2025-07-18 RX ORDER — PANTOPRAZOLE SODIUM 40 MG/1
40 TABLET, DELAYED RELEASE ORAL DAILY
Qty: 90 TABLET | Refills: 3 | Status: SHIPPED | OUTPATIENT
Start: 2025-07-18

## 2025-07-18 RX ORDER — HYDROCHLOROTHIAZIDE 25 MG/1
25 TABLET ORAL DAILY
Qty: 90 TABLET | Refills: 3 | Status: SHIPPED | OUTPATIENT
Start: 2025-07-18

## 2025-07-18 RX ORDER — BUPROPION HYDROCHLORIDE 150 MG/1
150 TABLET ORAL DAILY
Qty: 90 TABLET | Refills: 3 | Status: SHIPPED | OUTPATIENT
Start: 2025-07-18

## 2025-07-18 RX ORDER — AMOXICILLIN 500 MG/1
CAPSULE ORAL
COMMUNITY
Start: 2025-07-18

## 2025-07-18 NOTE — PROGRESS NOTES
Subjective   Chief Complaint:  Medication management    History of Present Illness  The patient is a 60-year-old male who presents for medication management. He has a history of high cholesterol and is on gemfibrozil. He is due for labs and is completely out of all of his medications today. He reports depression and anxiety, which are well-managed with his current medications, including BuSpar 15 mg twice a day and bupropion  mg daily. He also has a history of GERD with no recent flare-ups, although he has run out of his Protonix 40 mg daily. He has high blood pressure and is out of his medication, with elevated blood pressure noted today. He continues to take HCTZ 25 mg daily and losartan 25 mg daily, and reports improved blood pressure readings at home while on these medications. Additionally, he has hyperuricemia and is on Zyloprim 100 mg daily.    Past Medical, Surgical, Social, and Family History:  Allergies   Allergen Reactions    Adhesive Tape Rash     VERY SENSITIVE TO ADHESIVE       Past Medical History:   Diagnosis Date    Anemia     Arthritis     Back pain     Charcot-Claire-Tooth disease     DX AT 19     Claustrophobia     COPD (chronic obstructive pulmonary disease)     Elevated cholesterol     Erectile dysfunction 1-1-15    Guessing    Gait abnormality     DUE TO CMT     GERD (gastroesophageal reflux disease)     History of medical problems 1980    CMT    Hyperlipidemia     Hypertension     Irritable bowel     Low back pain     Other diseases of vocal cords     NARROW VOICE BOX     Sleep apnea     DOES NOT USE BIPAP OR CPAP       Past Surgical History:   Procedure Laterality Date    BICEPS TENDONESIS SUBPECTORALIS REPAIR Right 06/28/2019    Procedure: BICEPS TENODESIS - RIGHT;  Surgeon: Yeison Agudleo MD;  Location: [facility] OR;  Service: Orthopedics    COLONOSCOPY N/A 02/17/2020    Procedure: COLONOSCOPY WITH ANESTHESIA;  Surgeon: Cal Vela DO;  Location: [facility] ENDOSCOPY;  Service:  "Gastroenterology;  Laterality: N/A;  preop; blood in stool  postop; polyps   PCP anand Jackson     ENDOSCOPY N/A 2020    Procedure: ESOPHAGOGASTRODUODENOSCOPY WITH ANESTHESIA;  Surgeon: Cal Vela DO;  Location: [facility] ENDOSCOPY;  Service: Gastroenterology;  Laterality: N/A;  preop; blood in stool  postop; normal   PCP Anand Jackson     SHOULDER ARTHROSCOPY W/ ROTATOR CUFF REPAIR Right 2019    Procedure: RIGHT SHOULDER ARTHROSCOPIC ROTATOR CUFF REPAIR, SUBACROMIAL DECOMPRESSION;  Surgeon: Yeison Agudelo MD;  Location: [facility] OR;  Service: Orthopedics    UPPER GASTROINTESTINAL ENDOSCOPY  11-    Just a guess, it been awhile      Social History     Socioeconomic History    Marital status: Single    Years of education: 10    Highest education level: GED or equivalent   Tobacco Use    Smoking status: Former     Current packs/day: 0.00     Average packs/day: 1 pack/day for 32.7 years (32.7 ttl pk-yrs)     Types: Cigars, Cigarettes     Start date:      Quit date: 10/2015     Years since quittin.8     Passive exposure: Past    Smokeless tobacco: Former     Quit date:    Vaping Use    Vaping status: Never Used   Substance and Sexual Activity    Alcohol use: Yes     Alcohol/week: 3.0 standard drinks of alcohol     Types: 3 Cans of beer per week     Comment: weekly    Drug use: Yes     Types: Marijuana    Sexual activity: Yes     Partners: Female      Family History   Problem Relation Age of Onset    Cancer Mother     Diabetes Mother     Colon polyps Mother     Hyperlipidemia Father     Hypertension Father     Cancer Father     Colon cancer Neg Hx        Objective   Vital Signs  /100   Pulse 77   Ht 167.6 cm (66\")   Wt 87.9 kg (193 lb 12.8 oz)   SpO2 96%   BMI 31.28 kg/m²    Physical Exam  Constitutional:       General: He is not in acute distress.     Appearance: He is obese.   Neck:      Vascular: No carotid bruit.   Cardiovascular:      Rate and Rhythm: Normal rate and regular " rhythm.      Pulses: Normal pulses.           Dorsalis pedis pulses are 2+ on the right side and 2+ on the left side.        Posterior tibial pulses are 2+ on the right side and 2+ on the left side.      Heart sounds: No murmur heard.     No friction rub. No gallop.   Pulmonary:      Effort: Pulmonary effort is normal. No respiratory distress.      Breath sounds: Normal breath sounds. No wheezing or rhonchi.   Musculoskeletal:      Right lower leg: No edema.      Left lower leg: No edema.   Neurological:      Mental Status: He is alert.       Assessment & Plan   Assessment & Plan  1. Hypertriglyceridemia.  He will continue with the Lopid regimen. Lipid profile will be assessed today.    2. Chronic anxiety.  His condition is stable. He will continue with BuSpar 15 mg twice a day. All necessary refills have been provided.    3. Chronic GERD.  His condition is stable. He will continue with Protonix 40 mg daily.    4. Essential hypertension.  His condition is chronic and stable. He will continue with losartan 25 mg daily and HCTZ 25 mg daily. A CBC, CMP, TSH, and lipid profile will be conducted today.    5. Screening for prostate cancer.  A PSA test will be conducted today.    6. Recurrent major depression and partial remission.  His condition is chronic and stable. He will continue with bupropion  mg daily. All necessary refills have been provided.    7. Hyperuricemia.  He will continue with allopurinol 100 mg daily. Uric acid level will be assessed today.    Follow-up:  The patient will Return for 6 month medication management.    Records and Results Reviewed:  I reviewed current medications as given by patient and allergy list.    : Hybrid [employer]- and Dragon Speech Recognition - No recording technology was used in the exam room during encounter.    Electronically signed by RUEL Lyman, 07/18/25, 1:28 PM CDT.

## 2025-07-19 LAB
ALBUMIN SERPL-MCNC: 4.6 G/DL (ref 3.5–5.2)
ALBUMIN/GLOB SERPL: 2 G/DL
ALP SERPL-CCNC: 58 U/L (ref 39–117)
ALT SERPL-CCNC: 23 U/L (ref 1–41)
AST SERPL-CCNC: 21 U/L (ref 1–40)
BASOPHILS # BLD AUTO: 0.06 10*3/MM3 (ref 0–0.2)
BASOPHILS NFR BLD AUTO: 0.7 % (ref 0–1.5)
BILIRUB SERPL-MCNC: 0.5 MG/DL (ref 0–1.2)
BUN SERPL-MCNC: 14 MG/DL (ref 6–20)
BUN/CREAT SERPL: 14 (ref 7–25)
CALCIUM SERPL-MCNC: 9.9 MG/DL (ref 8.6–10.5)
CHLORIDE SERPL-SCNC: 99 MMOL/L (ref 98–107)
CHOLEST SERPL-MCNC: 194 MG/DL (ref 0–200)
CO2 SERPL-SCNC: 32.2 MMOL/L (ref 22–29)
CREAT SERPL-MCNC: 1 MG/DL (ref 0.76–1.27)
EGFRCR SERPLBLD CKD-EPI 2021: 87.2 ML/MIN/1.73
EOSINOPHIL # BLD AUTO: 0.28 10*3/MM3 (ref 0–0.4)
EOSINOPHIL NFR BLD AUTO: 3.3 % (ref 0.3–6.2)
ERYTHROCYTE [DISTWIDTH] IN BLOOD BY AUTOMATED COUNT: 13.3 % (ref 12.3–15.4)
GLOBULIN SER CALC-MCNC: 2.3 GM/DL
GLUCOSE SERPL-MCNC: 83 MG/DL (ref 65–99)
HCT VFR BLD AUTO: 51.5 % (ref 37.5–51)
HDLC SERPL-MCNC: 44 MG/DL (ref 40–60)
HGB BLD-MCNC: 16.7 G/DL (ref 13–17.7)
IMM GRANULOCYTES # BLD AUTO: 0.05 10*3/MM3 (ref 0–0.05)
IMM GRANULOCYTES NFR BLD AUTO: 0.6 % (ref 0–0.5)
LDLC SERPL CALC-MCNC: 109 MG/DL (ref 0–100)
LYMPHOCYTES # BLD AUTO: 1.66 10*3/MM3 (ref 0.7–3.1)
LYMPHOCYTES NFR BLD AUTO: 19.6 % (ref 19.6–45.3)
MCH RBC QN AUTO: 30.9 PG (ref 26.6–33)
MCHC RBC AUTO-ENTMCNC: 32.4 G/DL (ref 31.5–35.7)
MCV RBC AUTO: 95.2 FL (ref 79–97)
MONOCYTES # BLD AUTO: 0.82 10*3/MM3 (ref 0.1–0.9)
MONOCYTES NFR BLD AUTO: 9.7 % (ref 5–12)
NEUTROPHILS # BLD AUTO: 5.6 10*3/MM3 (ref 1.7–7)
NEUTROPHILS NFR BLD AUTO: 66.1 % (ref 42.7–76)
NRBC BLD AUTO-RTO: 0 /100 WBC (ref 0–0.2)
PLATELET # BLD AUTO: 247 10*3/MM3 (ref 140–450)
POTASSIUM SERPL-SCNC: 4.9 MMOL/L (ref 3.5–5.2)
PROT SERPL-MCNC: 6.9 G/DL (ref 6–8.5)
PSA SERPL-MCNC: 0.43 NG/ML (ref 0–4)
RBC # BLD AUTO: 5.41 10*6/MM3 (ref 4.14–5.8)
SODIUM SERPL-SCNC: 142 MMOL/L (ref 136–145)
TRIGL SERPL-MCNC: 237 MG/DL (ref 0–150)
TSH SERPL DL<=0.005 MIU/L-ACNC: 1.58 UIU/ML (ref 0.27–4.2)
URATE SERPL-MCNC: 6.1 MG/DL (ref 3.4–7)
VLDLC SERPL CALC-MCNC: 41 MG/DL (ref 5–40)
WBC # BLD AUTO: 8.47 10*3/MM3 (ref 3.4–10.8)

## 2025-08-25 ENCOUNTER — ANESTHESIA EVENT (OUTPATIENT)
Dept: GASTROENTEROLOGY | Facility: HOSPITAL | Age: 60
End: 2025-08-25
Payer: MEDICARE

## 2025-08-25 ENCOUNTER — HOSPITAL ENCOUNTER (OUTPATIENT)
Facility: HOSPITAL | Age: 60
Setting detail: HOSPITAL OUTPATIENT SURGERY
Discharge: HOME | End: 2025-08-25
Attending: INTERNAL MEDICINE | Admitting: INTERNAL MEDICINE
Payer: MEDICARE

## 2025-08-25 ENCOUNTER — ANESTHESIA (OUTPATIENT)
Dept: GASTROENTEROLOGY | Facility: HOSPITAL | Age: 60
End: 2025-08-25
Payer: MEDICARE

## 2025-08-25 VITALS
SYSTOLIC BLOOD PRESSURE: 138 MMHG | BODY MASS INDEX: 31.02 KG/M2 | WEIGHT: 193 LBS | DIASTOLIC BLOOD PRESSURE: 89 MMHG | HEART RATE: 94 BPM | OXYGEN SATURATION: 98 % | TEMPERATURE: 97.6 F | HEIGHT: 66 IN | RESPIRATION RATE: 20 BRPM

## 2025-08-25 DIAGNOSIS — Z86.0101 HISTORY OF ADENOMATOUS POLYP OF COLON: ICD-10-CM

## 2025-08-25 PROCEDURE — 25010000002 LIDOCAINE PF 2% 2 % SOLUTION: Performed by: NURSE ANESTHETIST, CERTIFIED REGISTERED

## 2025-08-25 PROCEDURE — 25810000003 SODIUM CHLORIDE 0.9 % SOLUTION: Performed by: NURSE ANESTHETIST, CERTIFIED REGISTERED

## 2025-08-25 PROCEDURE — 45385 COLONOSCOPY W/LESION REMOVAL: CPT | Performed by: INTERNAL MEDICINE

## 2025-08-25 PROCEDURE — 88305 TISSUE EXAM BY PATHOLOGIST: CPT | Performed by: INTERNAL MEDICINE

## 2025-08-25 PROCEDURE — 25010000002 PROPOFOL 10 MG/ML EMULSION: Performed by: NURSE ANESTHETIST, CERTIFIED REGISTERED

## 2025-08-25 RX ORDER — LIDOCAINE HYDROCHLORIDE 10 MG/ML
0.5 INJECTION, SOLUTION EPIDURAL; INFILTRATION; INTRACAUDAL; PERINEURAL ONCE AS NEEDED
Status: DISCONTINUED | OUTPATIENT
Start: 2025-08-25 | End: 2025-08-25 | Stop reason: HOSPADM

## 2025-08-25 RX ORDER — SODIUM CHLORIDE 9 MG/ML
500 INJECTION, SOLUTION INTRAVENOUS CONTINUOUS PRN
Status: DISCONTINUED | OUTPATIENT
Start: 2025-08-25 | End: 2025-08-25 | Stop reason: HOSPADM

## 2025-08-25 RX ORDER — PROPOFOL 10 MG/ML
VIAL (ML) INTRAVENOUS AS NEEDED
Status: DISCONTINUED | OUTPATIENT
Start: 2025-08-25 | End: 2025-08-25 | Stop reason: SURG

## 2025-08-25 RX ORDER — LIDOCAINE HYDROCHLORIDE 20 MG/ML
INJECTION, SOLUTION EPIDURAL; INFILTRATION; INTRACAUDAL; PERINEURAL AS NEEDED
Status: DISCONTINUED | OUTPATIENT
Start: 2025-08-25 | End: 2025-08-25 | Stop reason: SURG

## 2025-08-25 RX ORDER — SODIUM CHLORIDE 0.9 % (FLUSH) 0.9 %
10 SYRINGE (ML) INJECTION AS NEEDED
Status: DISCONTINUED | OUTPATIENT
Start: 2025-08-25 | End: 2025-08-25 | Stop reason: HOSPADM

## 2025-08-25 RX ADMIN — PROPOFOL 350 MG: 10 INJECTION, EMULSION INTRAVENOUS at 07:51

## 2025-08-25 RX ADMIN — SODIUM CHLORIDE: 0.9 INJECTION, SOLUTION INTRAVENOUS at 07:48

## 2025-08-25 RX ADMIN — LIDOCAINE HYDROCHLORIDE 50 MG: 20 INJECTION, SOLUTION EPIDURAL; INFILTRATION; INTRACAUDAL; PERINEURAL at 07:52

## 2025-08-26 LAB
LAB AP CASE REPORT: NORMAL
Lab: NORMAL
PATH REPORT.FINAL DX SPEC: NORMAL
PATH REPORT.GROSS SPEC: NORMAL

## (undated) DEVICE — ANTIBACTERIAL UNDYED BRAIDED (POLYGLACTIN 910), SYNTHETIC ABSORBABLE SUTURE: Brand: COATED VICRYL

## (undated) DEVICE — Device

## (undated) DEVICE — CONMED SCOPE SAVER BITE BLOCK, 20X27 MM: Brand: SCOPE SAVER

## (undated) DEVICE — GLV SURG BIOGEL LTX PF 6 1/2

## (undated) DEVICE — THE SINGLE USE ETRAP – POLYP TRAP IS USED FOR SUCTION RETRIEVAL OF ENDOSCOPICALLY REMOVED POLYPS.: Brand: ETRAP

## (undated) DEVICE — ARGYLE YANKAUER BULB TIP WITH VENT: Brand: ARGYLE

## (undated) DEVICE — DEFENDO AIR WATER SUCTION AND BIOPSY VALVE KIT: Brand: DEFENDO AIR/WATER/SUCTION AND BIOPSY VALVE

## (undated) DEVICE — 1010 S-DRAPE TOWEL DRAPE 10/BX: Brand: STERI-DRAPE™

## (undated) DEVICE — GLV SURG TRIUMPH GREEN W/ALOE PF LTX 8 STRL

## (undated) DEVICE — GOWN,PREVENTION PLUS,XLONG/XLARGE,STRL: Brand: MEDLINE

## (undated) DEVICE — YANKAUER,BULB TIP WITH VENT: Brand: ARGYLE

## (undated) DEVICE — SENSR O2 OXIMAX FNGR A/ 18IN NONSTR

## (undated) DEVICE — MASK,OXYGEN,MED CONC,ADLT,7' TUB, UC: Brand: PENDING

## (undated) DEVICE — GLV SURG TRIUMPH GREEN W/ALOE PF LTX 7 STRL

## (undated) DEVICE — PACK,SHOULDER,DRAPE,POUCH: Brand: MEDLINE

## (undated) DEVICE — SUT ETHLN 3/0 FS1 30IN 669H

## (undated) DEVICE — SYS SKIN CLS DERMABOND PRINEO W/22CM MESH TP

## (undated) DEVICE — THE CHANNEL CLEANING BRUSH IS A NYLON FLEXI BRUSH ATTACHED TO A FLEXIBLE PLASTIC SHEATH DESIGNED TO SAFELY REMOVE DEBRIS FROM FLEXIBLE ENDOSCOPES.

## (undated) DEVICE — UTILITY MARKER W/MED LABELS: Brand: MEDLINE

## (undated) DEVICE — Device: Brand: QUATTRO® SUTURE PASSER NEEDLE

## (undated) DEVICE — SUT PDS 1 CTX 36IN VIO PDP371T

## (undated) DEVICE — FRCP BX RADJAW4 NDL 2.8 240 STD OG

## (undated) DEVICE — 3M™ IOBAN™ 2 ANTIMICROBIAL INCISE DRAPE 6650EZ: Brand: IOBAN™ 2

## (undated) DEVICE — PAD,ABDOMINAL,8"X10",ST,LF: Brand: MEDLINE

## (undated) DEVICE — KT SHLDR SUSP

## (undated) DEVICE — CVR DISP HUG U VAC STEEP TREND

## (undated) DEVICE — PAD GRND REM POLYHESIVE A/ DISP

## (undated) DEVICE — Device: Brand: DEFENDO AIR/WATER/SUCTION AND BIOPSY VALVE

## (undated) DEVICE — GLV SURG TRIUMPH MICRO PF LTX 7.5 STRL

## (undated) DEVICE — PROB ABLAT SERFAS ENERGY 90S 3.5MM

## (undated) DEVICE — SUCTION MAT (LOW PROFILE), 50X34: Brand: NEPTUNE

## (undated) DEVICE — [TOMCAT CUTTER, ARTHROSCOPIC SHAVER BLADE,  DO NOT RESTERILIZE,  DO NOT USE IF PACKAGE IS DAMAGED,  KEEP DRY,  KEEP AWAY FROM SUNLIGHT]: Brand: FORMULA

## (undated) DEVICE — TBG SMPL FLTR LINE NASL 02/C02 A/ BX/100

## (undated) DEVICE — TBG ARTHRO FLOWSTEADY/ST DISP

## (undated) DEVICE — SUT PROLN 0 CT 30IN 8434H

## (undated) DEVICE — DRSNG GZ CURAD XEROFORM NONADHS 5X9IN STRL

## (undated) DEVICE — TOWEL,OR,DSP,ST,BLUE,DLX,10/PK,8PK/CS: Brand: MEDLINE

## (undated) DEVICE — PK TURNOVER RM ADV

## (undated) DEVICE — ELECTRD NDL EDGE/INSUL/PFTE.787MM 2.84IN

## (undated) DEVICE — SNAR POLYP CAPTIVATOR RND STFF 2.4 240CM 10MM 1P/U

## (undated) DEVICE — PK SHLDR 30

## (undated) DEVICE — 4-PORT MANIFOLD: Brand: NEPTUNE 2

## (undated) DEVICE — COVER,MAYO STAND,STERILE: Brand: MEDLINE

## (undated) DEVICE — TUBING, SUCTION, 1/4" X 12', STRAIGHT: Brand: MEDLINE

## (undated) DEVICE — FOAM BUMP ROUND LARGE: Brand: MEDLINE INDUSTRIES, INC.

## (undated) DEVICE — SNAR POLYP CAPTIVATOR MICROHEX 13 240CM

## (undated) DEVICE — GUIDE KT JUGGERKNOT SOFTANCHOR 2.9

## (undated) DEVICE — CANNULA THREADED FLEX 8.0 X 72MM: Brand: CLEAR-TRAC

## (undated) DEVICE — CUFF,BP,DISP,1 TUBE,ADULT,HP: Brand: MEDLINE